# Patient Record
Sex: FEMALE | Race: WHITE | NOT HISPANIC OR LATINO | Employment: FULL TIME | URBAN - METROPOLITAN AREA
[De-identification: names, ages, dates, MRNs, and addresses within clinical notes are randomized per-mention and may not be internally consistent; named-entity substitution may affect disease eponyms.]

---

## 2016-02-22 LAB — PAP-ABSTRACT: NORMAL

## 2017-11-10 ENCOUNTER — TRANSFERRED RECORDS (OUTPATIENT)
Dept: HEALTH INFORMATION MANAGEMENT | Facility: CLINIC | Age: 32
End: 2017-11-10

## 2018-06-13 ENCOUNTER — PRENATAL OFFICE VISIT (OUTPATIENT)
Dept: NURSING | Facility: CLINIC | Age: 33
End: 2018-06-13
Payer: COMMERCIAL

## 2018-06-13 VITALS
WEIGHT: 168.6 LBS | SYSTOLIC BLOOD PRESSURE: 103 MMHG | HEIGHT: 65 IN | DIASTOLIC BLOOD PRESSURE: 58 MMHG | HEART RATE: 84 BPM | BODY MASS INDEX: 28.09 KG/M2 | TEMPERATURE: 99.1 F

## 2018-06-13 DIAGNOSIS — Z34.90 SUPERVISION OF NORMAL PREGNANCY: Primary | ICD-10-CM

## 2018-06-13 PROBLEM — Z23 NEED FOR TDAP VACCINATION: Status: ACTIVE | Noted: 2018-06-13

## 2018-06-13 LAB
ABO + RH BLD: NORMAL
ABO + RH BLD: NORMAL
ALBUMIN UR-MCNC: NEGATIVE MG/DL
APPEARANCE UR: CLEAR
B-HCG SERPL-ACNC: ABNORMAL IU/L (ref 0–5)
BETA HCG QUAL IFA URINE: POSITIVE
BILIRUB UR QL STRIP: NEGATIVE
BLD GP AB SCN SERPL QL: NORMAL
BLOOD BANK CMNT PATIENT-IMP: NORMAL
COLOR UR AUTO: YELLOW
ERYTHROCYTE [DISTWIDTH] IN BLOOD BY AUTOMATED COUNT: 13.4 % (ref 10–15)
GLUCOSE UR STRIP-MCNC: NEGATIVE MG/DL
HBV SURFACE AG SERPL QL IA: NONREACTIVE
HCT VFR BLD AUTO: 38.5 % (ref 35–47)
HGB BLD-MCNC: 13.2 G/DL (ref 11.7–15.7)
HGB UR QL STRIP: NEGATIVE
HIV 1+2 AB+HIV1 P24 AG SERPL QL IA: NONREACTIVE
KETONES UR STRIP-MCNC: NEGATIVE MG/DL
LEUKOCYTE ESTERASE UR QL STRIP: NEGATIVE
MCH RBC QN AUTO: 31 PG (ref 26.5–33)
MCHC RBC AUTO-ENTMCNC: 34.3 G/DL (ref 31.5–36.5)
MCV RBC AUTO: 90 FL (ref 78–100)
NITRATE UR QL: NEGATIVE
PH UR STRIP: 5 PH (ref 5–7)
PLATELET # BLD AUTO: 246 10E9/L (ref 150–450)
RBC # BLD AUTO: 4.26 10E12/L (ref 3.8–5.2)
SOURCE: NORMAL
SP GR UR STRIP: >1.03 (ref 1–1.03)
SPECIMEN EXP DATE BLD: NORMAL
TSH SERPL DL<=0.005 MIU/L-ACNC: 1.88 MU/L (ref 0.4–4)
UROBILINOGEN UR STRIP-ACNC: 0.2 EU/DL (ref 0.2–1)
WBC # BLD AUTO: 7.1 10E9/L (ref 4–11)

## 2018-06-13 PROCEDURE — 86850 RBC ANTIBODY SCREEN: CPT | Performed by: OBSTETRICS & GYNECOLOGY

## 2018-06-13 PROCEDURE — 84443 ASSAY THYROID STIM HORMONE: CPT | Performed by: OBSTETRICS & GYNECOLOGY

## 2018-06-13 PROCEDURE — 81003 URINALYSIS AUTO W/O SCOPE: CPT | Performed by: OBSTETRICS & GYNECOLOGY

## 2018-06-13 PROCEDURE — 87340 HEPATITIS B SURFACE AG IA: CPT | Performed by: OBSTETRICS & GYNECOLOGY

## 2018-06-13 PROCEDURE — 86762 RUBELLA ANTIBODY: CPT | Performed by: OBSTETRICS & GYNECOLOGY

## 2018-06-13 PROCEDURE — 86900 BLOOD TYPING SEROLOGIC ABO: CPT | Performed by: OBSTETRICS & GYNECOLOGY

## 2018-06-13 PROCEDURE — 86780 TREPONEMA PALLIDUM: CPT | Performed by: OBSTETRICS & GYNECOLOGY

## 2018-06-13 PROCEDURE — 85027 COMPLETE CBC AUTOMATED: CPT | Performed by: OBSTETRICS & GYNECOLOGY

## 2018-06-13 PROCEDURE — 84703 CHORIONIC GONADOTROPIN ASSAY: CPT | Performed by: OBSTETRICS & GYNECOLOGY

## 2018-06-13 PROCEDURE — 86901 BLOOD TYPING SEROLOGIC RH(D): CPT | Performed by: OBSTETRICS & GYNECOLOGY

## 2018-06-13 PROCEDURE — 99207 ZZC NO CHARGE NURSE ONLY: CPT

## 2018-06-13 PROCEDURE — 87086 URINE CULTURE/COLONY COUNT: CPT | Performed by: OBSTETRICS & GYNECOLOGY

## 2018-06-13 PROCEDURE — 36415 COLL VENOUS BLD VENIPUNCTURE: CPT | Performed by: OBSTETRICS & GYNECOLOGY

## 2018-06-13 PROCEDURE — 84702 CHORIONIC GONADOTROPIN TEST: CPT | Performed by: OBSTETRICS & GYNECOLOGY

## 2018-06-13 PROCEDURE — 87389 HIV-1 AG W/HIV-1&-2 AB AG IA: CPT | Performed by: OBSTETRICS & GYNECOLOGY

## 2018-06-13 NOTE — MR AVS SNAPSHOT
After Visit Summary   6/13/2018    Awilda Campos    MRN: [id number]           Patient Information     Date Of Birth          1985        Visit Information        Provider Department      6/13/2018 10:00 AM RD OB NURSE EDUCATION [facility]        Today's Diagnoses     Supervision of normal pregnancy    -  1       Follow-ups after your visit        Your next 10 appointments already scheduled     Jun 22, 2018 10:20 AM CDT   US OB < 14 WEEKS SINGLE with RDUS1   [facility] ([facility])    [address] [place] [id number] [phone]           Please bring a list of your medicines (including vitamins, minerals and over-the-counter drugs). Also, tell your doctor about any allergies you may have. Wear comfortable clothes and leave your valuables at home.  If you re less than 20 weeks drink four 8-ounce glasses of fluid an hour before your exam. If you need to empty your bladder before your exam, try to release only a little urine. Then, drink another glass of fluid.  You may have up to two family members in the exam room. If you bring a small child, an adult must be there to care for him or her.  Please call the Imaging Department at your exam site with any questions.            Jun 22, 2018 11:00 AM CDT   SHORT with SHILO Parmar CNM   [facility] ([facility])    [address] [place] [id number] [phone]            Jul 06, 2018  4:00 PM CDT   New Prenatal with Kim Giraldo MD   [facility] ([facility])    [address] [place] [id number] [phone]              Future tests that were ordered for you today     Open Future Orders        Priority Expected Expires Ordered    US OB < 14 Weeks Single Routine  6/13/2019 6/13/2018            Who to contact     If you have questions or need follow  "up information about today's clinic visit or your schedule please contact [facility] directly at [phone].  Normal or non-critical lab and imaging results will be communicated to you by MyChart, letter or phone within 4 business days after the clinic has received the results. If you do not hear from us within 7 days, please contact the clinic through [employer]ree[employer] or phone. If you have a critical or abnormal lab result, we will notify you by phone as soon as possible.  Submit refill requests through [employer] or call your pharmacy and they will forward the refill request to us. Please allow 3 business days for your refill to be completed.          Additional Information About Your Visit        Babytreehart Information     [employer] lets you send messages to your doctor, view your test results, renew your prescriptions, schedule appointments and more. To sign up, go to [email or web address] . Click on \"Log in\" on the left side of the screen, which will take you to the Welcome page. Then click on \"Sign up Now\" on the right side of the page.     You will be asked to enter the access code listed below, as well as some personal information. Please follow the directions to create your username and password.     Your access code is: [email or web address]  Expires: 2018  9:28 AM     Your access code will  in 90 days. If you need help or a new code, please call your [place] clinic or [phone].        Care EveryWhere ID     This is your Care EveryWhere ID. This could be used by other organizations to access your [facility] medical records  [id number]        Your Vitals Were     Pulse Temperature Height Last Period BMI (Body Mass Index)       84 99.1  F (37.3  C) 5' 5\" (1.651 m) 04/10/2018 28.06 kg/m2        Blood Pressure from Last 3 Encounters:   18 103/58    Weight from Last 3 Encounters:   18 168 lb 9.6 oz (76.5 kg)              We Performed the Following     ABO/RH Type and Screen     Beta HCG " Qual, Urine - [facility] and [facility] ([id number])     CBC with Platelets     HCG Quantitative Pregnancy, Blood ([id number])     Hepatitis B surface antigen     HIV Antigen Antibody Combo     Rubella Antibody IgG Quantitative     Treponema Abs w Reflex to RPR and Titer     TSH with free T4 reflex     UA without Microscopic     Urine Culture Aerobic Bacterial        Primary Care Provider    None Specified       No primary provider on file.        Equal Access to Services     [facility]: Hadii aad ku hadasho Soomaali, waaxda luqadaha, qaybta kaalmada adejameyada, nathaniel cleary hayjulieth adejame rodriguezmannybhavik martinez . So [facility] [phone].    ATENCIÓN: Si habla español, tiene a dobson disposición servicios gratuitos de asistencia lingüística. Llame al [phone].    We comply with applicable federal civil rights laws and [place] laws. We do not discriminate on the basis of race, color, national origin, age, disability, sex, sexual orientation, or gender identity.            Thank you!     Thank you for choosing [facility]  for your care. Our goal is always to provide you with excellent care. Hearing back from our patients is one way we can continue to improve our services. Please take a few minutes to complete the written survey that you may receive in the mail after your visit with us. Thank you!             Your Updated Medication List - Protect others around you: Learn how to safely use, store and throw away your medicines at www.disposemymeds.org.          This list is accurate as of 6/13/18 10:39 AM.  Always use your most recent med list.                   Brand Name Dispense Instructions for use Diagnosis    * LEVOTHYROXINE SODIUM PO      Take 88 mcg by mouth        * LEVOTHYROXINE SODIUM PO      Take 5 mcg by mouth        PRENATAL + DHA PO           * Notice:  This list has 2 medication(s) that are the same as other medications prescribed for you. Read the directions carefully, and ask your doctor or other care  provider to review them with you.

## 2018-06-13 NOTE — PROGRESS NOTES
Patient presents for new ob teaching and labs, third pregnancy. Recent SAB 4/10/18 ( 5 weeks), negative pregnancy test 3 weeks after SAB. Patient did not have a period in 5/2018, positive pregnancy test. 5/30/18. Patient recently moved here from [place] with her  and toddler. Ultrasound scheduled for 6/22/18 with CNM appointment after to review results. Declined first trimester screening. Handouts reviewed and given. Recommended B6 and Unisom for nausea. Has NOB appointment with Dr Giraldo 7/06/18. Patient was diagnosis with Hashimoto's disease during postpartum. TSH drawn today with NOB labs, plus quant.     Caffeine intake/servings daily - ; 2 tea  Calcium intake/servings daily - 3  Exercise 7 times weekly - describe ; walks, elliptical, active with job(Horticulturist), precautions given   Sunscreen used - Yes  Seatbelts used - Yes  Guns stored in the home - No  Self Breast Exam - Yes  Pap test up to date -  Yes  Eye exam up to date -  Yes  Dental exam up to date -  Yes  DEXA scan up to date -  No  Flex Sig/Colonoscopy up to date -  No  Mammography up to date -  No  Immunizations reviewed and up to date - Yes  Abuse: Current or Past (Physical, Sexual or Emotional) - No  Do you feel safe in your environment - Yes  Do you cope well with stress - Yes  Do you suffer from insomnia - No    Prenatal OB Questionnaire  Past Medical History  Diabetes   No  Hypertension   No  Heart Disease, mitral valve prolapse, or rheumatic fever?   No  An autoimmune disorder such as Lupus or Rheumatoid Arthritis?   No  Kidney Disease or Urinary Tract Infection?   No  Epilepsy, seizures or spells?   No  Migraine headaches?   No  A stroke or loss of function or sensation?   No  Any other neurological problems?   No  Have you ever been treated for depression?  No  Are you having problems with crying spells or loss of self-esteem?   No  Have you ever required psychiatric care?   No  Have you ever hepatitis, liver disease or jaundice?    No  Have you ever been treated for blood clots in your veins, deep venous thrombosis, inflammation in the veins, thrombosis, phlebitis, pulmonary embolism or varicosities?   No  Have you had excessive bleeding after surgery or dental work?   No  Do you bleed more than other women after a cut or scratch?   No  Do you have a history of anemia?   No  Have you ever been treated for thyroid problems or taken thyroid medication?  Takes Levothyroxine   Do you have any other endocrine problems?  No  Have you ever been in a major accident or suffered serious trauma?   No  Within the last year, has anyone hit slapped, kicked or otherwise hurt you?  No  In the last year, has anyone forced you to have sex when you didn't want to?  No  Have you ever had a blood transfusion?   No  Would you refuse a blood transfusion if a doctor judged it to be medically necessary?   No  If you answered yes, would you rather die than have a blood transfusion?   No  If you answered yes, is this for Druze reasons?   No  Does anyone in your home smoke?   No  Do you use tobacco products?  No  Do you drink beer, wine, hard liquor?  No  Do you use any of the following: marijuana, speed, cocaine, heroine, hallucinogens, or other drugs?  No  Is your blood type Rh negative?   No  Have you ever had abnormal antibodies in your blood?   No  Have you ever had asthma?   Yes  Have you ever had tuberculosis?   No  Do you have any allergies to drugs or over-the-counter medications?   No    Allergies as of 6/13/2018:    Allergies as of 06/13/2018     (No Known Allergies)       Do you have any breast problems?   No  Have you ever ?   No  Have you had any gynecological surgical procedures such as cervical conization, a LEEP procedure, laser treatment, cryosurgery of the cervix, or a dilation and curettage, etc?  No  Have you had any other surgical procedures?  No  Have you been hospitalized for a nonsurgical reason excluding normal delivery?   No  Have  you ever had any anesthetic complications?   No  Have you ever had an abnormal pap smear?   No  Do you have a history of abnormalities of the uterus?   No  Did it take you more than one year to become pregnant?   No  Have you ever been evaluated or treated for infertility?   No  Is there a history of medical problems in your family, which you feel might adversely affect your health or pregnancy?   No  Do you have any other problems we have not asked you about which you feel may be important to this pregnancy?  No    Symptoms since Last Menstrual Period  Do you have any of the following:    *abdominal pain  No  *blood in stool or urine  No  *chest pain  No  *shortness of breath  No  *coughing or vomiting up blood No  *heart racing or skipping beats  No  *nausea and vomiting  Yes  *pain with urination  No  *vaginal discharge or bleeding  No  Current medications are:  Current Outpatient Prescriptions   Medication Sig Dispense Refill     LEVOTHYROXINE SODIUM PO Take 88 mcg by mouth       LEVOTHYROXINE SODIUM PO Take 5 mcg by mouth       Prenatal-FeFum-FA-DHA w/o A (PRENATAL + DHA PO)          Genetic Screening  At the time of birth, will you be 35 years old or older?  No  Has the patient, baby s father, or anyone in either family had:  Thalassemia (Italian, Greek, Mediterranean, or  background only) and an MCV result less than 80?  No  Neural tube defect such as meningomyelocele, spina bifida or anencephaly?  No  Congenital heart defect?  No  Down s syndrome?  No  Marck-Sach s disease (Yarsanism, Cajun, Luxembourgish-Sherrill)?  No  Sickle cell disease or trait (Grisel)?  No  Hemophilia or other inherited problems of blood coagulation? No  Muscular dystrophy?  No  Cystic Fibrosis?  No  Hal s chorea?  No  Mental retardation/autism? No   If yes, was the person tested for fragile X?  No  Any other inherited genetic or chromosomal disorder?  No  Maternal metabolic disorder (e.g. insulin-dependent diabetes, PKU)? No  A child  with birth defects not listed above?  No  Recurrent pregnancy loss or a stillbirth?  No  Has the patient had any medications/street drugs/alcohol since her last menstrual period? No  Does the patient or baby s father have any other genetic risks?  No  Infection History  Do you object to being tested for Hepatitis B? No  Do you object to being tested for HIV? No  Do you feel that you are at high risk for coming in contact with the AIDS virus?  No  Have you ever been treated for tuberculosis?  No  Have you ever received the BCG vaccine for tuberculosis?  No  Have you ever had a positive skin test for tuberculosis? No  Do you live with someone who has tuberculosis?  No  Have you ever been exposed to tuberculosis?  No  Do you have genital herpes?  No  Does your partner have genital herpes?  No  Have you had a rash or viral illness since your last period?  No  Have you ever had Gonorrhea, Chlamydia, Syphilis, venereal warts, trichomoniasis, pelvic inflammatory disease or any other sexually transmitted disease?  No  Do you know if you are a genital group B streptococcus carrier? No  You have not had chicken pox/varicella  Yes  Have you been vaccinated against chicken pox?  No  Have you had any other infectious disease? No        Early ultrasound screening tool:    Does patient have irregular periods?  No  Did patient use hormonal birth control in the three months prior to positive urine pregnancy test? No  Is the patient breastfeeding?  No  Is the patient 10 weeks or greater at time of education visit?  No

## 2018-06-14 LAB
BACTERIA SPEC CULT: NORMAL
RUBV IGG SERPL IA-ACNC: 17 IU/ML
SPECIMEN SOURCE: NORMAL
T PALLIDUM AB SER QL: NONREACTIVE

## 2018-06-22 ENCOUNTER — OFFICE VISIT (OUTPATIENT)
Dept: MIDWIFE SERVICES | Facility: CLINIC | Age: 33
End: 2018-06-22
Payer: COMMERCIAL

## 2018-06-22 ENCOUNTER — RADIANT APPOINTMENT (OUTPATIENT)
Dept: ULTRASOUND IMAGING | Facility: CLINIC | Age: 33
End: 2018-06-22
Attending: OBSTETRICS & GYNECOLOGY
Payer: COMMERCIAL

## 2018-06-22 VITALS
BODY MASS INDEX: 27.96 KG/M2 | OXYGEN SATURATION: 96 % | SYSTOLIC BLOOD PRESSURE: 115 MMHG | DIASTOLIC BLOOD PRESSURE: 70 MMHG | HEART RATE: 91 BPM | WEIGHT: 168 LBS

## 2018-06-22 DIAGNOSIS — Z34.90 SUPERVISION OF NORMAL PREGNANCY: ICD-10-CM

## 2018-06-22 DIAGNOSIS — Z34.81 ENCOUNTER FOR SUPERVISION OF OTHER NORMAL PREGNANCY IN FIRST TRIMESTER: Primary | ICD-10-CM

## 2018-06-22 PROBLEM — Z34.80 ENCOUNTER FOR SUPERVISION OF OTHER NORMAL PREGNANCY, UNSPECIFIED TRIMESTER: Status: ACTIVE | Noted: 2018-06-22

## 2018-06-22 PROCEDURE — 99202 OFFICE O/P NEW SF 15 MIN: CPT | Performed by: ADVANCED PRACTICE MIDWIFE

## 2018-06-22 PROCEDURE — 76815 OB US LIMITED FETUS(S): CPT | Performed by: OBSTETRICS & GYNECOLOGY

## 2018-06-22 NOTE — MR AVS SNAPSHOT
"              After Visit Summary   6/22/2018    Awilda Campos    MRN: [id number]           Patient Information     Date Of Birth          1985        Visit Information        Provider Department      6/22/2018 11:00 AM Shana Noble APRN CNM [facility]        Today's Diagnoses     Encounter for supervision of other normal pregnancy in first trimester    -  1       Follow-ups after your visit        Your next 10 appointments already scheduled     Jul 06, 2018  4:00 PM CDT   New Prenatal with Kim Giraldo MD   [facility] ([facility]    [address] [place] [id number] [phone]              Who to contact     If you have questions or need follow up information about today's clinic visit or your schedule please contact [facility] directly at [phone].  Normal or non-critical lab and imaging results will be communicated to you by MyChart, letter or phone within 4 business days after the clinic has received the results. If you do not hear from us within 7 days, please contact the clinic through MyChart or phone. If you have a critical or abnormal lab result, we will notify you by phone as soon as possible.  Submit refill requests through [employer] or call your pharmacy and they will forward the refill request to us. Please allow 3 business days for your refill to be completed.          Additional Information About Your Visit        MyChart Information     [employer] lets you send messages to your doctor, view your test results, renew your prescriptions, schedule appointments and more. To sign up, go to [email or web address] . Click on \"Log in\" on the left side of the screen, which will take you to the Welcome page. Then click on \"Sign up Now\" on the right side of the page.     You will be asked to enter the access code listed below, as well as some personal information. Please follow the directions to " create your username and password.     Your access code is: [email or web address]  Expires: 2018  9:28 AM     Your access code will  in 90 days. If you need help or a new code, please call your [place] clinic or [phone].        Care EveryWhere ID     This is your Care EveryWhere ID. This could be used by other organizations to access your [place] medical records  [id number]        Your Vitals Were     Pulse Last Period Pulse Oximetry BMI (Body Mass Index)          91 04/10/2018 96% 27.96 kg/m2         Blood Pressure from Last 3 Encounters:   18 115/70   18 103/58    Weight from Last 3 Encounters:   18 168 lb (76.2 kg)   18 168 lb 9.6 oz (76.5 kg)              Today, you had the following     No orders found for display       Primary Care Provider Fax #    Physician No Ref-Primary [phone]       No address on file        Equal Access to Services     [facility] : Hadii aad ku hadasho Soomaali, waaxda luqadaha, qaybta kaalmada adeegyada, waxay owenin hayjulieth martinez . So [facility] [phone].    ATENCIÓN: Si habla español, tiene a dobson disposición servicios gratuitos de asistencia lingüística. Llame al [phone].    We comply with applicable federal civil rights laws and [place] laws. We do not discriminate on the basis of race, color, national origin, age, disability, sex, sexual orientation, or gender identity.            Thank you!     Thank you for choosing [facility]  for your care. Our goal is always to provide you with excellent care. Hearing back from our patients is one way we can continue to improve our services. Please take a few minutes to complete the written survey that you may receive in the mail after your visit with us. Thank you!             Your Updated Medication List - Protect others around you: Learn how to safely use, store and throw away your medicines at www.disposemymeds.org.          This list is accurate as of 18  1:23  PM.  Always use your most recent med list.                   Brand Name Dispense Instructions for use Diagnosis    * LEVOTHYROXINE SODIUM PO      Take 88 mcg by mouth        * LEVOTHYROXINE SODIUM PO      Take 5 mcg by mouth        PRENATAL + DHA PO           * Notice:  This list has 2 medication(s) that are the same as other medications prescribed for you. Read the directions carefully, and ask your doctor or other care provider to review them with you.

## 2018-06-22 NOTE — PROGRESS NOTES
Patient is in clinic today for confirmation US. She did not have a period after her miscarriage so there is no LMP. Us today shows single IUP at 10w3d with LEONARDO 1/12/19. Patient has new OB appointment with Dr. Giraldo on 7/6/18. She reports feeling nauseous, but is not vomiting. She does not tolerate B6 tablets, recommendations given for Unisom.  Zhane Zamora  Student Nurse Midwife

## 2018-06-22 NOTE — PROGRESS NOTES
Awilda Campos presents to the clinic for an early ultrasound to date her pregnancy r/t conceived shortly after SAB without period in between. She is feeling well. Has nausea and fatigue but it is slowly improving. Denies vomiting. Happy with good ultrasound results today. Has first OB appointment scheduled in early July with Dr Giraldo.     O: /70  Pulse 91  Wt 168 lb (76.2 kg)  LMP 04/10/2018  SpO2 96%  BMI 27.96 kg/m2  Mentation: normal and bright.    No exam today    A: Graham IUP at 10wks 6d with correlated EDC of 1/12/18    Plan: plan to proceed with new OB appointment with Dr Giraldo. Call clinic with any concerns.    Shana Noble CNM

## 2018-07-06 ENCOUNTER — PRENATAL OFFICE VISIT (OUTPATIENT)
Dept: OBGYN | Facility: CLINIC | Age: 33
End: 2018-07-06
Payer: COMMERCIAL

## 2018-07-06 VITALS
TEMPERATURE: 98.8 F | HEIGHT: 65 IN | HEART RATE: 83 BPM | BODY MASS INDEX: 28.32 KG/M2 | OXYGEN SATURATION: 99 % | WEIGHT: 170 LBS | DIASTOLIC BLOOD PRESSURE: 69 MMHG | SYSTOLIC BLOOD PRESSURE: 108 MMHG

## 2018-07-06 DIAGNOSIS — Z34.80 ENCOUNTER FOR SUPERVISION OF OTHER NORMAL PREGNANCY, UNSPECIFIED TRIMESTER: Primary | ICD-10-CM

## 2018-07-06 DIAGNOSIS — J45.20 MILD INTERMITTENT ASTHMA WITHOUT COMPLICATION: ICD-10-CM

## 2018-07-06 PROCEDURE — 99207 ZZC FIRST OB VISIT: CPT | Performed by: OBSTETRICS & GYNECOLOGY

## 2018-07-06 RX ORDER — ALBUTEROL SULFATE 90 UG/1
2 AEROSOL, METERED RESPIRATORY (INHALATION) EVERY 6 HOURS PRN
Qty: 1 INHALER | Refills: 1 | Status: SHIPPED | OUTPATIENT
Start: 2018-07-06 | End: 2019-11-12

## 2018-07-06 NOTE — PROGRESS NOTES
CC: New Ob visit  HPI: Awilda Campos is a 33 year old  here for new Ob visit.  Patient's last menstrual period was 04/10/2018..  She is 12w6d by known LMP and c/w 10wk us, giving her an EDC of 19.  The pregnancy was planned and she and her  are feeling excited.    This far the pregnancy has been uneventful other than mild nausea.  Her previous pregnancy was uncomplicated, she had a VE of 9lb baby.  Some persistent pain from the laceration, 2nd degree from her description.    Please abstract the following data from this visit with this patient into the appropriate field in Epic:    Pap smear done on this date: 16 (approximately), by this group: [facility] [place], results were nil.       Past Medical History:   Diagnosis Date     Hashimoto's disease        Past Surgical History:   Procedure Laterality Date     NO HISTORY OF SURGERY       Obstetric History       T1      L1     SAB1   TAB0   Ectopic0   Multiple0   Live Births1       # Outcome Date GA Lbr Ren/2nd Weight Sex Delivery Anes PTL Lv   3 Current            2 SAB 04/10/18 5w0d          1 Term 12/06/15 41w0d  9 lb (4.082 kg) M  EPI N CHEPE              Current Outpatient Prescriptions:      albuterol (PROAIR HFA/PROVENTIL HFA/VENTOLIN HFA) 108 (90 Base) MCG/ACT Inhaler, Inhale 2 puffs into the lungs every 6 hours as needed for shortness of breath / dyspnea or wheezing, Disp: 1 Inhaler, Rfl: 1     ALBUTEROL IN, , Disp: , Rfl:      Cholecalciferol (VITAMIN D-3 PO), , Disp: , Rfl:      LEVOTHYROXINE SODIUM PO, Take 88 mcg by mouth, Disp: , Rfl:      LIOTHYRONINE SODIUM PO, Take 5 mcg by mouth, Disp: , Rfl:      Prenatal-FeFum-FA-DHA w/o A (PRENATAL + DHA PO), , Disp: , Rfl:      [DISCONTINUED] LEVOTHYROXINE SODIUM PO, Take 5 mcg by mouth, Disp: , Rfl:     No Known Allergies    Family History   Problem Relation Age of Onset     Breast Cancer Mother      Hyperlipidemia Mother      Asthma Brother      Arthritis Paternal  "Grandmother        Past medical, social, surgical and family history were reviewed and updated in EPIC.    ROS: No TIA's or unusual headaches, no dysphagia.  No prolonged cough. No dyspnea or chest pain on exertion.  No abdominal pain, change in bowel habits, black or bloody stools.  No urinary tract symptoms.  No new or unusual musculoskeletal symptoms.  Normal menses, no abnormal vaginal bleeding, discharge or unexpected pelvic pain. No new breast lumps, breast pain or nipple discharge.    PE: /69 (BP Location: Left arm, Patient Position: Sitting, Cuff Size: Adult Regular)  Pulse 83  Temp 98.8  F (37.1  C) (Oral)  Ht 5' 5\" (1.651 m)  Wt 170 lb (77.1 kg)  LMP 04/10/2018  SpO2 99%  BMI 28.29 kg/m2    Gen: Healthy appearing female in no acute distress  Heart: RRR  Lungs: CTAB  Abd: +BS, SNT  Ex: No C/C/E, no suspicious rashes or lesions    Pelvic: Normal vulva and vagina with scant white d/c.  Cervix without lesions, no CMT.  Uterus approximately 12 week size, mobile, NT.  Adnexa NT, no masses.    A/P:  1) IUP at 12w6d         PNL wnl, pap UTD        Reviewed anticipated course of prenatal care        Reviewed recommendations for weight gain, activity and diet        We discussed options for genetic screening and diagnosis including CVS/amnio, first trimester screen and quad screen.  We discussed a fetal survey will be performed around 18-20 weeks. They are considering the quad screen next visit. Survey ordered        Discussed MD call schedule as well as role of residents and med students both in clinic and hospital.  She is ok with resident care              RTC 4 weeks    Kim Giraldo MD                 "

## 2018-07-06 NOTE — Clinical Note
Mammogram done on this date: 11/10/2017 (approximately), by this group: [facility], results were normal.  Pap smear done on this date: 2/22/2016 (approximately), by this group: [facility], results were NIL.

## 2018-07-06 NOTE — Clinical Note
Please abstract the following data from this visit with this patient into the appropriate field in Epic:  Pap smear done on this date: 2/22/16 (approximately), by this group: [facility] in [place], results were nil.

## 2018-07-06 NOTE — PROGRESS NOTES
"Chief Complaint   Patient presents with     Prenatal Care     12+6.       Initial /69 (BP Location: Left arm, Patient Position: Sitting, Cuff Size: Adult Regular)  Pulse 83  Temp 98.8  F (37.1  C) (Oral)  Ht 5' 5\" (1.651 m)  Wt 170 lb (77.1 kg)  LMP 04/10/2018  SpO2 99%  BMI 28.29 kg/m2 Estimated body mass index is 28.29 kg/(m^2) as calculated from the following:    Height as of this encounter: 5' 5\" (1.651 m).    Weight as of this encounter: 170 lb (77.1 kg).  BP completed using cuff size: regular        The following HM Due: NONE      The following patient reported/Care Every where data was sent to:  P ABSTRACT QUALITY INITIATIVES [16286]  Mammogram done on this date: 11/10/2017 (approximately), by this group: [facility], results were normal.   Pap smear done on this date: 2016 (approximately), by this group: [facility], results were NIL.       n/a and patient has appointment for today                         "

## 2018-07-06 NOTE — MR AVS SNAPSHOT
After Visit Summary   7/6/2018    Awilda Campos    MRN: [id number]           Patient Information     Date Of Birth          1985        Visit Information        Provider Department      7/6/2018 4:00 PM Kim Giraldo MD [facility]        Today's Diagnoses     Encounter for supervision of other normal pregnancy, unspecified trimester    -  1    Mild intermittent asthma without complication           Follow-ups after your visit        Your next 10 appointments already scheduled     Jul 30, 2018  4:30 PM CDT   ESTABLISHED PRENATAL with Casandra Sanchez MD   [facility] ([facility])    [address] [place] [id number] [phone]            Aug 22, 2018  4:00 PM CDT   US OB > 14 WEEKS COMPLETE SINGLE with RDUS1   [facility] ([facility])    [address] [place] [id number] [phone]           Please bring a list of your medicines (including vitamins, minerals and over-the-counter drugs). Also, tell your doctor about any allergies you may have. Wear comfortable clothes and leave your valuables at home.  If you re less than 20 weeks drink four 8-ounce glasses of fluid an hour before your exam. If you need to empty your bladder before your exam, try to release only a little urine. Then, drink another glass of fluid.  You may have up to two family members in the exam room. If you bring a small child, an adult must be there to care for him or her.  Please call the Imaging Department at your exam site with any questions.            Aug 22, 2018  4:45 PM CDT   ESTABLISHED PRENATAL with Kim Giraldo MD   [facility] ([facility])    [address] [place] [id number] [phone]              Future tests that were ordered for you today     Open Future Orders        Priority Expected Expires Ordered    US OB > 14  "Weeks Complete Single Routine  2019            Who to contact     If you have questions or need follow up information about today's clinic visit or your schedule please contact [facility] directly at [phone].  Normal or non-critical lab and imaging results will be communicated to you by Sqordhart, letter or phone within 4 business days after the clinic has received the results. If you do not hear from us within 7 days, please contact the clinic through Sqordhart or phone. If you have a critical or abnormal lab result, we will notify you by phone as soon as possible.  Submit refill requests through [employer] or call your pharmacy and they will forward the refill request to us. Please allow 3 business days for your refill to be completed.          Additional Information About Your Visit        [employer] Information     [employer] lets you send messages to your doctor, view your test results, renew your prescriptions, schedule appointments and more. To sign up, go to [email or web address] . Click on \"Log in\" on the left side of the screen, which will take you to the Welcome page. Then click on \"Sign up Now\" on the right side of the page.     You will be asked to enter the access code listed below, as well as some personal information. Please follow the directions to create your username and password.     Your access code is: [email or web address]  Expires: 2018  9:28 AM     Your access code will  in 90 days. If you need help or a new code, please call your [facility] or [phone].        Care EveryWhere ID     This is your Care EveryWhere ID. This could be used by other organizations to access your [place] medical records  [id number]        Your Vitals Were     Pulse Temperature Height Last Period Pulse Oximetry BMI (Body Mass Index)    83 98.8  F (37.1  C) (Oral) 5' 5\" (1.651 m) 04/10/2018 99% 28.29 kg/m2       Blood Pressure from Last 3 Encounters:   18 108/69   18 " 115/70   06/13/18 103/58    Weight from Last 3 Encounters:   07/06/18 170 lb (77.1 kg)   06/22/18 168 lb (76.2 kg)   06/13/18 168 lb 9.6 oz (76.5 kg)                 Today's Medication Changes          These changes are accurate as of 7/6/18  4:56 PM.  If you have any questions, ask your nurse or doctor.               Start taking these medicines.        Dose/Directions    albuterol 108 (90 Base) MCG/ACT Inhaler   Commonly known as:  PROAIR HFA/PROVENTIL HFA/VENTOLIN HFA   Used for:  Mild intermittent asthma without complication   Started by:  Kim Giraldo MD        Dose:  2 puff   Inhale 2 puffs into the lungs every 6 hours as needed for shortness of breath / dyspnea or wheezing   Quantity:  1 Inhaler   Refills:  1         These medicines have changed or have updated prescriptions.        Dose/Directions    LEVOTHYROXINE SODIUM PO   Indication:  Underactive Thyroid   This may have changed:  Another medication with the same name was removed. Continue taking this medication, and follow the directions you see here.   Changed by:  Kim Giraldo MD        Dose:  88 mcg   Take 88 mcg by mouth   Refills:  0            Where to get your medicines      These medications were sent to [facility] [address] [place] [address] AT [address] [address]    Hours:  24-hours Phone:  [phone]     albuterol 108 (90 Base) MCG/ACT Inhaler                Primary Care Provider Fax #    Physician No Ref-Primary [phone]       No address on file        Equal Access to Services     MIRTHA JACKSON AH: Hadii aad ku hadasho Sojefersonali, waaxda luqadaha, qaybta kaalmada adeegyada, nathaniel guardado. So [facility] [phone].    ATENCIÓN: Si habla español, tiene a dobson disposición servicios gratuitos de asistencia lingüística. Llame al [phone].    We comply with applicable federal civil rights laws and [place] laws. We do not discriminate on the basis of  race, color, national origin, age, disability, sex, sexual orientation, or gender identity.            Thank you!     Thank you for choosing [facility]  for your care. Our goal is always to provide you with excellent care. Hearing back from our patients is one way we can continue to improve our services. Please take a few minutes to complete the written survey that you may receive in the mail after your visit with us. Thank you!             Your Updated Medication List - Protect others around you: Learn how to safely use, store and throw away your medicines at www.disposemymeds.org.          This list is accurate as of 7/6/18  4:56 PM.  Always use your most recent med list.                   Brand Name Dispense Instructions for use Diagnosis    albuterol 108 (90 Base) MCG/ACT Inhaler    PROAIR HFA/PROVENTIL HFA/VENTOLIN HFA    1 Inhaler    Inhale 2 puffs into the lungs every 6 hours as needed for shortness of breath / dyspnea or wheezing    Mild intermittent asthma without complication       ALBUTEROL IN           LEVOTHYROXINE SODIUM PO      Take 88 mcg by mouth        LIOTHYRONINE SODIUM PO      Take 5 mcg by mouth        PRENATAL + DHA PO           VITAMIN D-3 PO

## 2018-07-30 ENCOUNTER — PRENATAL OFFICE VISIT (OUTPATIENT)
Dept: OBGYN | Facility: CLINIC | Age: 33
End: 2018-07-30
Payer: COMMERCIAL

## 2018-07-30 VITALS
DIASTOLIC BLOOD PRESSURE: 62 MMHG | BODY MASS INDEX: 28.96 KG/M2 | OXYGEN SATURATION: 97 % | HEART RATE: 84 BPM | SYSTOLIC BLOOD PRESSURE: 107 MMHG | WEIGHT: 174 LBS

## 2018-07-30 DIAGNOSIS — Z34.80 ENCOUNTER FOR SUPERVISION OF OTHER NORMAL PREGNANCY, UNSPECIFIED TRIMESTER: ICD-10-CM

## 2018-07-30 PROCEDURE — 99207 ZZC PRENATAL VISIT: CPT | Performed by: OBSTETRICS & GYNECOLOGY

## 2018-07-30 PROCEDURE — 36415 COLL VENOUS BLD VENIPUNCTURE: CPT | Performed by: OBSTETRICS & GYNECOLOGY

## 2018-07-30 PROCEDURE — 99000 SPECIMEN HANDLING OFFICE-LAB: CPT | Performed by: OBSTETRICS & GYNECOLOGY

## 2018-07-30 PROCEDURE — 81511 FTL CGEN ABNOR FOUR ANAL: CPT | Mod: 90 | Performed by: OBSTETRICS & GYNECOLOGY

## 2018-07-30 NOTE — PROGRESS NOTES
Has survey ultrasound scheduled.  Wants quad screen today.  Had normal TSH at first OB visit, (known Hashimoto, on medication)--will see FP to assess next month when she returns for OB visit.  RTC 4 weeks.  AM

## 2018-07-30 NOTE — LETTER
[address] [address]  [phone]      August 3, 2018      Awilda Campos  [address] [facility] [address]              Dear Awilda,    The results of your recent Quad Screen test was normal.  If you have any questions or concerns, please call the clinic at [phone].        Sincerely,    Casandra Sanchez MD

## 2018-07-30 NOTE — MR AVS SNAPSHOT
After Visit Summary   7/30/2018    Awilda Campos    MRN: [id number]           Patient Information     Date Of Birth          1985        Visit Information        Provider Department      7/30/2018 4:30 PM Casandra Sanchez MD [facility]        Today's Diagnoses     Encounter for supervision of other normal pregnancy, unspecified trimester           Follow-ups after your visit        Your next 10 appointments already scheduled     Aug 22, 2018  4:00 PM CDT   US OB > 14 WEEKS COMPLETE SINGLE with RDUS1   [facility]    [address] [place] [address]  [address] [place] [id number] [phone]           Please bring a list of your medicines (including vitamins, minerals and over-the-counter drugs). Also, tell your doctor about any allergies you may have. Wear comfortable clothes and leave your valuables at home.  Drink four 8-ounce glasses of fluid an hour before your exam. If you need to empty your bladder before your exam, try to release only a little urine. Then, drink another glass of fluid.  You may have up to two family members in the exam room. If you bring a small child, an adult must be there to care for him or her. No video or camera photography during the procedure.  Please call the Imaging Department at your exam site with any questions.            Aug 22, 2018  4:45 PM CDT   ESTABLISHED PRENATAL with Kim Giraldo MD   [facility]    [address] [place] [address]  [address] [place] [id number] [phone]              Who to contact     If you have questions or need follow up information about today's clinic visit or your schedule please contact [facility] directly at [phone].  Normal or non-critical lab and imaging results will be communicated to you by MyChart, letter or phone within 4 business days after the clinic has received the results. If you do not hear from  "us within 7 days, please contact the clinic through [employer] or phone. If you have a critical or abnormal lab result, we will notify you by phone as soon as possible.  Submit refill requests through [employer] or call your pharmacy and they will forward the refill request to us. Please allow 3 business days for your refill to be completed.          Additional Information About Your Visit        [employer] Information     [employer] lets you send messages to your doctor, view your test results, renew your prescriptions, schedule appointments and more. To sign up, go to [email or web address] . Click on \"Log in\" on the left side of the screen, which will take you to the Welcome page. Then click on \"Sign up Now\" on the right side of the page.     You will be asked to enter the access code listed below, as well as some personal information. Please follow the directions to create your username and password.     Your access code is: [email or web address]  Expires: 10/28/2018  4:30 PM     Your access code will  in 90 days. If you need help or a new code, please call your [place] clinic or [phone].        Care EveryWhere ID     This is your Care EveryWhere ID. This could be used by other organizations to access your [place] medical records  [id number]        Your Vitals Were     Pulse Last Period Pulse Oximetry Breastfeeding? BMI (Body Mass Index)       84 04/10/2018 97% No 28.96 kg/m2        Blood Pressure from Last 3 Encounters:   18 107/62   18 108/69   18 115/70    Weight from Last 3 Encounters:   18 174 lb (78.9 kg)   18 170 lb (77.1 kg)   18 168 lb (76.2 kg)              We Performed the Following     Maternal Quad Marker 2nd Trimester        Primary Care Provider Fax #    Physician No Ref-Primary [phone]       No address on file        Equal Access to Services     MIRTHA JACKSON AH: Ana Luisa Sampson, camila brooks, denise hickman, nathaniel vang " isaiah martinez ah. So [facility] [phone].    ATENCIÓN: Si jaquelinela reyna, tiene a dobson disposición servicios gratuitos de asistencia lingüística. Amy al [phone].    We comply with applicable federal civil rights laws and [place] laws. We do not discriminate on the basis of race, color, national origin, age, disability, sex, sexual orientation, or gender identity.            Thank you!     Thank you for choosing [facility]  for your care. Our goal is always to provide you with excellent care. Hearing back from our patients is one way we can continue to improve our services. Please take a few minutes to complete the written survey that you may receive in the mail after your visit with us. Thank you!             Your Updated Medication List - Protect others around you: Learn how to safely use, store and throw away your medicines at www.disposemymeds.org.          This list is accurate as of 7/30/18  5:16 PM.  Always use your most recent med list.                   Brand Name Dispense Instructions for use Diagnosis    albuterol 108 (90 Base) MCG/ACT Inhaler    PROAIR HFA/PROVENTIL HFA/VENTOLIN HFA    1 Inhaler    Inhale 2 puffs into the lungs every 6 hours as needed for shortness of breath / dyspnea or wheezing    Mild intermittent asthma without complication       ALBUTEROL IN           LEVOTHYROXINE SODIUM PO      Take 88 mcg by mouth        LIOTHYRONINE SODIUM PO      Take 5 mcg by mouth        PRENATAL + DHA PO           VITAMIN D-3 PO

## 2018-08-03 LAB
# FETUSES US: NORMAL
# FETUSES: NORMAL
AFP ADJ MOM AMN: 0.85
AFP SERPL-MCNC: 26 NG/ML
AGE - REPORTED: 32.9 YR
CURRENT SMOKER: NO
CURRENT SMOKER: NO
DIABETES STATUS PATIENT: NO
FAMILY MEMBER DISEASES HX: NO
FAMILY MEMBER DISEASES HX: NO
GA METHOD: NORMAL
GA METHOD: NORMAL
GA: NORMAL WK
HCG MOM SERPL: 2.01
HCG SERPL-ACNC: NORMAL IU/L
HX OF HEREDITARY DISORDERS: NO
IDDM PATIENT QL: NO
INHIBIN A MOM SERPL: 1.22
INHIBIN A SERPL-MCNC: 191 PG/ML
INTEGRATED SCN PATIENT-IMP: NORMAL
IVF PREGNANCY: NO
LMP START DATE: NORMAL
MONOCHORIONIC TWINS: NO
PATHOLOGY STUDY: NORMAL
PREV FETUS DEFECT: NO
SERVICE CMNT-IMP: NO
SPECIMEN DRAWN SERPL: NORMAL
U ESTRIOL MOM SERPL: 1.03
U ESTRIOL SERPL-MCNC: 0.94 NG/ML
VALPROIC/CARBAMAZEPINE STATUS: NO
WEIGHT UNITS: NORMAL

## 2018-08-22 ENCOUNTER — PRENATAL OFFICE VISIT (OUTPATIENT)
Dept: OBGYN | Facility: CLINIC | Age: 33
End: 2018-08-22
Attending: OBSTETRICS & GYNECOLOGY
Payer: COMMERCIAL

## 2018-08-22 ENCOUNTER — RADIANT APPOINTMENT (OUTPATIENT)
Dept: ULTRASOUND IMAGING | Facility: CLINIC | Age: 33
End: 2018-08-22
Attending: OBSTETRICS & GYNECOLOGY
Payer: COMMERCIAL

## 2018-08-22 VITALS
WEIGHT: 173 LBS | HEART RATE: 84 BPM | DIASTOLIC BLOOD PRESSURE: 74 MMHG | BODY MASS INDEX: 28.79 KG/M2 | SYSTOLIC BLOOD PRESSURE: 117 MMHG

## 2018-08-22 DIAGNOSIS — Z34.80 ENCOUNTER FOR SUPERVISION OF OTHER NORMAL PREGNANCY, UNSPECIFIED TRIMESTER: Primary | ICD-10-CM

## 2018-08-22 DIAGNOSIS — Z34.80 ENCOUNTER FOR SUPERVISION OF OTHER NORMAL PREGNANCY, UNSPECIFIED TRIMESTER: ICD-10-CM

## 2018-08-22 DIAGNOSIS — O44.02 PLACENTA PREVIA IN SECOND TRIMESTER: ICD-10-CM

## 2018-08-22 PROCEDURE — 99207 ZZC PRENATAL VISIT: CPT | Performed by: OBSTETRICS & GYNECOLOGY

## 2018-08-22 PROCEDURE — 76805 OB US >/= 14 WKS SNGL FETUS: CPT | Performed by: OBSTETRICS & GYNECOLOGY

## 2018-08-22 NOTE — PROGRESS NOTES
19w4d feeling ok, some low back/tailbone pain.  Discussed.  Has seen the chiropractor, will call if needs PT.  Feeling mvmt.  Has survey, having a boy.  Unable to see kidneys well, normal YUNIOR, will repeat 1-2wks.  Complete previa.  We discussed what it is and risk for bleeding.  Discussed need for pelvic rest and avoiding strenuous activity until resolved.  I explained that complete previas often do not resolve and if that is the case, will need a c/s at 36-37wks.  Instructed her to call with any bleeding or spotting.  Questions answered.  jordy next visit.  RTC 4-5wks  lawson

## 2018-08-22 NOTE — MR AVS SNAPSHOT
After Visit Summary   8/22/2018    Awilda Campos    MRN: [id number]           Patient Information     Date Of Birth          1985        Visit Information        Provider Department      8/22/2018 4:45 PM Kim Giraldo MD [facility]        Today's Diagnoses     Encounter for supervision of other normal pregnancy, unspecified trimester    -  1    Placenta previa in second trimester           Follow-ups after your visit        Your next 10 appointments already scheduled     Aug 22, 2018  4:45 PM CDT   ESTABLISHED PRENATAL with Kim Giraldo MD   [facility]    [address] [place] [address]   [phone]            Aug 31, 2018  4:00 PM CDT   US OB SINGLE FOLLOW UP REPEAT with RDUS1   [facility]    [address] [place] [id number] [phone]           Please bring a list of your medicines (including vitamins, minerals and over-the-counter drugs). Also, tell your doctor about any allergies you may have. Wear comfortable clothes and leave your valuables at home.  Drink four 8-ounce glasses of fluid an hour before your exam. If you need to empty your bladder before your exam, try to release only a little urine. Then, drink another glass of fluid.  You may have up to two family members in the exam room. If you bring a small child, an adult must be there to care for him or her. No video or camera photography during the procedure.  Please call the Imaging Department at your exam site with any questions.            Sep 25, 2018  3:30 PM CDT   ESTABLISHED PRENATAL with Kim Giraldo MD   [facility]    [address] [place] [id number] [phone]              Future tests that were ordered for you today     Open Future Orders        Priority Expected Expires  Ordered    US OB Ltd One Or More Fetus FU/Repeat Routine  8/22/2019 8/22/2018            Who to contact     If you have questions or need follow up information about today's clinic visit or your schedule please contact [facility] directly at [phone].  Normal or non-critical lab and imaging results will be communicated to you by MyChart, letter or phone within 4 business days after the clinic has received the results. If you do not hear from us within 7 days, please contact the clinic through MyChart or phone. If you have a critical or abnormal lab result, we will notify you by phone as soon as possible.  Submit refill requests through [employer] or call your pharmacy and they will forward the refill request to us. Please allow 3 business days for your refill to be completed.          Additional Information About Your Visit        MyChart Information     [employer] gives you secure access to your electronic health record. If you see a primary care provider, you can also send messages to your care team and make appointments. If you have questions, please call your primary care clinic.  If you do not have a primary care provider, please call [phone] and they will assist you.        Care EveryWhere ID     This is your Care EveryWhere ID. This could be used by other organizations to access your [place] medical records  [id number]        Your Vitals Were     Pulse Last Period BMI (Body Mass Index)             84 04/10/2018 28.79 kg/m2          Blood Pressure from Last 3 Encounters:   08/22/18 117/74   07/30/18 107/62   07/06/18 108/69    Weight from Last 3 Encounters:   08/22/18 173 lb (78.5 kg)   07/30/18 174 lb (78.9 kg)   07/06/18 170 lb (77.1 kg)               Primary Care Provider Fax #    Physician No Ref-Primary [phone]       No address on file        Equal Access to Services     MIRTHA JACKSON : camila Nolasco, nathaniel taylor  taj rodriguezmannybhavik perez'aawindy ah. So [facility] [phone].    ATENCIÓN: Si habla reyna, tiene a dobson disposición servicios gratuitos de asistencia lingüística. Amy al [phone].    We comply with applicable federal civil rights laws and [place] laws. We do not discriminate on the basis of race, color, national origin, age, disability, sex, sexual orientation, or gender identity.            Thank you!     Thank you for choosing [facility]  for your care. Our goal is always to provide you with excellent care. Hearing back from our patients is one way we can continue to improve our services. Please take a few minutes to complete the written survey that you may receive in the mail after your visit with us. Thank you!             Your Updated Medication List - Protect others around you: Learn how to safely use, store and throw away your medicines at www.disposemymeds.org.          This list is accurate as of 8/22/18  4:42 PM.  Always use your most recent med list.                   Brand Name Dispense Instructions for use Diagnosis    albuterol 108 (90 Base) MCG/ACT inhaler    PROAIR HFA/PROVENTIL HFA/VENTOLIN HFA    1 Inhaler    Inhale 2 puffs into the lungs every 6 hours as needed for shortness of breath / dyspnea or wheezing    Mild intermittent asthma without complication       ALBUTEROL IN           LEVOTHYROXINE SODIUM PO      Take 88 mcg by mouth        LIOTHYRONINE SODIUM PO      Take 5 mcg by mouth        PRENATAL + DHA PO           VITAMIN D-3 PO

## 2018-08-31 ENCOUNTER — RADIANT APPOINTMENT (OUTPATIENT)
Dept: ULTRASOUND IMAGING | Facility: CLINIC | Age: 33
End: 2018-08-31
Attending: OBSTETRICS & GYNECOLOGY
Payer: COMMERCIAL

## 2018-08-31 DIAGNOSIS — Z34.80 ENCOUNTER FOR SUPERVISION OF OTHER NORMAL PREGNANCY, UNSPECIFIED TRIMESTER: ICD-10-CM

## 2018-08-31 PROCEDURE — 76816 OB US FOLLOW-UP PER FETUS: CPT | Performed by: OBSTETRICS & GYNECOLOGY

## 2018-09-25 ENCOUNTER — PRENATAL OFFICE VISIT (OUTPATIENT)
Dept: OBGYN | Facility: CLINIC | Age: 33
End: 2018-09-25
Payer: COMMERCIAL

## 2018-09-25 VITALS
SYSTOLIC BLOOD PRESSURE: 117 MMHG | BODY MASS INDEX: 30.97 KG/M2 | WEIGHT: 186.1 LBS | DIASTOLIC BLOOD PRESSURE: 73 MMHG | HEART RATE: 85 BPM

## 2018-09-25 DIAGNOSIS — Z34.80 ENCOUNTER FOR SUPERVISION OF OTHER NORMAL PREGNANCY, UNSPECIFIED TRIMESTER: ICD-10-CM

## 2018-09-25 DIAGNOSIS — M54.50 BILATERAL LOW BACK PAIN WITHOUT SCIATICA, UNSPECIFIED CHRONICITY: Primary | ICD-10-CM

## 2018-09-25 DIAGNOSIS — E06.3 HASHIMOTO'S THYROIDITIS: ICD-10-CM

## 2018-09-25 DIAGNOSIS — O44.02 PLACENTA PREVIA IN SECOND TRIMESTER: ICD-10-CM

## 2018-09-25 LAB
GLUCOSE 1H P 50 G GLC PO SERPL-MCNC: 115 MG/DL (ref 60–129)
HGB BLD-MCNC: 10.6 G/DL (ref 11.7–15.7)

## 2018-09-25 PROCEDURE — 99207 ZZC PRENATAL VISIT: CPT | Performed by: OBSTETRICS & GYNECOLOGY

## 2018-09-25 PROCEDURE — 00000218 ZZHCL STATISTIC OBHBG - HEMOGLOBIN: Performed by: OBSTETRICS & GYNECOLOGY

## 2018-09-25 PROCEDURE — 82950 GLUCOSE TEST: CPT | Performed by: OBSTETRICS & GYNECOLOGY

## 2018-09-25 PROCEDURE — 36415 COLL VENOUS BLD VENIPUNCTURE: CPT | Performed by: OBSTETRICS & GYNECOLOGY

## 2018-09-25 PROCEDURE — 84443 ASSAY THYROID STIM HORMONE: CPT | Performed by: OBSTETRICS & GYNECOLOGY

## 2018-09-25 RX ORDER — LIOTHYRONINE SODIUM 5 UG/1
5 TABLET ORAL DAILY
Qty: 90 TABLET | Refills: 1 | Status: SHIPPED | OUTPATIENT
Start: 2018-09-25 | End: 2019-04-25

## 2018-09-25 RX ORDER — LEVOTHYROXINE SODIUM 88 UG/1
88 CAPSULE ORAL DAILY
Qty: 90 CAPSULE | Refills: 1 | Status: SHIPPED | OUTPATIENT
Start: 2018-09-25 | End: 2019-04-23

## 2018-09-25 ASSESSMENT — ANXIETY QUESTIONNAIRES
5. BEING SO RESTLESS THAT IT IS HARD TO SIT STILL: NOT AT ALL
2. NOT BEING ABLE TO STOP OR CONTROL WORRYING: NOT AT ALL
1. FEELING NERVOUS, ANXIOUS, OR ON EDGE: NOT AT ALL
6. BECOMING EASILY ANNOYED OR IRRITABLE: NOT AT ALL
GAD7 TOTAL SCORE: 0
7. FEELING AFRAID AS IF SOMETHING AWFUL MIGHT HAPPEN: NOT AT ALL
3. WORRYING TOO MUCH ABOUT DIFFERENT THINGS: NOT AT ALL

## 2018-09-25 ASSESSMENT — PATIENT HEALTH QUESTIONNAIRE - PHQ9: 5. POOR APPETITE OR OVEREATING: NOT AT ALL

## 2018-09-25 NOTE — MR AVS SNAPSHOT
After Visit Summary   9/25/2018    Awilda Campos    MRN: [id number]           Patient Information     Date Of Birth          1985        Visit Information        Provider Department      9/25/2018 3:30 PM Kim Giraldo MD [facility]        Today's Diagnoses     Bilateral low back pain without sciatica, unspecified chronicity    -  1    Encounter for supervision of other normal pregnancy, unspecified trimester        Hashimoto's thyroiditis        Placenta previa in second trimester           Follow-ups after your visit        Additional Services     [facility] PT, HAND, AND CHIROPRACTIC REFERRAL       Physical Therapy, Hand Therapy and Chiropractic Care are available through:  *[place] [facility]  *Hand Therapy (Occupational Therapy or Physical Therapy)  *[facility]    Call one number to schedule at any of the above locations: [phone].    Physical therapy, Hand therapy and/or Chiropractic care has been recommended by your physician as an excellent treatment option to reduce pain and help people return to normal activities, including sports.  Therapy and/or chiropractic care services are a great complement or alternative to expensive and invasive surgery, injections, or long-term use of prescription medications. The primary goal is to identify the underlying problem and provide you the tools to manage your condition on your own.     Please be aware that coverage of these services is subject to the terms and limitations of your health insurance plan.  Call member services at your health plan with any benefit or coverage questions.      Please bring the following to your appointment:  *Your personal calendar for scheduling future appointments  *Comfortable clothing                  Your next 10 appointments already scheduled     Oct 25, 2018  4:00 PM CDT   US OB SINGLE FOLLOW UP REPEAT with RDUS1   [facility] ([facility]  [place] [address] [place] [address]  [facility] [address]   [phone]           How do I prepare for my exam? (Food and drink instructions) Drink four 8-ounce glasses of fluid an hour before your exam. If you need to empty your bladder before your exam, try to release only a little urine. Then, drink another glass of fluid.  How do I prepare for my exam? (Other instructions) You may have up to two family members in the exam room. If you bring a small child, an adult must be there to care for him or her. No video or camera photography during the procedure.  What should I wear: Wear comfortable clothes.  How long does the exam take: Most ultrasounds take 30 to 60 minutes.  What should I bring: Bring a list of your medicines, including vitamins, minerals and over-the-counter drugs. It is safest to leave personal items at home.  Do I need a :  No  is needed.  What do I need to tell my doctor: Tell your doctor about any allergies you may have.  What should I do after the exam: No restrictions, You may resume normal activities.  What is this test: An ultrasound uses sound waves to make pictures of the body. Sound waves do not cause pain. The only discomfort may be the pressure of the wand against your skin or full bladder.  Who should I call with questions: If you have any questions, please call the Imaging Department where you will have your exam. Directions, parking instructions, and other information is available on our website, [email or web address].            Oct 25, 2018  4:45 PM CDT   ESTABLISHED PRENATAL with Kim Giraldo MD   [facility] ([facility])    [address] [place] [address]   [phone]              Future tests that were ordered for you today     Open Future Orders        Priority Expected Expires Ordered    US OB Ltd One Or More Fetus FU/Repeat Routine  9/25/2019 9/25/2018    [facility] PT, HAND, AND CHIROPRACTIC  REFERRAL Routine  9/25/2019 9/25/2018            Who to contact     If you have questions or need follow up information about today's clinic visit or your schedule please contact [facility] directly at [phone].  Normal or non-critical lab and imaging results will be communicated to you by Quiphart, letter or phone within 4 business days after the clinic has received the results. If you do not hear from us within 7 days, please contact the clinic through Quiphart or phone. If you have a critical or abnormal lab result, we will notify you by phone as soon as possible.  Submit refill requests through SpiderOak or call your pharmacy and they will forward the refill request to us. Please allow 3 business days for your refill to be completed.          Additional Information About Your Visit        QuipharThe New Motion Information     SpiderOak gives you secure access to your electronic health record. If you see a primary care provider, you can also send messages to your care team and make appointments. If you have questions, please call your primary care clinic.  If you do not have a primary care provider, please call [phone] and they will assist you.        Care EveryWhere ID     This is your Care EveryWhere ID. This could be used by other organizations to access your [place] medical records  [id number]        Your Vitals Were     Pulse Last Period BMI (Body Mass Index)             85 04/10/2018 30.97 kg/m2          Blood Pressure from Last 3 Encounters:   09/25/18 117/73   08/22/18 117/74   07/30/18 107/62    Weight from Last 3 Encounters:   09/25/18 186 lb 1.6 oz (84.4 kg)   08/22/18 173 lb (78.5 kg)   07/30/18 174 lb (78.9 kg)              We Performed the Following     Glucose tolerance gest screen 1 hour     OB hemoglobin     TSH with free T4 reflex          Today's Medication Changes          These changes are accurate as of 9/25/18  5:25 PM.  If you have any questions, ask your nurse or doctor.                These medicines have changed or have updated prescriptions.        Dose/Directions    Levothyroxine Sodium 88 MCG Caps   Indication:  Underactive Thyroid   This may have changed:    - medication strength  - when to take this   Used for:  Hashimoto's thyroiditis   Changed by:  Kim Giraldo MD        Dose:  88 mcg   Take 88 mcg by mouth daily   Quantity:  90 capsule   Refills:  1       liothyronine 5 MCG tablet   Commonly known as:  CYTOMEL   This may have changed:  when to take this   Used for:  Hashimoto's thyroiditis   Changed by:  Kim Giraldo MD        Dose:  5 mcg   Take 1 tablet (5 mcg) by mouth daily   Quantity:  90 tablet   Refills:  1            Where to get your medicines      These medications were sent to [facility] [address] [place] [id number] [facility] [address] [facility] [address]     Phone:  [phone]     Levothyroxine Sodium 88 MCG Caps    liothyronine 5 MCG tablet                Primary Care Provider Fax #    Physician No Ref-Primary [phone]       No address on file        Equal Access to Services     [facility]: Hadii jose antonio luoo Sosanjeev, waaxda luqadaha, qaybta kaalmamarium hickman, nathaniel martinez . So [facility] [phone].    ATENCIÓN: Si habla español, tiene a dobson disposición servicios gratuitos de asistencia lingüística. Ll[facility] [phone].    We comply with applicable federal civil rights laws and [place] laws. We do not discriminate on the basis of race, color, national origin, age, disability, sex, sexual orientation, or gender identity.            Thank you!     Thank you for choosing [facility]  for your care. Our goal is always to provide you with excellent care. Hearing back from our patients is one way we can continue to improve our services. Please take a few minutes to complete the written survey that you may receive in the mail after your visit with us.  Thank you!             Your Updated Medication List - Protect others around you: Learn how to safely use, store and throw away your medicines at www.disposemymeds.org.          This list is accurate as of 9/25/18  5:25 PM.  Always use your most recent med list.                   Brand Name Dispense Instructions for use Diagnosis    albuterol 108 (90 Base) MCG/ACT inhaler    PROAIR HFA/PROVENTIL HFA/VENTOLIN HFA    1 Inhaler    Inhale 2 puffs into the lungs every 6 hours as needed for shortness of breath / dyspnea or wheezing    Mild intermittent asthma without complication       ALBUTEROL IN           Levothyroxine Sodium 88 MCG Caps     90 capsule    Take 88 mcg by mouth daily    Hashimoto's thyroiditis       liothyronine 5 MCG tablet    CYTOMEL    90 tablet    Take 1 tablet (5 mcg) by mouth daily    Hashimoto's thyroiditis       PRENATAL + DHA PO           VITAMIN D-3 PO

## 2018-09-25 NOTE — PROGRESS NOTES
24w3d Feeling ok, but noticing significant back/hip and calf pain at the end of the day.  Leg cramps at night, discussed.  We discussed heat and support belt.  Referral for PT.  Good fm.  No bleeding. Will plan for us with next visit for growth and to check placenta. Several questions about c/s, LOS and if dad can stay in hospital with her after c/s.  Breastfeeding questions as well, discussed or lactation staff at the hospital as well as classes we are now offering here.  glucola today.  Would like to wait until next visit for flu shot.  RTC 4 weeks  jlp

## 2018-09-26 LAB — TSH SERPL DL<=0.005 MIU/L-ACNC: 2.75 MU/L (ref 0.4–4)

## 2018-09-26 ASSESSMENT — PATIENT HEALTH QUESTIONNAIRE - PHQ9: SUM OF ALL RESPONSES TO PHQ QUESTIONS 1-9: 2

## 2018-09-26 ASSESSMENT — ANXIETY QUESTIONNAIRES: GAD7 TOTAL SCORE: 0

## 2018-10-16 ENCOUNTER — THERAPY VISIT (OUTPATIENT)
Dept: PHYSICAL THERAPY | Facility: CLINIC | Age: 33
End: 2018-10-16
Payer: COMMERCIAL

## 2018-10-16 DIAGNOSIS — M54.50 LOWER BACK PAIN: Primary | ICD-10-CM

## 2018-10-16 DIAGNOSIS — M54.50 BILATERAL LOW BACK PAIN WITHOUT SCIATICA, UNSPECIFIED CHRONICITY: ICD-10-CM

## 2018-10-16 PROCEDURE — 97110 THERAPEUTIC EXERCISES: CPT | Mod: GP | Performed by: PHYSICAL THERAPIST

## 2018-10-16 PROCEDURE — 97161 PT EVAL LOW COMPLEX 20 MIN: CPT | Mod: GP | Performed by: PHYSICAL THERAPIST

## 2018-10-16 NOTE — MR AVS SNAPSHOT
After Visit Summary   10/16/2018    Awilda Campos    MRN: [id number]           Patient Information     Date Of Birth          1985        Visit Information        Provider Department      10/16/2018 4:20 PM Philip Pt, GABRIELA Todd [place] [facility]        Today's Diagnoses     Lower back pain    -  1    Bilateral low back pain without sciatica, unspecified chronicity           Follow-ups after your visit        Your next 10 appointments already scheduled     Oct 24, 2018  3:20 PM CDT   [facility] Spine with Lyubov Hammer PT   [place] [facility] ([facility]  )    [address]  [facility] [address]   [phone]            Oct 25, 2018  4:00 PM CDT   US OB >14 WEEKS FOLLOW UP with RDUS1   [facility] ([facility])    [address] [place] [address] [address]  [address]  [facility] [address] [phone]           How do I prepare for my exam? (Food and drink instructions) Drink four 8-ounce glasses of fluid an hour before your exam. If you need to empty your bladder before your exam, try to release only a little urine. Then, drink another glass of fluid.  How do I prepare for my exam? (Other instructions) You may have up to two family members in the exam room. If you bring a small child, an adult must be there to care for him or her. No video or camera photography during the procedure.  What should I wear: Wear comfortable clothes.  How long does the exam take: Most ultrasounds take 30 to 60 minutes.  What should I bring: Bring a list of your medicines, including vitamins, minerals and over-the-counter drugs. It is safest to leave personal items at home.  Do I need a :  No  is needed.  What do I need to tell my doctor: Tell your doctor about any allergies you may have.  What should I do after the exam: No restrictions, You may resume normal activities.  What is this test: An ultrasound  uses sound waves to make pictures of the body. Sound waves do not cause pain. The only discomfort may be the pressure of the wand against your skin or full bladder.  Who should I call with questions: If you have any questions, please call the Imaging Department where you will have your exam. Directions, parking instructions, and other information is available on our website, [email or web address].            Oct 25, 2018  4:45 PM CDT   ESTABLISHED PRENATAL with Kim Giraldo MD   [facility] ([facility])    [address] [place] [address]  [facility] [id number] [phone]            Nov 05, 2018  3:20 PM CST   [facility] Spine with Lyubov Hammer PT   [place] [facility] ([facility]  )    [address]  [facility] [address] [phone]              Who to contact     If you have questions or need follow up information about today's clinic visit or your schedule please contact [place] [facility]ERAP[facility] directly at [phone].  Normal or non-critical lab and imaging results will be communicated to you by Tymphanyhart, letter or phone within 4 business days after the clinic has received the results. If you do not hear from us within 7 days, please contact the clinic through Tymphanyhart or phone. If you have a critical or abnormal lab result, we will notify you by phone as soon as possible.  Submit refill requests through Wikisway or call your pharmacy and they will forward the refill request to us. Please allow 3 business days for your refill to be completed.          Additional Information About Your Visit        Tymphanyhart Information     Wikisway gives you secure access to your electronic health record. If you see a primary care provider, you can also send messages to your care team and make appointments. If you have questions, please call your primary care clinic.  If you do not have a primary care  provider, please call [phone] and they will assist you.        Care EveryWhere ID     This is your Care EveryWhere ID. This could be used by other organizations to access your [place] medical records  [id number]        Your Vitals Were     Last Period                   04/10/2018            Blood Pressure from Last 3 Encounters:   09/25/18 117/73   08/22/18 117/74   07/30/18 107/62    Weight from Last 3 Encounters:   09/25/18 84.4 kg (186 lb 1.6 oz)   08/22/18 78.5 kg (173 lb)   07/30/18 78.9 kg (174 lb)              We Performed the Following     HC PT EVAL, LOW COMPLEXITY     [facility] INITIAL EVAL REPORT     [facility] PT, HAND, AND CHIROPRACTIC REFERRAL     THERAPEUTIC EXERCISES        Primary Care Provider Fax #    Physician No Ref-Primary [phone]       No address on file        Equal Access to Services     MIRTHA JACKSON : Hadsandra Sampson, wageneva brooks, denise hickman, nathaniel martinez . So [facility] [phone].    ATENCIÓN: Si habla español, tiene a dobson disposición servicios gratuitos de asistencia lingüística. Llame al [phone].    We comply with applicable federal civil rights laws and [place] laws. We do not discriminate on the basis of race, color, national origin, age, disability, sex, sexual orientation, or gender identity.            Thank you!     Thank you for choosing [facility]  for your care. Our goal is always to provide you with excellent care. Hearing back from our patients is one way we can continue to improve our services. Please take a few minutes to complete the written survey that you may receive in the mail after your visit with us. Thank you!             Your Updated Medication List - Protect others around you: Learn how to safely use, store and throw away your medicines at www.disposemymeds.org.          This list is accurate as of 10/16/18  6:44 PM.  Always use your most recent med list.                    Brand Name Dispense Instructions for use Diagnosis    albuterol 108 (90 Base) MCG/ACT inhaler    PROAIR HFA/PROVENTIL HFA/VENTOLIN HFA    1 Inhaler    Inhale 2 puffs into the lungs every 6 hours as needed for shortness of breath / dyspnea or wheezing    Mild intermittent asthma without complication       ALBUTEROL IN           Levothyroxine Sodium 88 MCG Caps     90 capsule    Take 88 mcg by mouth daily    Hashimoto's thyroiditis       liothyronine 5 MCG tablet    CYTOMEL    90 tablet    Take 1 tablet (5 mcg) by mouth daily    Hashimoto's thyroiditis       PRENATAL + DHA PO           VITAMIN D-3 PO

## 2018-10-16 NOTE — PROGRESS NOTES
[place] [facility] Initial Evaluation  Subjective:  Patient is a 33 year old female presenting with rehab back hpi. The history is provided by the patient. No  was used.   Awilda Campos is a 33 year old female with a lumbar and sacroiliac condition.  Condition occurred with:  Insidious onset.  Condition occurred: other.  This is a new condition  Patient is 27 weeks pregnant with second child. She reports she did have minor LBP with 1st pregnancy. This pregnancy has been much worse. LBP started early and has gotten worse. Pain worse as day goes on and by early evening she has trouble standing any longer. She says her legs feel like they will give out. She is going to bed earlier to relieve the pain. She has adjustable bed allowing elevated head and legs she uses with heat pack and this reduces pain. MD 9/25/18.    Patient reports pain:  SI joint left and SI joint right.    Pain is described as aching and sharp and is intermittent and reported as 7/10.  Associated symptoms:  Pregnancy, fatigue and loss of strength. Pain is worse during the night and worse in the P.M..  Symptoms are exacerbated by lifting, certain positions, walking and standing and relieved by rest and heat.  Since onset symptoms are gradually worsening.      There was moderate improvement following previous treatment.    Pertinent medical history includes:  Currently pregnant, thyroid problems and asthma.  Medical allergies: no.  Other surgeries include:  No.  Current medications:  None as reported by the patient.  Current occupation is Horticultuist.  Patient is working in normal job without restrictions.  Primary job tasks include:  Lifting, prolonged sitting, repetitive tasks and prolonged standing.    Barriers include:  None as reported by the patient.    Red flags:  None as reported by the patient.                        Objective:  Standing Alignment:        Lumbar:  Sway back                           Lumbar/SI  "Evaluation  ROM:    AROM Lumbar:   Flexion:        80% \"feels better\"  Ext:                    80% \" feels worse\"   Side Bend:        Left:     Right:   Rotation:           Left:     Right:   Side Glide:        Left:     Right:         Strength: ab set relieves pain  Lumbar Myotomes:  normal            Lumbar DTR's:  normal      Cord Signs:  normal    Lumbar Dermtomes:  normal                Neural Tension/Mobility:  Lumbar:  Normal        Lumbar Palpation:    Tenderness present at Left:    PSIS  Tenderness present at Right: PSIS                                                     General     ROS    Assessment/Plan:    Patient is a 33 year old female with lumbar and sacral complaints.    Patient has the following significant findings with corresponding treatment plan.                Diagnosis 1:  B LBP  Pain -  self management, education, directional preference exercise and home program  Decreased strength - therapeutic exercise and therapeutic activities  Impaired muscle performance - neuro re-education  Decreased function - therapeutic activities  Impaired posture - neuro re-education    Therapy Evaluation Codes:   1) History comprised of:   Personal factors that impact the plan of care:      None.    Comorbidity factors that impact the plan of care are:      Asthma and Current pregnancy.     Medications impacting care: None.  2) Examination of Body Systems comprised of:   Body structures and functions that impact the plan of care:      Sacral illiac joint.   Activity limitations that impact the plan of care are:      Cooking, Dressing, Lifting and Standing.  3) Clinical presentation characteristics are:   Stable/Uncomplicated.  4) Decision-Making    Low complexity using standardized patient assessment instrument and/or measureable assessment of functional outcome.  Cumulative Therapy Evaluation is: Low complexity.    Previous and current functional limitations:  (See Goal Flow Sheet for this information)    Short " term and Long term goals: (See Goal Flow Sheet for this information)     Communication ability:  Patient appears to be able to clearly communicate and understand verbal and written communication and follow directions correctly.  Treatment Explanation - The following has been discussed with the patient:   RX ordered/plan of care  Anticipated outcomes  Possible risks and side effects  This patient would benefit from PT intervention to resume normal activities.   Rehab potential is excellent.    Frequency:  1 X week, once daily  Duration:  for 8 weeks  Discharge Plan:  Achieve all LTG.  Independent in home treatment program.  Reach maximal therapeutic benefit.    Please refer to the daily flowsheet for treatment today, total treatment time and time spent performing 1:1 timed codes.

## 2018-10-24 ENCOUNTER — THERAPY VISIT (OUTPATIENT)
Dept: PHYSICAL THERAPY | Facility: CLINIC | Age: 33
End: 2018-10-24
Payer: COMMERCIAL

## 2018-10-24 DIAGNOSIS — M54.50 LOWER BACK PAIN: ICD-10-CM

## 2018-10-24 PROCEDURE — 97110 THERAPEUTIC EXERCISES: CPT | Mod: GP | Performed by: PHYSICAL THERAPIST

## 2018-10-24 PROCEDURE — 97112 NEUROMUSCULAR REEDUCATION: CPT | Mod: GP | Performed by: PHYSICAL THERAPIST

## 2018-10-24 PROCEDURE — 97140 MANUAL THERAPY 1/> REGIONS: CPT | Mod: GP | Performed by: PHYSICAL THERAPIST

## 2018-10-24 NOTE — MR AVS SNAPSHOT
After Visit Summary   10/24/2018    Awilda Campos    MRN: [id number]           Patient Information     Date Of Birth          1985        Visit Information        Provider Department      10/24/2018 3:20 PM Lyubov Hammer, PT [place] [facility]        Today's Diagnoses     Lower back pain           Follow-ups after your visit        Your next 10 appointments already scheduled     Oct 25, 2018  4:00 PM CDT   US OB >14 WEEKS FOLLOW UP with RDUS1   [facility] ([facility])    [address] [place] [address]  [address] [place] [id number] [phone]           How do I prepare for my exam? (Food and drink instructions) Drink four 8-ounce glasses of fluid an hour before your exam. If you need to empty your bladder before your exam, try to release only a little urine. Then, drink another glass of fluid.  How do I prepare for my exam? (Other instructions) You may have up to two family members in the exam room. If you bring a small child, an adult must be there to care for him or her. No video or camera photography during the procedure.  What should I wear: Wear comfortable clothes.  How long does the exam take: Most ultrasounds take 30 to 60 minutes.  What should I bring: Bring a list of your medicines, including vitamins, minerals and over-the-counter drugs. It is safest to leave personal items at home.  Do I need a :  No  is needed.  What do I need to tell my doctor: Tell your doctor about any allergies you may have.  What should I do after the exam: No restrictions, You may resume normal activities.  What is this test: An ultrasound uses sound waves to make pictures of the body. Sound waves do not cause pain. The only discomfort may be the pressure of the wand against your skin or full bladder.  Who should I call with questions: If you have any questions, please call the Imaging Department where you will have your exam.  Directions, parking instructions, and other information is available on our website, [email or web address].            Oct 25, 2018  4:45 PM CDT   ESTABLISHED PRENATAL with Kim Giraldo MD   [facility] ([facility])    [address] [place] [address]  [address]  [facility] [id number] [phone]            Nov 05, 2018  3:20 PM CST   ARMOND Spine with Lyubov Hammer PT   [place] [facility] ([facility]  )    [address]  [facility] [address] [phone]              Who to contact     If you have questions or need follow up information about today's clinic visit or your schedule please contact [facility] directly at [phone].  Normal or non-critical lab and imaging results will be communicated to you by MyChart, letter or phone within 4 business days after the clinic has received the results. If you do not hear from us within 7 days, please contact the clinic through [employer] or phone. If you have a critical or abnormal lab result, we will notify you by phone as soon as possible.  Submit refill requests through [employer] or call your pharmacy and they will forward the refill request to us. Please allow 3 business days for your refill to be completed.          Additional Information About Your Visit        MyChart Information     [employer] gives you secure access to your electronic health record. If you see a primary care provider, you can also send messages to your care team and make appointments. If you have questions, please call your primary care clinic.  If you do not have a primary care provider, please call [phone] and they will assist you.        Care EveryWhere ID     This is your Care EveryWhere ID. This could be used by other organizations to access your [facility] medical records  [id number]        Your Vitals Were     Last Period                   04/10/2018             Blood Pressure from Last 3 Encounters:   09/25/18 117/73   08/22/18 117/74   07/30/18 107/62    Weight from Last 3 Encounters:   09/25/18 84.4 kg (186 lb 1.6 oz)   08/22/18 78.5 kg (173 lb)   07/30/18 78.9 kg (174 lb)              We Performed the Following     MANUAL THER TECH,1+REGIONS,EA 15 MIN     NEUROMUSCULAR RE-EDUCATION     THERAPEUTIC EXERCISES        Primary Care Provider Fax #    Physician No Ref-Primary [phone]       No address on file        Equal Access to Services     [facility]: Hadii aad manasa hadasho Sosanjeev, waaxda luqadaha, qaybta kaalmada vick, nathaniel martinez . So [facility] [phone].    ATENCIÓN: Si habla español, tiene a dobson disposición servicios gratuitos de asistencia lingüística. Llame al [phone].    We comply with applicable federal civil rights laws and [place] laws. We do not discriminate on the basis of race, color, national origin, age, disability, sex, sexual orientation, or gender identity.            Thank you!     Thank you for choosing [facility]  for your care. Our goal is always to provide you with excellent care. Hearing back from our patients is one way we can continue to improve our services. Please take a few minutes to complete the written survey that you may receive in the mail after your visit with us. Thank you!             Your Updated Medication List - Protect others around you: Learn how to safely use, store and throw away your medicines at www.disposemymeds.org.          This list is accurate as of 10/24/18  3:53 PM.  Always use your most recent med list.                   Brand Name Dispense Instructions for use Diagnosis    albuterol 108 (90 Base) MCG/ACT inhaler    PROAIR HFA/PROVENTIL HFA/VENTOLIN HFA    1 Inhaler    Inhale 2 puffs into the lungs every 6 hours as needed for shortness of breath / dyspnea or wheezing    Mild intermittent asthma without complication        ALBUTEROL IN           Levothyroxine Sodium 88 MCG Caps     90 capsule    Take 88 mcg by mouth daily    Hashimoto's thyroiditis       liothyronine 5 MCG tablet    CYTOMEL    90 tablet    Take 1 tablet (5 mcg) by mouth daily    Hashimoto's thyroiditis       PRENATAL + DHA PO           VITAMIN D-3 PO

## 2018-10-25 ENCOUNTER — RADIANT APPOINTMENT (OUTPATIENT)
Dept: ULTRASOUND IMAGING | Facility: CLINIC | Age: 33
End: 2018-10-25
Attending: OBSTETRICS & GYNECOLOGY
Payer: COMMERCIAL

## 2018-10-25 ENCOUNTER — PRENATAL OFFICE VISIT (OUTPATIENT)
Dept: OBGYN | Facility: CLINIC | Age: 33
End: 2018-10-25
Attending: OBSTETRICS & GYNECOLOGY
Payer: COMMERCIAL

## 2018-10-25 VITALS
WEIGHT: 190.4 LBS | SYSTOLIC BLOOD PRESSURE: 113 MMHG | BODY MASS INDEX: 31.68 KG/M2 | TEMPERATURE: 98.3 F | DIASTOLIC BLOOD PRESSURE: 65 MMHG | HEART RATE: 92 BPM

## 2018-10-25 DIAGNOSIS — O44.02 PLACENTA PREVIA IN SECOND TRIMESTER: ICD-10-CM

## 2018-10-25 DIAGNOSIS — Z34.80 ENCOUNTER FOR SUPERVISION OF OTHER NORMAL PREGNANCY, UNSPECIFIED TRIMESTER: Primary | ICD-10-CM

## 2018-10-25 DIAGNOSIS — Z23 NEED FOR PROPHYLACTIC VACCINATION AND INOCULATION AGAINST INFLUENZA: ICD-10-CM

## 2018-10-25 DIAGNOSIS — Z23 NEED FOR TDAP VACCINATION: ICD-10-CM

## 2018-10-25 PROCEDURE — 99207 ZZC PRENATAL VISIT: CPT | Performed by: OBSTETRICS & GYNECOLOGY

## 2018-10-25 PROCEDURE — 76816 OB US FOLLOW-UP PER FETUS: CPT | Performed by: OBSTETRICS & GYNECOLOGY

## 2018-10-25 PROCEDURE — 90471 IMMUNIZATION ADMIN: CPT | Performed by: OBSTETRICS & GYNECOLOGY

## 2018-10-25 PROCEDURE — 90472 IMMUNIZATION ADMIN EACH ADD: CPT | Performed by: OBSTETRICS & GYNECOLOGY

## 2018-10-25 PROCEDURE — 90686 IIV4 VACC NO PRSV 0.5 ML IM: CPT | Performed by: OBSTETRICS & GYNECOLOGY

## 2018-10-25 PROCEDURE — 90715 TDAP VACCINE 7 YRS/> IM: CPT | Performed by: OBSTETRICS & GYNECOLOGY

## 2018-10-25 NOTE — PROGRESS NOTES

## 2018-10-25 NOTE — PROGRESS NOTES
28w5d feeling good, no c/o.  Good fm.  Had growth and placental position check today.  Growth at 52%tile, placenta no longer a previa.  Discussed.  Tdap and flu shot today.  RTC 2 weeks  jlp

## 2018-10-25 NOTE — MR AVS SNAPSHOT
After Visit Summary   10/25/2018    Awilda Campos    MRN: [id number]           Patient Information     Date Of Birth          1985        Visit Information        Provider Department      10/25/2018 4:45 PM Kim Giraldo MD [facility]        Today's Diagnoses     Encounter for supervision of other normal pregnancy, unspecified trimester    -  1    Need for prophylactic vaccination and inoculation against influenza        Need for Tdap vaccination           Follow-ups after your visit        Your next 10 appointments already scheduled     Nov 05, 2018  3:20 PM CST   ARMOND Spine with Lyubov Hammer PT   [place] [facility] ([facility]  )    [address] [facility]  [facility] [address] [phone]            Nov 09, 2018  4:15 PM CST   ESTABLISHED PRENATAL with Kim Giraldo MD   [facility] ([facility])    [address] [place] [address]  [facility] [id number] [phone]            Nov 20, 2018  4:15 PM CST   ESTABLISHED PRENATAL with Kim Giraldo MD   [facility] ([facility])    [address] [place] [address]  [facility] [id number] [phone]            Dec 04, 2018  4:15 PM CST   ESTABLISHED PRENATAL with Kim Giraldo MD   [facility] ([facility]    [address] [place] [address]  [facility] [id number] [phone]            Dec 20, 2018  4:15 PM CST   ESTABLISHED PRENATAL with Kim Giraldo MD   [facility] ([facility]    [address] [place] [address]  [facility] [address] [phone]              Who to contact     If you have questions or need follow up information about today's clinic visit or your schedule please contact [facility] directly at [phone].  Normal or non-critical lab and imaging results will be communicated to you  by [employer], letter or phone within 4 business days after the clinic has received the results. If you do not hear from us within 7 days, please contact the clinic through [employer] or phone. If you have a critical or abnormal lab result, we will notify you by phone as soon as possible.  Submit refill requests through [employer] or call your pharmacy and they will forward the refill request to us. Please allow 3 business days for your refill to be completed.          Additional Information About Your Visit        ZayaharPeek[employer] Information     [employer] gives you secure access to your electronic health record. If you see a primary care provider, you can also send messages to your care team and make appointments. If you have questions, please call your primary care clinic.  If you do not have a primary care provider, please call [phone] and they will assist you.        Care EveryWhere ID     This is your Care EveryWhere ID. This could be used by other organizations to access your [facility] medical records  [id number]        Your Vitals Were     Pulse Temperature Last Period BMI (Body Mass Index)          92 98.3  F (36.8  C) (Oral) 04/10/2018 31.68 kg/m2         Blood Pressure from Last 3 Encounters:   10/25/18 113/65   09/25/18 117/73   08/22/18 117/74    Weight from Last 3 Encounters:   10/25/18 190 lb 6.4 oz (86.4 kg)   09/25/18 186 lb 1.6 oz (84.4 kg)   08/22/18 173 lb (78.5 kg)              We Performed the Following     FLU VACCINE, SPLIT VIRUS, IM (QUADRIVALENT) [96984]- >3 YRS     TDAP VACCINE (BOOSTRIX)     Vaccine Administration, Initial [00383]     VACCINE ADMINISTRATION, INITIAL        Primary Care Provider Fax #    Physician No Ref-Primary [phone]       No address on file        Equal Access to Services     KARIN JACKSON : Ana Luisa Sampson, camila brooks, nathaniel taylor. So [facility] [phone].    ATENCIÓN: deng Jacobsen  disposición servicios gratuitos de asistencia lingüística. Amy oreilly [phone].    We comply with applicable federal civil rights laws and [place] laws. We do not discriminate on the basis of race, color, national origin, age, disability, sex, sexual orientation, or gender identity.            Thank you!     Thank you for choosing [facility]  for your care. Our goal is always to provide you with excellent care. Hearing back from our patients is one way we can continue to improve our services. Please take a few minutes to complete the written survey that you may receive in the mail after your visit with us. Thank you!             Your Updated Medication List - Protect others around you: Learn how to safely use, store and throw away your medicines at www.disposemymeds.org.          This list is accurate as of 10/25/18  5:15 PM.  Always use your most recent med list.                   Brand Name Dispense Instructions for use Diagnosis    albuterol 108 (90 Base) MCG/ACT inhaler    PROAIR HFA/PROVENTIL HFA/VENTOLIN HFA    1 Inhaler    Inhale 2 puffs into the lungs every 6 hours as needed for shortness of breath / dyspnea or wheezing    Mild intermittent asthma without complication       ALBUTEROL IN           Levothyroxine Sodium 88 MCG Caps     90 capsule    Take 88 mcg by mouth daily    Hashimoto's thyroiditis       liothyronine 5 MCG tablet    CYTOMEL    90 tablet    Take 1 tablet (5 mcg) by mouth daily    Hashimoto's thyroiditis       PRENATAL + DHA PO           VITAMIN D-3 PO

## 2018-10-30 ENCOUNTER — HOSPITAL ENCOUNTER (OUTPATIENT)
Facility: CLINIC | Age: 33
Discharge: HOME | End: 2018-10-30
Attending: OBSTETRICS & GYNECOLOGY | Admitting: OBSTETRICS & GYNECOLOGY
Payer: OTHER MISCELLANEOUS

## 2018-10-30 ENCOUNTER — TELEPHONE (OUTPATIENT)
Dept: OBGYN | Facility: CLINIC | Age: 33
End: 2018-10-30

## 2018-10-30 VITALS
BODY MASS INDEX: 29.99 KG/M2 | DIASTOLIC BLOOD PRESSURE: 59 MMHG | HEIGHT: 65 IN | SYSTOLIC BLOOD PRESSURE: 110 MMHG | TEMPERATURE: 98.2 F | RESPIRATION RATE: 18 BRPM | WEIGHT: 180 LBS | HEART RATE: 82 BPM

## 2018-10-30 DIAGNOSIS — Z87.828 HISTORY OF MOTOR VEHICLE ACCIDENT: Primary | ICD-10-CM

## 2018-10-30 PROBLEM — Z36.89 ENCOUNTER FOR TRIAGE IN PREGNANT PATIENT: Status: ACTIVE | Noted: 2018-10-30

## 2018-10-30 LAB
APTT PPP: 25 SEC (ref 22–37)
ERYTHROCYTE [DISTWIDTH] IN BLOOD BY AUTOMATED COUNT: 14.6 % (ref 10–15)
FIBRINOGEN PPP-MCNC: 315 MG/DL (ref 200–420)
HCT VFR BLD AUTO: 35.9 % (ref 35–47)
HGB BLD-MCNC: 11.8 G/DL (ref 11.7–15.7)
HGB F BLD QL KLEIH BETKE: NORMAL
INR PPP: 1.12 (ref 0.86–1.14)
MCH RBC QN AUTO: 30.2 PG (ref 26.5–33)
MCHC RBC AUTO-ENTMCNC: 32.9 G/DL (ref 31.5–36.5)
MCV RBC AUTO: 92 FL (ref 78–100)
PLATELET # BLD AUTO: 182 10E9/L (ref 150–450)
RBC # BLD AUTO: 3.91 10E12/L (ref 3.8–5.2)
WBC # BLD AUTO: 8.5 10E9/L (ref 4–11)

## 2018-10-30 PROCEDURE — 85730 THROMBOPLASTIN TIME PARTIAL: CPT | Performed by: STUDENT IN AN ORGANIZED HEALTH CARE EDUCATION/TRAINING PROGRAM

## 2018-10-30 PROCEDURE — G0463 HOSPITAL OUTPT CLINIC VISIT: HCPCS | Mod: 25

## 2018-10-30 PROCEDURE — 59025 FETAL NON-STRESS TEST: CPT

## 2018-10-30 PROCEDURE — 99213 OFFICE O/P EST LOW 20 MIN: CPT | Mod: 25 | Performed by: OBSTETRICS & GYNECOLOGY

## 2018-10-30 PROCEDURE — 36415 COLL VENOUS BLD VENIPUNCTURE: CPT | Performed by: STUDENT IN AN ORGANIZED HEALTH CARE EDUCATION/TRAINING PROGRAM

## 2018-10-30 PROCEDURE — 59025 FETAL NON-STRESS TEST: CPT | Mod: 26 | Performed by: OBSTETRICS & GYNECOLOGY

## 2018-10-30 PROCEDURE — 85384 FIBRINOGEN ACTIVITY: CPT | Performed by: STUDENT IN AN ORGANIZED HEALTH CARE EDUCATION/TRAINING PROGRAM

## 2018-10-30 PROCEDURE — 85460 HEMOGLOBIN FETAL: CPT | Performed by: OBSTETRICS & GYNECOLOGY

## 2018-10-30 PROCEDURE — 85610 PROTHROMBIN TIME: CPT | Performed by: STUDENT IN AN ORGANIZED HEALTH CARE EDUCATION/TRAINING PROGRAM

## 2018-10-30 PROCEDURE — 85027 COMPLETE CBC AUTOMATED: CPT | Performed by: STUDENT IN AN ORGANIZED HEALTH CARE EDUCATION/TRAINING PROGRAM

## 2018-10-30 NOTE — LETTER
[facility]  [address]  [facility] [address]  Phone: [phone]    October 30, 2018        Awilda Campos  [address] [facility] [address]          To whom it may concern:    RE: Awilda Campos was seen today for medical care. She is safe to return to work on 10/31/18.    Please contact me for questions or concerns.      Sincerely,        Amy Schumer, MD
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
## 2018-10-30 NOTE — IP AVS SNAPSHOT
[facility]    [address]    [place] [address]    Phone:  [phone]                                       After Visit Summary   10/30/2018    Awilda Campos    MRN: [id number]           After Visit Summary Signature Page     I have received my discharge instructions, and my questions have been answered. I have discussed any challenges I see with this plan with the nurse or doctor.    ..........................................................................................................................................  Patient/Patient Representative Signature      ..........................................................................................................................................  Patient Representative Print Name and Relationship to Patient    ..................................................               ................................................  Date                                   Time    ..........................................................................................................................................  Reviewed by Signature/Title    ...................................................              ..............................................  Date                                               Time          22EPIC Rev 08/18
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
## 2018-10-31 ENCOUNTER — PRENATAL OFFICE VISIT (OUTPATIENT)
Dept: OBGYN | Facility: CLINIC | Age: 33
End: 2018-10-31
Payer: COMMERCIAL

## 2018-10-31 VITALS
WEIGHT: 189.3 LBS | HEART RATE: 94 BPM | BODY MASS INDEX: 31.5 KG/M2 | DIASTOLIC BLOOD PRESSURE: 66 MMHG | TEMPERATURE: 99.3 F | SYSTOLIC BLOOD PRESSURE: 115 MMHG

## 2018-10-31 DIAGNOSIS — Z87.828 HISTORY OF MOTOR VEHICLE ACCIDENT: ICD-10-CM

## 2018-10-31 DIAGNOSIS — Z34.80 ENCOUNTER FOR SUPERVISION OF OTHER NORMAL PREGNANCY, UNSPECIFIED TRIMESTER: Primary | ICD-10-CM

## 2018-10-31 LAB
APTT PPP: 27 SEC (ref 22–37)
ERYTHROCYTE [DISTWIDTH] IN BLOOD BY AUTOMATED COUNT: 15.1 % (ref 10–15)
FIBRINOGEN PPP-MCNC: 291 MG/DL (ref 200–420)
HCT VFR BLD AUTO: 32.8 % (ref 35–47)
HGB BLD-MCNC: 10.9 G/DL (ref 11.7–15.7)
INR PPP: 1 (ref 0.86–1.14)
MCH RBC QN AUTO: 30.4 PG (ref 26.5–33)
MCHC RBC AUTO-ENTMCNC: 33.2 G/DL (ref 31.5–36.5)
MCV RBC AUTO: 92 FL (ref 78–100)
PLATELET # BLD AUTO: 213 10E9/L (ref 150–450)
RBC # BLD AUTO: 3.58 10E12/L (ref 3.8–5.2)
WBC # BLD AUTO: 7.1 10E9/L (ref 4–11)

## 2018-10-31 PROCEDURE — 85384 FIBRINOGEN ACTIVITY: CPT | Performed by: OBSTETRICS & GYNECOLOGY

## 2018-10-31 PROCEDURE — 85730 THROMBOPLASTIN TIME PARTIAL: CPT | Performed by: OBSTETRICS & GYNECOLOGY

## 2018-10-31 PROCEDURE — 99207 ZZC PRENATAL VISIT: CPT | Performed by: OBSTETRICS & GYNECOLOGY

## 2018-10-31 PROCEDURE — 85027 COMPLETE CBC AUTOMATED: CPT | Performed by: OBSTETRICS & GYNECOLOGY

## 2018-10-31 PROCEDURE — 85610 PROTHROMBIN TIME: CPT | Performed by: OBSTETRICS & GYNECOLOGY

## 2018-10-31 PROCEDURE — 36415 COLL VENOUS BLD VENIPUNCTURE: CPT | Performed by: OBSTETRICS & GYNECOLOGY
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
## 2018-11-20 ENCOUNTER — PRENATAL OFFICE VISIT (OUTPATIENT)
Dept: OBGYN | Facility: CLINIC | Age: 33
End: 2018-11-20
Payer: COMMERCIAL

## 2018-11-20 VITALS
DIASTOLIC BLOOD PRESSURE: 68 MMHG | WEIGHT: 191.4 LBS | SYSTOLIC BLOOD PRESSURE: 115 MMHG | BODY MASS INDEX: 31.85 KG/M2 | HEART RATE: 100 BPM

## 2018-11-20 DIAGNOSIS — Z34.80 ENCOUNTER FOR SUPERVISION OF OTHER NORMAL PREGNANCY, UNSPECIFIED TRIMESTER: ICD-10-CM

## 2018-11-20 PROCEDURE — 99207 ZZC PRENATAL VISIT: CPT | Performed by: OBSTETRICS & GYNECOLOGY

## 2018-11-20 NOTE — MR AVS SNAPSHOT
After Visit Summary   11/20/2018    Awilda Campos    MRN: [id number]           Patient Information     Date Of Birth          1985        Visit Information        Provider Department      11/20/2018 4:15 PM Kim Giraldo MD [facility]        Today's Diagnoses     Encounter for supervision of other normal pregnancy, unspecified trimester           Follow-ups after your visit        Your next 10 appointments already scheduled     Dec 04, 2018  4:15 PM CST   ESTABLISHED PRENATAL with Kim Giraldo MD   [facility] ([facility])    [address] [place] [address] [phone]            Dec 20, 2018  4:15 PM CST   ESTABLISHED PRENATAL with Kim Giraldo MD   [facility] ([facility])    [address] [place] [address] [phone]              Who to contact     If you have questions or need follow up information about today's clinic visit or your schedule please contact [facility] directly at [phone].  Normal or non-critical lab and imaging results will be communicated to you by Biomemehart, letter or phone within 4 business days after the clinic has received the results. If you do not hear from us within 7 days, please contact the clinic through PassportParkingt or phone. If you have a critical or abnormal lab result, we will notify you by phone as soon as possible.  Submit refill requests through Desktime or call your pharmacy and they will forward the refill request to us. Please allow 3 business days for your refill to be completed.          Additional Information About Your Visit        MyChart Information     Desktime gives you secure access to your electronic health record. If you see a primary care provider, you can also send messages to your care team and make appointments. If you have questions, please call your primary care clinic.  If you  do not have a primary care provider, please call [phone] and they will assist you.        Care EveryWhere ID     This is your Care EveryWhere ID. This could be used by other organizations to access your [place] medical records  [id number]        Your Vitals Were     Pulse Last Period BMI (Body Mass Index)             100 04/10/2018 31.85 kg/m2          Blood Pressure from Last 3 Encounters:   11/20/18 115/68   10/31/18 115/66   10/30/18 110/59    Weight from Last 3 Encounters:   11/20/18 191 lb 6.4 oz (86.8 kg)   10/31/18 189 lb 4.8 oz (85.9 kg)   10/30/18 180 lb (81.6 kg)              Today, you had the following     No orders found for display       Primary Care Provider Fax #    Physician No Ref-Primary [phone]       No address on file        Equal Access to Services     [facility] : Hadii jose antonio strickland Sosanjeev, waaxda luqadaha, qaybta kaalmada adetieshada, nathaniel martinez . So [facility] [phone].    ATENCIÓN: Si habla español, tiene a dobson disposición servicios gratuitos de asistencia lingüística. Llame al [phone].    We comply with applicable federal civil rights laws and [place] laws. We do not discriminate on the basis of race, color, national origin, age, disability, sex, sexual orientation, or gender identity.            Thank you!     Thank you for choosing [facility]  for your care. Our goal is always to provide you with excellent care. Hearing back from our patients is one way we can continue to improve our services. Please take a few minutes to complete the written survey that you may receive in the mail after your visit with us. Thank you!             Your Updated Medication List - Protect others around you: Learn how to safely use, store and throw away your medicines at www.disposemymeds.org.          This list is accurate as of 11/20/18  5:12 PM.  Always use your most recent med list.                   Brand Name Dispense Instructions for  use Diagnosis    albuterol 108 (90 Base) MCG/ACT inhaler    PROAIR HFA/PROVENTIL HFA/VENTOLIN HFA    1 Inhaler    Inhale 2 puffs into the lungs every 6 hours as needed for shortness of breath / dyspnea or wheezing    Mild intermittent asthma without complication       levothyroxine sodium 88 MCG capsule    TIROSINT    90 capsule    Take 88 mcg by mouth daily    Hashimoto's thyroiditis       liothyronine 5 MCG tablet    CYTOMEL    90 tablet    Take 1 tablet (5 mcg) by mouth daily    Hashimoto's thyroiditis       PRENATAL + DHA PO           VITAMIN D-3 PO

## 2018-11-20 NOTE — PROGRESS NOTES
32w3d feeling ok, more LBP and pelvic discomfort.  PT exercises that she was given still help, but not as much as previously.  I encouraged her to retry the support belt since it has been a while.  Good fm.  RTC 2 weeks  lawson

## 2018-12-07 ENCOUNTER — PRENATAL OFFICE VISIT (OUTPATIENT)
Dept: OBGYN | Facility: CLINIC | Age: 33
End: 2018-12-07
Payer: COMMERCIAL

## 2018-12-07 VITALS
DIASTOLIC BLOOD PRESSURE: 69 MMHG | HEART RATE: 86 BPM | BODY MASS INDEX: 32.62 KG/M2 | SYSTOLIC BLOOD PRESSURE: 132 MMHG | WEIGHT: 196 LBS

## 2018-12-07 DIAGNOSIS — Z34.80 ENCOUNTER FOR SUPERVISION OF OTHER NORMAL PREGNANCY, UNSPECIFIED TRIMESTER: ICD-10-CM

## 2018-12-07 PROCEDURE — 99207 ZZC PRENATAL VISIT: CPT | Performed by: OBSTETRICS & GYNECOLOGY

## 2018-12-07 NOTE — PROGRESS NOTES
34w6d  Baby active; other than pelvic (round ligament) discomfort with movement and GERD (heartburn) no issues.  Discussed support garment (recommend) and liquid antiacid, eat more earlier in day, and if desired, prilosec OTC.  Probably will ask for epidural.  RTC weekly.  LT

## 2018-12-07 NOTE — MR AVS SNAPSHOT
After Visit Summary   12/7/2018    Awilda Campos    MRN: [id number]           Patient Information     Date Of Birth          1985        Visit Information        Provider Department      12/7/2018 11:45 AM Bryanna Gore MD [facility]        Today's Diagnoses     Encounter for supervision of other normal pregnancy, third trimester           Follow-ups after your visit        Follow-up notes from your care team     Return in about 1 week (around 12/14/2018).      Your next 10 appointments already scheduled     Dec 14, 2018  2:15 PM CST   ESTABLISHED PRENATAL with Kimberly Harrell MD   [facility] ([facility])    [address] [place] [address] [address]  [facility] [address]   [phone]            Dec 20, 2018  4:15 PM CST   ESTABLISHED PRENATAL with Kim Giraldo MD   [facility] ([facility])    [address] [place] [address] [address]  [facility] [address]   [phone]            Dec 27, 2018 11:45 AM CST   ESTABLISHED PRENATAL with Bryanna Gore MD   [facility] ([facility])    [address] [place] [address] [address]  [facility] [address]   [phone]            Jan 04, 2019  4:00 PM CST   ESTABLISHED PRENATAL with Casandra Sanchez MD   [facility] ([facility])    [address] [place] [address] [address]  [facility] [address]   [phone]            Damon 10, 2019  4:15 PM CST   ESTABLISHED PRENATAL with Bryanna Gore MD   [facility] ([facility])    [address] [place] [address]   [phone]            Jan 16, 2019 10:00 AM CST   ESTABLISHED PRENATAL with Kim Giraldo MD   [facility] ([facility])    [address] [place] [address] [address]  [facility] [address]   [phone]              Who to contact     If you have questions or need follow up information  about today's clinic visit or your schedule please contact [facility] [place] [place] directly at [phone].  Normal or non-critical lab and imaging results will be communicated to you by MyChart, letter or phone within 4 business days after the clinic has received the results. If you do not hear from us within 7 days, please contact the clinic through CodaMationhart or phone. If you have a critical or abnormal lab result, we will notify you by phone as soon as possible.  Submit refill requests through [employer] or call your pharmacy and they will forward the refill request to us. Please allow 3 business days for your refill to be completed.          Additional Information About Your Visit        CodaMationhart Information     [employer] gives you secure access to your electronic health record. If you see a primary care provider, you can also send messages to your care team and make appointments. If you have questions, please call your primary care clinic.  If you do not have a primary care provider, please call [phone] and they will assist you.        Care EveryWhere ID     This is your Care EveryWhere ID. This could be used by other organizations to access your [place] medical records  [id number]        Your Vitals Were     Pulse Last Period BMI (Body Mass Index)             86 04/10/2018 32.62 kg/m2          Blood Pressure from Last 3 Encounters:   12/07/18 132/69   11/20/18 115/68   10/31/18 115/66    Weight from Last 3 Encounters:   12/07/18 196 lb (88.9 kg)   11/20/18 191 lb 6.4 oz (86.8 kg)   10/31/18 189 lb 4.8 oz (85.9 kg)              Today, you had the following     No orders found for display       Primary Care Provider Fax #    Physician No Ref-Primary [phone]       No address on file        Equal Access to Services     MIRTHA JACKSON : Ana Luisa Sampson, camila brooks, qaybta luis hickman, nathaniel guardado. So [facility] [phone].    ATENCIÓN: Olivia orellana,  tiene a dobson disposición servicios gratuitos de asistencia lingüística. Amy oreilly [phone].    We comply with applicable federal civil rights laws and [place] laws. We do not discriminate on the basis of race, color, national origin, age, disability, sex, sexual orientation, or gender identity.            Thank you!     Thank you for choosing [facility]  for your care. Our goal is always to provide you with excellent care. Hearing back from our patients is one way we can continue to improve our services. Please take a few minutes to complete the written survey that you may receive in the mail after your visit with us. Thank you!             Your Updated Medication List - Protect others around you: Learn how to safely use, store and throw away your medicines at www.disposemymeds.org.          This list is accurate as of 12/7/18 12:07 PM.  Always use your most recent med list.                   Brand Name Dispense Instructions for use Diagnosis    albuterol 108 (90 Base) MCG/ACT inhaler    PROAIR HFA/PROVENTIL HFA/VENTOLIN HFA    1 Inhaler    Inhale 2 puffs into the lungs every 6 hours as needed for shortness of breath / dyspnea or wheezing    Mild intermittent asthma without complication       levothyroxine 88 MCG capsule    TIROSINT    90 capsule    Take 88 mcg by mouth daily    Hashimoto's thyroiditis       liothyronine 5 MCG tablet    CYTOMEL    90 tablet    Take 1 tablet (5 mcg) by mouth daily    Hashimoto's thyroiditis       PRENATAL + DHA PO           VITAMIN D-3 PO

## 2018-12-14 ENCOUNTER — PRENATAL OFFICE VISIT (OUTPATIENT)
Dept: OBGYN | Facility: CLINIC | Age: 33
End: 2018-12-14
Payer: COMMERCIAL

## 2018-12-14 VITALS
HEIGHT: 65 IN | SYSTOLIC BLOOD PRESSURE: 113 MMHG | OXYGEN SATURATION: 98 % | BODY MASS INDEX: 32.37 KG/M2 | DIASTOLIC BLOOD PRESSURE: 70 MMHG | WEIGHT: 194.3 LBS | HEART RATE: 95 BPM

## 2018-12-14 DIAGNOSIS — Z34.80 ENCOUNTER FOR SUPERVISION OF OTHER NORMAL PREGNANCY, UNSPECIFIED TRIMESTER: ICD-10-CM

## 2018-12-14 LAB — HGB BLD-MCNC: 12.3 G/DL (ref 11.7–15.7)

## 2018-12-14 PROCEDURE — 99207 ZZC PRENATAL VISIT: CPT | Performed by: OBSTETRICS & GYNECOLOGY

## 2018-12-14 PROCEDURE — 87653 STREP B DNA AMP PROBE: CPT | Performed by: OBSTETRICS & GYNECOLOGY

## 2018-12-14 PROCEDURE — 36416 COLLJ CAPILLARY BLOOD SPEC: CPT | Performed by: OBSTETRICS & GYNECOLOGY

## 2018-12-14 PROCEDURE — 00000218 ZZHCL STATISTIC OBHBG - HEMOGLOBIN: Performed by: OBSTETRICS & GYNECOLOGY

## 2018-12-14 ASSESSMENT — ANXIETY QUESTIONNAIRES
7. FEELING AFRAID AS IF SOMETHING AWFUL MIGHT HAPPEN: NOT AT ALL
5. BEING SO RESTLESS THAT IT IS HARD TO SIT STILL: NOT AT ALL
3. WORRYING TOO MUCH ABOUT DIFFERENT THINGS: NOT AT ALL
1. FEELING NERVOUS, ANXIOUS, OR ON EDGE: NOT AT ALL
2. NOT BEING ABLE TO STOP OR CONTROL WORRYING: NOT AT ALL
6. BECOMING EASILY ANNOYED OR IRRITABLE: SEVERAL DAYS
GAD7 TOTAL SCORE: 2

## 2018-12-14 ASSESSMENT — PATIENT HEALTH QUESTIONNAIRE - PHQ9
5. POOR APPETITE OR OVEREATING: SEVERAL DAYS
SUM OF ALL RESPONSES TO PHQ QUESTIONS 1-9: 7

## 2018-12-14 ASSESSMENT — MIFFLIN-ST. JEOR: SCORE: 1592.22

## 2018-12-14 NOTE — PROGRESS NOTES
GBS today.  Having another boy. cx deferred. Has a rash at her bra line and will try OTC hydrocortisone cream. Also with some anal itching but nothing seen on exam. Discussed can try tucks pads and reassured no hemorrhoids were seen. RTC weekly until delivery.  BE

## 2018-12-15 LAB
GP B STREP DNA SPEC QL NAA+PROBE: NEGATIVE
SPECIMEN SOURCE: NORMAL

## 2018-12-15 ASSESSMENT — ANXIETY QUESTIONNAIRES: GAD7 TOTAL SCORE: 2

## 2018-12-20 ENCOUNTER — PRENATAL OFFICE VISIT (OUTPATIENT)
Dept: OBGYN | Facility: CLINIC | Age: 33
End: 2018-12-20
Payer: COMMERCIAL

## 2018-12-20 VITALS
DIASTOLIC BLOOD PRESSURE: 74 MMHG | HEART RATE: 99 BPM | WEIGHT: 199.1 LBS | SYSTOLIC BLOOD PRESSURE: 114 MMHG | TEMPERATURE: 99 F | BODY MASS INDEX: 33.13 KG/M2

## 2018-12-20 DIAGNOSIS — Z34.80 ENCOUNTER FOR SUPERVISION OF OTHER NORMAL PREGNANCY, UNSPECIFIED TRIMESTER: Primary | ICD-10-CM

## 2018-12-20 PROCEDURE — 99207 ZZC PRENATAL VISIT: CPT | Performed by: OBSTETRICS & GYNECOLOGY

## 2018-12-20 RX ORDER — AMOXICILLIN 250 MG
1 CAPSULE ORAL DAILY
Qty: 100 TABLET | Refills: 0 | Status: SHIPPED | OUTPATIENT
Start: 2018-12-20 | End: 2019-03-07

## 2018-12-20 NOTE — PROGRESS NOTES
36w5d feeling good, no c/o.  Good fm.  No ctx that she can tell.  GBS neg.  Has ibuprofen and tylenol at home, will send pp rx for senna.   Labor instructions and kick counts reviewed. RTC weekly  lawson

## 2018-12-27 ENCOUNTER — PRENATAL OFFICE VISIT (OUTPATIENT)
Dept: OBGYN | Facility: CLINIC | Age: 33
End: 2018-12-27
Payer: COMMERCIAL

## 2018-12-27 VITALS
DIASTOLIC BLOOD PRESSURE: 70 MMHG | WEIGHT: 199 LBS | SYSTOLIC BLOOD PRESSURE: 126 MMHG | HEART RATE: 94 BPM | BODY MASS INDEX: 33.12 KG/M2 | TEMPERATURE: 99.4 F

## 2018-12-27 DIAGNOSIS — Z34.80 ENCOUNTER FOR SUPERVISION OF OTHER NORMAL PREGNANCY, UNSPECIFIED TRIMESTER: ICD-10-CM

## 2018-12-27 PROCEDURE — 99207 ZZC PRENATAL VISIT: CPT | Performed by: OBSTETRICS & GYNECOLOGY

## 2018-12-27 RX ORDER — ONDANSETRON 2 MG/ML
4 INJECTION INTRAMUSCULAR; INTRAVENOUS EVERY 6 HOURS PRN
Status: CANCELLED | OUTPATIENT
Start: 2018-12-27

## 2018-12-27 RX ORDER — IBUPROFEN 200 MG
800 TABLET ORAL
Status: CANCELLED | OUTPATIENT
Start: 2018-12-27

## 2018-12-27 RX ORDER — OXYCODONE AND ACETAMINOPHEN 5; 325 MG/1; MG/1
1 TABLET ORAL
Status: CANCELLED | OUTPATIENT
Start: 2018-12-27

## 2018-12-27 RX ORDER — CARBOPROST TROMETHAMINE 250 UG/ML
250 INJECTION, SOLUTION INTRAMUSCULAR
Status: CANCELLED | OUTPATIENT
Start: 2018-12-27

## 2018-12-27 RX ORDER — NALOXONE HYDROCHLORIDE 0.4 MG/ML
.1-.4 INJECTION, SOLUTION INTRAMUSCULAR; INTRAVENOUS; SUBCUTANEOUS
Status: CANCELLED | OUTPATIENT
Start: 2018-12-27

## 2018-12-27 RX ORDER — LIDOCAINE 40 MG/G
CREAM TOPICAL
Status: CANCELLED | OUTPATIENT
Start: 2018-12-27

## 2018-12-27 RX ORDER — ACETAMINOPHEN 325 MG/1
650 TABLET ORAL EVERY 4 HOURS PRN
Status: CANCELLED | OUTPATIENT
Start: 2018-12-27

## 2018-12-27 RX ORDER — FENTANYL CITRATE 50 UG/ML
50-100 INJECTION, SOLUTION INTRAMUSCULAR; INTRAVENOUS
Status: CANCELLED | OUTPATIENT
Start: 2018-12-27

## 2018-12-27 RX ORDER — OXYTOCIN/0.9 % SODIUM CHLORIDE 30/500 ML
100-340 PLASTIC BAG, INJECTION (ML) INTRAVENOUS CONTINUOUS PRN
Status: CANCELLED | OUTPATIENT
Start: 2018-12-27

## 2018-12-27 RX ORDER — OXYTOCIN 10 [USP'U]/ML
10 INJECTION, SOLUTION INTRAMUSCULAR; INTRAVENOUS
Status: CANCELLED | OUTPATIENT
Start: 2018-12-27

## 2018-12-27 RX ORDER — SODIUM CHLORIDE, SODIUM LACTATE, POTASSIUM CHLORIDE, CALCIUM CHLORIDE 600; 310; 30; 20 MG/100ML; MG/100ML; MG/100ML; MG/100ML
INJECTION, SOLUTION INTRAVENOUS CONTINUOUS
Status: CANCELLED | OUTPATIENT
Start: 2018-12-27

## 2018-12-27 RX ORDER — BREAST PUMP
1 EACH MISCELLANEOUS ONCE
Qty: 1 EACH | Refills: 0 | Status: SHIPPED | OUTPATIENT
Start: 2018-12-27 | End: 2019-03-07

## 2018-12-27 RX ORDER — METHYLERGONOVINE MALEATE 0.2 MG/ML
200 INJECTION INTRAVENOUS
Status: CANCELLED | OUTPATIENT
Start: 2018-12-27

## 2018-12-27 ASSESSMENT — PATIENT HEALTH QUESTIONNAIRE - PHQ9: SUM OF ALL RESPONSES TO PHQ QUESTIONS 1-9: 4

## 2018-12-27 NOTE — PROGRESS NOTES
37w5d  Active fetal movement. Irregular contractions. No leaking, bleeding or abnormal discharge.     GBS: Negative  Hemoglobin   Date Value Ref Range Status   12/14/2018 12.3 11.7 - 15.7 g/dL Final     Breast pump rx: Done  Labor orders: Done  Birth plan: epidural  Length of stay: discussed   Disability paperwork: completed  Resident involvement: discussed and agrees.    Reviewed labor instructions, kick counts, s/sx pre-eclampsia.  RTC weekly.  Zhane Coyne MD

## 2019-01-04 ENCOUNTER — PRENATAL OFFICE VISIT (OUTPATIENT)
Dept: OBGYN | Facility: CLINIC | Age: 34
End: 2019-01-04
Payer: COMMERCIAL

## 2019-01-04 VITALS
DIASTOLIC BLOOD PRESSURE: 81 MMHG | SYSTOLIC BLOOD PRESSURE: 127 MMHG | HEART RATE: 97 BPM | BODY MASS INDEX: 33.61 KG/M2 | WEIGHT: 202 LBS | OXYGEN SATURATION: 96 %

## 2019-01-04 DIAGNOSIS — Z34.80 ENCOUNTER FOR SUPERVISION OF OTHER NORMAL PREGNANCY, UNSPECIFIED TRIMESTER: ICD-10-CM

## 2019-01-04 PROCEDURE — 99207 ZZC PRENATAL VISIT: CPT | Performed by: OBSTETRICS & GYNECOLOGY

## 2019-01-10 ENCOUNTER — PRENATAL OFFICE VISIT (OUTPATIENT)
Dept: OBGYN | Facility: CLINIC | Age: 34
End: 2019-01-10
Payer: COMMERCIAL

## 2019-01-10 VITALS
DIASTOLIC BLOOD PRESSURE: 80 MMHG | SYSTOLIC BLOOD PRESSURE: 133 MMHG | BODY MASS INDEX: 33.45 KG/M2 | HEART RATE: 104 BPM | WEIGHT: 201 LBS | TEMPERATURE: 98.7 F

## 2019-01-10 DIAGNOSIS — Z34.80 ENCOUNTER FOR SUPERVISION OF OTHER NORMAL PREGNANCY, UNSPECIFIED TRIMESTER: Primary | ICD-10-CM

## 2019-01-10 PROCEDURE — 99207 ZZC PRENATAL VISIT: CPT | Performed by: OBSTETRICS & GYNECOLOGY

## 2019-01-10 NOTE — PROGRESS NOTES
Has appt next week just in case. Feeling about same as last baby to mom--9 lbs. cx today 1/20/-2 and posterior. Painful to check so will not check if pregnant next week. Looking at prior baby records, had been closed at 40+3 wk visit.  Discussed IOL vs NST/YUNIOR at 41 wks and they can decide if undelivered next week. [place] two skipped beats and listened to doptones for full 1-2 min. Discussed PAC and not worried. Kick counts discussed. BE

## 2019-01-16 ENCOUNTER — PRENATAL OFFICE VISIT (OUTPATIENT)
Dept: OBGYN | Facility: CLINIC | Age: 34
End: 2019-01-16
Payer: COMMERCIAL

## 2019-01-16 ENCOUNTER — ANESTHESIA (OUTPATIENT)
Dept: OBGYN | Facility: CLINIC | Age: 34
End: 2019-01-16
Payer: COMMERCIAL

## 2019-01-16 ENCOUNTER — HOSPITAL ENCOUNTER (INPATIENT)
Facility: CLINIC | Age: 34
LOS: 3 days | Discharge: HOME | End: 2019-01-19
Attending: OBSTETRICS & GYNECOLOGY | Admitting: HOSPITALIST
Payer: COMMERCIAL

## 2019-01-16 ENCOUNTER — ANESTHESIA EVENT (OUTPATIENT)
Dept: OBGYN | Facility: CLINIC | Age: 34
End: 2019-01-16
Payer: COMMERCIAL

## 2019-01-16 VITALS
BODY MASS INDEX: 33.23 KG/M2 | HEART RATE: 105 BPM | WEIGHT: 199.7 LBS | DIASTOLIC BLOOD PRESSURE: 77 MMHG | SYSTOLIC BLOOD PRESSURE: 141 MMHG | TEMPERATURE: 98.5 F

## 2019-01-16 DIAGNOSIS — Z34.80 ENCOUNTER FOR SUPERVISION OF OTHER NORMAL PREGNANCY, UNSPECIFIED TRIMESTER: ICD-10-CM

## 2019-01-16 PROBLEM — Z34.90 TERM PREGNANCY: Status: ACTIVE | Noted: 2019-01-16

## 2019-01-16 LAB
ABO + RH BLD: NORMAL
ABO + RH BLD: NORMAL
ALT SERPL W P-5'-P-CCNC: 13 U/L (ref 0–50)
AST SERPL W P-5'-P-CCNC: 18 U/L (ref 0–45)
BASOPHILS # BLD AUTO: 0 10E9/L (ref 0–0.2)
BASOPHILS NFR BLD AUTO: 0.1 %
BLD GP AB SCN SERPL QL: NORMAL
BLOOD BANK CMNT PATIENT-IMP: NORMAL
CREAT SERPL-MCNC: 0.53 MG/DL (ref 0.52–1.04)
CREAT UR-MCNC: 127 MG/DL
DIFFERENTIAL METHOD BLD: ABNORMAL
EOSINOPHIL # BLD AUTO: 0.1 10E9/L (ref 0–0.7)
EOSINOPHIL NFR BLD AUTO: 1.4 %
ERYTHROCYTE [DISTWIDTH] IN BLOOD BY AUTOMATED COUNT: 15.5 % (ref 10–15)
ERYTHROCYTE [DISTWIDTH] IN BLOOD BY AUTOMATED COUNT: 15.5 % (ref 10–15)
GFR SERPL CREATININE-BSD FRML MDRD: >90 ML/MIN/{1.73_M2}
HCT VFR BLD AUTO: 35.8 % (ref 35–47)
HCT VFR BLD AUTO: 36.1 % (ref 35–47)
HGB BLD-MCNC: 11.8 G/DL (ref 11.7–15.7)
HGB BLD-MCNC: 11.8 G/DL (ref 11.7–15.7)
IMM GRANULOCYTES # BLD: 0.1 10E9/L (ref 0–0.4)
IMM GRANULOCYTES NFR BLD: 0.6 %
LYMPHOCYTES # BLD AUTO: 1.2 10E9/L (ref 0.8–5.3)
LYMPHOCYTES NFR BLD AUTO: 14.5 %
MCH RBC QN AUTO: 30.6 PG (ref 26.5–33)
MCH RBC QN AUTO: 30.6 PG (ref 26.5–33)
MCHC RBC AUTO-ENTMCNC: 32.7 G/DL (ref 31.5–36.5)
MCHC RBC AUTO-ENTMCNC: 33 G/DL (ref 31.5–36.5)
MCV RBC AUTO: 93 FL (ref 78–100)
MCV RBC AUTO: 94 FL (ref 78–100)
MONOCYTES # BLD AUTO: 0.5 10E9/L (ref 0–1.3)
MONOCYTES NFR BLD AUTO: 6 %
NEUTROPHILS # BLD AUTO: 6.5 10E9/L (ref 1.6–8.3)
NEUTROPHILS NFR BLD AUTO: 77.4 %
NRBC # BLD AUTO: 0 10*3/UL
NRBC BLD AUTO-RTO: 0 /100
PLATELET # BLD AUTO: 219 10E9/L (ref 150–450)
PLATELET # BLD AUTO: 222 10E9/L (ref 150–450)
PROT UR-MCNC: 0.31 G/L
PROT/CREAT 24H UR: 0.25 G/G CR (ref 0–0.2)
RBC # BLD AUTO: 3.85 10E12/L (ref 3.8–5.2)
RBC # BLD AUTO: 3.86 10E12/L (ref 3.8–5.2)
SPECIMEN EXP DATE BLD: NORMAL
WBC # BLD AUTO: 8 10E9/L (ref 4–11)
WBC # BLD AUTO: 8.5 10E9/L (ref 4–11)

## 2019-01-16 PROCEDURE — 25000132 ZZH RX MED GY IP 250 OP 250 PS 637: Performed by: STUDENT IN AN ORGANIZED HEALTH CARE EDUCATION/TRAINING PROGRAM

## 2019-01-16 PROCEDURE — 99221 1ST HOSP IP/OBS SF/LOW 40: CPT | Mod: 25 | Performed by: OBSTETRICS & GYNECOLOGY

## 2019-01-16 PROCEDURE — 59025 FETAL NON-STRESS TEST: CPT | Mod: 26 | Performed by: OBSTETRICS & GYNECOLOGY

## 2019-01-16 PROCEDURE — 85025 COMPLETE CBC W/AUTO DIFF WBC: CPT | Performed by: STUDENT IN AN ORGANIZED HEALTH CARE EDUCATION/TRAINING PROGRAM

## 2019-01-16 PROCEDURE — 59200 INSERT CERVICAL DILATOR: CPT | Mod: GC | Performed by: OBSTETRICS & GYNECOLOGY

## 2019-01-16 PROCEDURE — 36415 COLL VENOUS BLD VENIPUNCTURE: CPT | Performed by: STUDENT IN AN ORGANIZED HEALTH CARE EDUCATION/TRAINING PROGRAM

## 2019-01-16 PROCEDURE — 85027 COMPLETE CBC AUTOMATED: CPT | Performed by: STUDENT IN AN ORGANIZED HEALTH CARE EDUCATION/TRAINING PROGRAM

## 2019-01-16 PROCEDURE — 3E0P7VZ INTRODUCTION OF HORMONE INTO FEMALE REPRODUCTIVE, VIA NATURAL OR ARTIFICIAL OPENING: ICD-10-PCS | Performed by: OBSTETRICS & GYNECOLOGY

## 2019-01-16 PROCEDURE — 86850 RBC ANTIBODY SCREEN: CPT | Performed by: STUDENT IN AN ORGANIZED HEALTH CARE EDUCATION/TRAINING PROGRAM

## 2019-01-16 PROCEDURE — 86780 TREPONEMA PALLIDUM: CPT | Performed by: STUDENT IN AN ORGANIZED HEALTH CARE EDUCATION/TRAINING PROGRAM

## 2019-01-16 PROCEDURE — 86901 BLOOD TYPING SEROLOGIC RH(D): CPT | Performed by: STUDENT IN AN ORGANIZED HEALTH CARE EDUCATION/TRAINING PROGRAM

## 2019-01-16 PROCEDURE — 99207 ZZC PRENATAL VISIT: CPT | Performed by: OBSTETRICS & GYNECOLOGY

## 2019-01-16 PROCEDURE — 82565 ASSAY OF CREATININE: CPT | Performed by: STUDENT IN AN ORGANIZED HEALTH CARE EDUCATION/TRAINING PROGRAM

## 2019-01-16 PROCEDURE — 84460 ALANINE AMINO (ALT) (SGPT): CPT | Performed by: STUDENT IN AN ORGANIZED HEALTH CARE EDUCATION/TRAINING PROGRAM

## 2019-01-16 PROCEDURE — 84156 ASSAY OF PROTEIN URINE: CPT | Performed by: STUDENT IN AN ORGANIZED HEALTH CARE EDUCATION/TRAINING PROGRAM

## 2019-01-16 PROCEDURE — 86900 BLOOD TYPING SEROLOGIC ABO: CPT | Performed by: STUDENT IN AN ORGANIZED HEALTH CARE EDUCATION/TRAINING PROGRAM

## 2019-01-16 PROCEDURE — 3E033VJ INTRODUCTION OF OTHER HORMONE INTO PERIPHERAL VEIN, PERCUTANEOUS APPROACH: ICD-10-PCS | Performed by: OBSTETRICS & GYNECOLOGY

## 2019-01-16 PROCEDURE — 84450 TRANSFERASE (AST) (SGOT): CPT | Performed by: STUDENT IN AN ORGANIZED HEALTH CARE EDUCATION/TRAINING PROGRAM

## 2019-01-16 PROCEDURE — 12000001 ZZH R&B MED SURG/OB UMMC

## 2019-01-16 PROCEDURE — 10907ZC DRAINAGE OF AMNIOTIC FLUID, THERAPEUTIC FROM PRODUCTS OF CONCEPTION, VIA NATURAL OR ARTIFICIAL OPENING: ICD-10-PCS | Performed by: OBSTETRICS & GYNECOLOGY

## 2019-01-16 RX ORDER — LIOTHYRONINE SODIUM 5 UG/1
5 TABLET ORAL DAILY
Status: DISCONTINUED | OUTPATIENT
Start: 2019-01-16 | End: 2019-01-19 | Stop reason: HOSPADM

## 2019-01-16 RX ORDER — ACETAMINOPHEN 325 MG/1
650 TABLET ORAL EVERY 4 HOURS PRN
Status: DISCONTINUED | OUTPATIENT
Start: 2019-01-16 | End: 2019-01-18

## 2019-01-16 RX ORDER — OXYTOCIN/0.9 % SODIUM CHLORIDE 30/500 ML
100-340 PLASTIC BAG, INJECTION (ML) INTRAVENOUS CONTINUOUS PRN
Status: DISCONTINUED | OUTPATIENT
Start: 2019-01-16 | End: 2019-01-18

## 2019-01-16 RX ORDER — NALOXONE HYDROCHLORIDE 0.4 MG/ML
.1-.4 INJECTION, SOLUTION INTRAMUSCULAR; INTRAVENOUS; SUBCUTANEOUS
Status: DISCONTINUED | OUTPATIENT
Start: 2019-01-16 | End: 2019-01-18

## 2019-01-16 RX ORDER — ONDANSETRON 2 MG/ML
4 INJECTION INTRAMUSCULAR; INTRAVENOUS EVERY 6 HOURS PRN
Status: DISCONTINUED | OUTPATIENT
Start: 2019-01-16 | End: 2019-01-18

## 2019-01-16 RX ORDER — METHYLERGONOVINE MALEATE 0.2 MG/ML
200 INJECTION INTRAVENOUS
Status: DISCONTINUED | OUTPATIENT
Start: 2019-01-16 | End: 2019-01-18

## 2019-01-16 RX ORDER — LEVOTHYROXINE SODIUM 88 UG/1
88 TABLET ORAL DAILY
Status: DISCONTINUED | OUTPATIENT
Start: 2019-01-16 | End: 2019-01-19 | Stop reason: HOSPADM

## 2019-01-16 RX ORDER — ALBUTEROL SULFATE 90 UG/1
2 AEROSOL, METERED RESPIRATORY (INHALATION) EVERY 6 HOURS PRN
Status: DISCONTINUED | OUTPATIENT
Start: 2019-01-16 | End: 2019-01-18

## 2019-01-16 RX ORDER — HYDROXYZINE HYDROCHLORIDE 25 MG/1
100 TABLET, FILM COATED ORAL
Status: DISCONTINUED | OUTPATIENT
Start: 2019-01-16 | End: 2019-01-18

## 2019-01-16 RX ORDER — FENTANYL CITRATE 50 UG/ML
50-100 INJECTION, SOLUTION INTRAMUSCULAR; INTRAVENOUS
Status: DISCONTINUED | OUTPATIENT
Start: 2019-01-16 | End: 2019-01-18

## 2019-01-16 RX ORDER — MISOPROSTOL 100 UG/1
25 TABLET ORAL EVERY 4 HOURS PRN
Status: DISCONTINUED | OUTPATIENT
Start: 2019-01-16 | End: 2019-01-18

## 2019-01-16 RX ORDER — SODIUM CHLORIDE, SODIUM LACTATE, POTASSIUM CHLORIDE, CALCIUM CHLORIDE 600; 310; 30; 20 MG/100ML; MG/100ML; MG/100ML; MG/100ML
INJECTION, SOLUTION INTRAVENOUS CONTINUOUS
Status: DISCONTINUED | OUTPATIENT
Start: 2019-01-16 | End: 2019-01-18

## 2019-01-16 RX ORDER — OXYTOCIN 10 [USP'U]/ML
10 INJECTION, SOLUTION INTRAMUSCULAR; INTRAVENOUS
Status: DISCONTINUED | OUTPATIENT
Start: 2019-01-16 | End: 2019-01-18

## 2019-01-16 RX ORDER — OXYCODONE AND ACETAMINOPHEN 5; 325 MG/1; MG/1
1 TABLET ORAL
Status: DISCONTINUED | OUTPATIENT
Start: 2019-01-16 | End: 2019-01-18

## 2019-01-16 RX ORDER — CITRIC ACID/SODIUM CITRATE 334-500MG
30 SOLUTION, ORAL ORAL ONCE
Status: DISCONTINUED | OUTPATIENT
Start: 2019-01-16 | End: 2019-01-18

## 2019-01-16 RX ORDER — IBUPROFEN 800 MG/1
800 TABLET, FILM COATED ORAL
Status: COMPLETED | OUTPATIENT
Start: 2019-01-16 | End: 2019-01-18

## 2019-01-16 RX ORDER — LIDOCAINE 40 MG/G
CREAM TOPICAL
Status: DISCONTINUED | OUTPATIENT
Start: 2019-01-16 | End: 2019-01-18

## 2019-01-16 RX ORDER — CARBOPROST TROMETHAMINE 250 UG/ML
250 INJECTION, SOLUTION INTRAMUSCULAR
Status: DISCONTINUED | OUTPATIENT
Start: 2019-01-16 | End: 2019-01-18

## 2019-01-16 RX ADMIN — HYDROXYZINE HYDROCHLORIDE 100 MG: 50 TABLET ORAL at 20:37

## 2019-01-16 RX ADMIN — Medication 25 MCG: at 15:56

## 2019-01-16 RX ADMIN — Medication 25 MCG: at 20:12

## 2019-01-16 ASSESSMENT — ACTIVITIES OF DAILY LIVING (ADL)
RETIRED_COMMUNICATION: 0-->UNDERSTANDS/COMMUNICATES WITHOUT DIFFICULTY
COGNITION: 0 - NO COGNITION ISSUES REPORTED
SWALLOWING: 0-->SWALLOWS FOODS/LIQUIDS WITHOUT DIFFICULTY
DRESS: 0-->INDEPENDENT
TRANSFERRING: 0-->INDEPENDENT
TOILETING: 0-->INDEPENDENT
AMBULATION: 0-->INDEPENDENT
BATHING: 0-->INDEPENDENT
RETIRED_EATING: 0-->INDEPENDENT
FALL_HISTORY_WITHIN_LAST_SIX_MONTHS: NO

## 2019-01-16 ASSESSMENT — ANXIETY QUESTIONNAIRES
5. BEING SO RESTLESS THAT IT IS HARD TO SIT STILL: NOT AT ALL
6. BECOMING EASILY ANNOYED OR IRRITABLE: SEVERAL DAYS
1. FEELING NERVOUS, ANXIOUS, OR ON EDGE: SEVERAL DAYS
GAD7 TOTAL SCORE: 3
3. WORRYING TOO MUCH ABOUT DIFFERENT THINGS: NOT AT ALL
2. NOT BEING ABLE TO STOP OR CONTROL WORRYING: NOT AT ALL
7. FEELING AFRAID AS IF SOMETHING AWFUL MIGHT HAPPEN: NOT AT ALL

## 2019-01-16 ASSESSMENT — PATIENT HEALTH QUESTIONNAIRE - PHQ9
SUM OF ALL RESPONSES TO PHQ QUESTIONS 1-9: 4
5. POOR APPETITE OR OVEREATING: SEVERAL DAYS

## 2019-01-16 NOTE — PLAN OF CARE
Data: [place] quiet, palpate mild and not felt by patient. , moderate variability, +accels, no decels. Denies fluid leak or vaginal bleeding. Denies blurred vision, headache, epigastric pain.   Interventions: Continuous uterine/fetal assessment. Vital Signs per order set.   Plan: Anticipate . Provide labor/coping assistance as needed by patient and support person. Observe for and notify care provider of indications of progressing labor, need for pain medications, or signs of fetal/maternal compromise. Patient plans for epidural when in labor.

## 2019-01-16 NOTE — PROGRESS NOTES
40w4d feeling ok, tired and ready to be done.  Some ctx but nothing regular or consistent.  Good fm.  She denies HA, scotomata, RUQ pain.  BP slightly elevated, 141/77, 142/82.  Will send to L&D for preeclampsia eval and possible IOL.  If BPs wnl and able to discharge home, will schedule IOL for sat 1/19.  Dr. Martin (on call) is aware.  lawson

## 2019-01-16 NOTE — H&P
L&D History and Physical   2019  Awilda Campos  [id number]      HPI: Awilda Campos is a 33 year old  at 40w4d by 10 week US who presented for induction of labor due to gestational hypertension.     She states that she is feeling well today. She endorses some lightheadedness upon standing. She denies headache, chest pain, shortness of breath, fever, chills, nausea, vomiting or other systemic complaints. She denies vaginal bleeding or loss of fluid and is feeling normal fetal movement. She has been having mild intermittent contractions for the past week.   Patient was seen in clinic today with elevated BP.  Sent to triage to further eval.      Her pregnancy has been complicated by:  - Placenta previa in 2nd trimester, resolved at 28 wks  - Hashimoto's thyroiditis, last TSH 2.75 (18), PTA levothyroxine 88 mcg and liothyronine 5 mcg  - Mild intermittent asthma, PTA albuterol inhaler  - placenta previa, now resolved    ROS: No headaches, nausea, vomiting, fevers, chills, chest pain, SOB, Denies abdominal pain, constipation, diarrhea, dysuria, no changes in vaginal discharge. Denies any RUQ pain. Mild edema in extremities noted.     OBHX:   Obstetric History       T1      L1     SAB1   TAB0   Ectopic0   Multiple0   Live Births1       # Outcome Date GA Lbr Ren/2nd Weight Sex Delivery Anes PTL Lv   3 Current            2 SAB 04/10/18 5w0d          1 Term 12/06/15 41w0d  4.082 kg (9 lb) M  EPI N CHEPE          Past Medical History:   Diagnosis Date     Asthma      Hashimoto's disease      Uncomplicated asthma     Uses an inhaler as needed       Past Surgical History:   Procedure Laterality Date     NO HISTORY OF SURGERY         Medications:     No current facility-administered medications on file prior to encounter.   Current Outpatient Medications on File Prior to Encounter:  ADVAIR DISKUS 250-50 MCG/DOSE IN MISC INHALE ONE PUFF TWICE DAILY   albuterol (PROAIR HFA/PROVENTIL HFA/VENTOLIN  HFA) 108 (90 Base) MCG/ACT Inhaler Inhale 2 puffs into the lungs every 6 hours as needed for shortness of breath / dyspnea or wheezing   Cholecalciferol (VITAMIN D-3 PO)    DIPROLENE AF 0.05 % EX CREA apply twice a day to rash areas   Levothyroxine Sodium 88 MCG CAPS Take 88 mcg by mouth daily   liothyronine (CYTOMEL) 5 MCG tablet Take 1 tablet (5 mcg) by mouth daily   PREDNISONE 20 MG OR TABS ONE TABLET IN THE MORNING WITH FOOD   Prenatal-FeFum-FA-DHA w/o A (PRENATAL + DHA PO)    PROAIR  (90 BASE) MCG/ACT IN AERS 2 puffs as needed q 4-6 hours prn shortness of breath   senna-docusate (SENOKOT-S/PERICOLACE) 8.6-50 MG tablet Take 1 tablet by mouth daily Start after delivery.       Allergies   Allergen Reactions     No Known Allergies        Family History   Problem Relation Age of Onset     Breast Cancer Mother      Hyperlipidemia Mother      Asthma Brother         sports induced asthma     Arthritis Paternal Grandmother      Diabetes Maternal Grandfather      Arthritis Father      Eye Disorder Maternal Grandmother         cataracts     Gastrointestinal Disease Other         self, hx of stomach issues     Heart Disease Paternal Grandfather         pace maker     Osteoporosis Paternal Grandmother        SocialHX:   Social History     Tobacco Use     Smoking status: Never Smoker     Smokeless tobacco: Never Used   Substance Use Topics     Alcohol use: No     Comment: very infrequently     Drug use: No       ROS: 10-point ROS negative except as indicated in HPI.    Physical Exam:  Vitals:    01/16/19 1405 01/16/19 1420 01/16/19 1435 01/16/19 1700   BP: 133/71 120/69 145/82 112/62   Resp:    18   Temp:    98.4  F (36.9  C)   TempSrc:    Oral     General: alert, oriented female, resting in bed in NAD  CV: regular rate and rhythm  Lungs: clear bilaterally, no crackles or wheezes. No increased work of breathing.   Abdomen: soft, gravid, non-tender, slight discomfort with palpation. EFW 8.5# by leopolds.  Extremities:  bilateral lower extremities non-tender with slight edema, 2+ patellar reflexes, no clonus bilaterally    SVE: closed/long/high  Membranes: Intact    Presentation: ceph by BSUS    FHT: baseline 140, moderate variability, +accelerations, no decelerations, NST is reactive  [place]: no contractions per toco      Prenatal Labs:   Lab Results   Component Value Date    ABO A 06/13/2018    RH Pos 06/13/2018    AS Neg 06/13/2018    HEPBANG Nonreactive 06/13/2018    HGB 12.3 12/14/2018       GBS Status:   Lab Results   Component Value Date    GBS Negative 12/14/2018       Labs:   Results for orders placed or performed during the hospital encounter of 01/16/19 (from the past 24 hour(s))   CBC with platelets   Result Value Ref Range    WBC 8.0 4.0 - 11.0 10e9/L    RBC Count 3.86 3.8 - 5.2 10e12/L    Hemoglobin 11.8 11.7 - 15.7 g/dL    Hematocrit 36.1 35.0 - 47.0 %    MCV 94 78 - 100 fl    MCH 30.6 26.5 - 33.0 pg    MCHC 32.7 31.5 - 36.5 g/dL    RDW 15.5 (H) 10.0 - 15.0 %    Platelet Count 219 150 - 450 10e9/L   AST   Result Value Ref Range    AST 18 0 - 45 U/L   ALT   Result Value Ref Range    ALT 13 0 - 50 U/L   Creatinine   Result Value Ref Range    Creatinine 0.53 0.52 - 1.04 mg/dL    GFR Estimate >90 >60 mL/min/[1.73_m2]    GFR Estimate If Black >90 >60 mL/min/[1.73_m2]   Protein  random urine with Creat Ratio   Result Value Ref Range    Protein Random Urine 0.31 g/L    Protein Total Urine g/gr Creatinine 0.25 (H) 0 - 0.2 g/g Cr   Creatinine urine calculation only   Result Value Ref Range    Creatinine Urine 127 mg/dL   CBC with platelets differential   Result Value Ref Range    WBC 8.5 4.0 - 11.0 10e9/L    RBC Count 3.85 3.8 - 5.2 10e12/L    Hemoglobin 11.8 11.7 - 15.7 g/dL    Hematocrit 35.8 35.0 - 47.0 %    MCV 93 78 - 100 fl    MCH 30.6 26.5 - 33.0 pg    MCHC 33.0 31.5 - 36.5 g/dL    RDW 15.5 (H) 10.0 - 15.0 %    Platelet Count 222 150 - 450 10e9/L    Diff Method Automated Method     % Neutrophils 77.4 %    % Lymphocytes  14.5 %    % Monocytes 6.0 %    % Eosinophils 1.4 %    % Basophils 0.1 %    % Immature Granulocytes 0.6 %    Nucleated RBCs 0 0 /100    Absolute Neutrophil 6.5 1.6 - 8.3 10e9/L    Absolute Lymphocytes 1.2 0.8 - 5.3 10e9/L    Absolute Monocytes 0.5 0.0 - 1.3 10e9/L    Absolute Eosinophils 0.1 0.0 - 0.7 10e9/L    Absolute Basophils 0.0 0.0 - 0.2 10e9/L    Abs Immature Granulocytes 0.1 0 - 0.4 10e9/L    Absolute Nucleated RBC 0.0    ABO/Rh type and screen   Result Value Ref Range    ABO A     RH(D) Pos     Antibody Screen Neg     Test Valid Only At          [facility]    Specimen Expires 2019        Assessment: Awilda Campos is a 33 year old  at 40w4d by 10 week US, here for induction of labor due to gestational hypertension.   She has had 2 BP's > 4 hours apart meeting criteria for gestational hypertension   .    Plan:  Induction of labor:  - Admit to labor and delivery for induction of labor due to gestational hypertension.   - cervical ripening with misoprostol, transcervical catheter when able  - augmentation with AROM and pitocin  - GBS negative; antibiotics not indicated  - Rh pos - rhogam not indicated; Rubella immune.   - Anticipate     Hashimoto thyroiditis  - PTA levothyroxine and liothryonine    Asthma:  - PTA albuterol inhaler ordered  - avoid hemabate for postpartum hemorrhage     Fetal well-being  - Category 1 FHT, reactive    Patient seen and care plan discussed under supervision of Dr. Martin.    Iveth Guevara, MS3    I was present with the medical student who participated in the service and in the documentation of this note.  I have verified the history and personally performed the physical exam and medical decision making, and have verified the content of the note, which accurately reflect my assessment of the patient and the plan of care.    Diamond Beard MD PhD  Ob/Gyn PGY-3  2019 4:42 PM    Physician Attestation   Yessenia KIRKPATRICK  Mario, saw this patient with the resident and agree with the resident/fellow's findings and plan of care as documented in the note.      I personally reviewed vital signs, medications, labs and fetal heart tones and toco.    Key findings: patient here with gestational hypertension at 40+4 wks, possible evolving PreE with slightly elevated Urine pn.  BP in triage today ~ 145/82.  No benefit to patient to remain pregnant at this point. Cervix unfavorable so will begin cervical ripening and monitor for further elevated BP's.      Procedure:   Cervix as noted above   cytotec placed in posterior fornix at 1600 without complication. Placed by resident, under my direct supervision.   Patient tolerated it well.      MD Yessenia Martinez MD  Date of Service (when I saw the patient): 01/16/19

## 2019-01-17 LAB — T PALLIDUM AB SER QL: NONREACTIVE

## 2019-01-17 PROCEDURE — 25000125 ZZHC RX 250: Performed by: STUDENT IN AN ORGANIZED HEALTH CARE EDUCATION/TRAINING PROGRAM

## 2019-01-17 PROCEDURE — 12000001 ZZH R&B MED SURG/OB UMMC

## 2019-01-17 PROCEDURE — 25000132 ZZH RX MED GY IP 250 OP 250 PS 637: Performed by: STUDENT IN AN ORGANIZED HEALTH CARE EDUCATION/TRAINING PROGRAM

## 2019-01-17 PROCEDURE — 25000132 ZZH RX MED GY IP 250 OP 250 PS 637: Performed by: OBSTETRICS & GYNECOLOGY

## 2019-01-17 PROCEDURE — 25000128 H RX IP 250 OP 636: Performed by: STUDENT IN AN ORGANIZED HEALTH CARE EDUCATION/TRAINING PROGRAM

## 2019-01-17 PROCEDURE — 3E0R3BZ INTRODUCTION OF ANESTHETIC AGENT INTO SPINAL CANAL, PERCUTANEOUS APPROACH: ICD-10-PCS | Performed by: ANESTHESIOLOGY

## 2019-01-17 PROCEDURE — 00HU33Z INSERTION OF INFUSION DEVICE INTO SPINAL CANAL, PERCUTANEOUS APPROACH: ICD-10-PCS | Performed by: ANESTHESIOLOGY

## 2019-01-17 RX ORDER — SODIUM CHLORIDE, SODIUM LACTATE, POTASSIUM CHLORIDE, CALCIUM CHLORIDE 600; 310; 30; 20 MG/100ML; MG/100ML; MG/100ML; MG/100ML
INJECTION, SOLUTION INTRAVENOUS CONTINUOUS
Status: DISCONTINUED | OUTPATIENT
Start: 2019-01-17 | End: 2019-01-18

## 2019-01-17 RX ORDER — EPHEDRINE SULFATE 50 MG/ML
5 INJECTION, SOLUTION INTRAMUSCULAR; INTRAVENOUS; SUBCUTANEOUS
Status: DISCONTINUED | OUTPATIENT
Start: 2019-01-17 | End: 2019-01-18

## 2019-01-17 RX ORDER — OXYTOCIN 10 [USP'U]/ML
INJECTION, SOLUTION INTRAMUSCULAR; INTRAVENOUS
Status: DISCONTINUED
Start: 2019-01-17 | End: 2019-01-18 | Stop reason: HOSPADM

## 2019-01-17 RX ORDER — LIDOCAINE 40 MG/G
CREAM TOPICAL
Status: DISCONTINUED | OUTPATIENT
Start: 2019-01-17 | End: 2019-01-18

## 2019-01-17 RX ORDER — OXYTOCIN/0.9 % SODIUM CHLORIDE 30/500 ML
1-24 PLASTIC BAG, INJECTION (ML) INTRAVENOUS CONTINUOUS
Status: DISCONTINUED | OUTPATIENT
Start: 2019-01-17 | End: 2019-01-18

## 2019-01-17 RX ORDER — NALBUPHINE HYDROCHLORIDE 10 MG/ML
2.5-5 INJECTION, SOLUTION INTRAMUSCULAR; INTRAVENOUS; SUBCUTANEOUS EVERY 6 HOURS PRN
Status: DISCONTINUED | OUTPATIENT
Start: 2019-01-17 | End: 2019-01-18

## 2019-01-17 RX ORDER — FENTANYL CITRATE 50 UG/ML
100 INJECTION, SOLUTION INTRAMUSCULAR; INTRAVENOUS ONCE
Status: COMPLETED | OUTPATIENT
Start: 2019-01-17 | End: 2019-01-17

## 2019-01-17 RX ORDER — MISOPROSTOL 200 UG/1
TABLET ORAL
Status: DISCONTINUED
Start: 2019-01-17 | End: 2019-01-18 | Stop reason: HOSPADM

## 2019-01-17 RX ORDER — OXYTOCIN/0.9 % SODIUM CHLORIDE 30/500 ML
PLASTIC BAG, INJECTION (ML) INTRAVENOUS
Status: DISCONTINUED
Start: 2019-01-17 | End: 2019-01-18 | Stop reason: HOSPADM

## 2019-01-17 RX ORDER — MORPHINE SULFATE 10 MG/ML
10 INJECTION, SOLUTION INTRAMUSCULAR; INTRAVENOUS ONCE
Status: COMPLETED | OUTPATIENT
Start: 2019-01-17 | End: 2019-01-17

## 2019-01-17 RX ORDER — NALOXONE HYDROCHLORIDE 0.4 MG/ML
.1-.4 INJECTION, SOLUTION INTRAMUSCULAR; INTRAVENOUS; SUBCUTANEOUS
Status: DISCONTINUED | OUTPATIENT
Start: 2019-01-17 | End: 2019-01-18

## 2019-01-17 RX ORDER — LIDOCAINE HYDROCHLORIDE 10 MG/ML
INJECTION, SOLUTION INFILTRATION; PERINEURAL
Status: DISCONTINUED
Start: 2019-01-17 | End: 2019-01-18 | Stop reason: HOSPADM

## 2019-01-17 RX ADMIN — OXYTOCIN-SODIUM CHLORIDE 0.9% IV SOLN 30 UNIT/500ML 2 MILLI-UNITS/MIN: 30-0.9/5 SOLUTION at 17:25

## 2019-01-17 RX ADMIN — SODIUM CHLORIDE, POTASSIUM CHLORIDE, SODIUM LACTATE AND CALCIUM CHLORIDE 1000 ML: 600; 310; 30; 20 INJECTION, SOLUTION INTRAVENOUS at 16:20

## 2019-01-17 RX ADMIN — Medication 25 MCG: at 08:01

## 2019-01-17 RX ADMIN — SODIUM CHLORIDE, POTASSIUM CHLORIDE, SODIUM LACTATE AND CALCIUM CHLORIDE: 600; 310; 30; 20 INJECTION, SOLUTION INTRAVENOUS at 17:21

## 2019-01-17 RX ADMIN — LIOTHYRONINE SODIUM 5 MCG: 5 TABLET ORAL at 06:45

## 2019-01-17 RX ADMIN — LIDOCAINE: 40 CREAM TOPICAL at 12:55

## 2019-01-17 RX ADMIN — LEVOTHYROXINE SODIUM 88 MCG: 88 TABLET ORAL at 07:20

## 2019-01-17 RX ADMIN — MORPHINE SULFATE 10 MG: 10 INJECTION INTRAVENOUS at 03:17

## 2019-01-17 RX ADMIN — Medication: at 17:30

## 2019-01-17 RX ADMIN — NALBUPHINE HYDROCHLORIDE 2.5 MG: 10 INJECTION, SOLUTION INTRAMUSCULAR; INTRAVENOUS; SUBCUTANEOUS at 20:41

## 2019-01-17 RX ADMIN — FENTANYL CITRATE 100 MCG: 50 INJECTION, SOLUTION INTRAMUSCULAR; INTRAVENOUS at 13:05

## 2019-01-17 RX ADMIN — FENTANYL CITRATE 100 MCG: 50 INJECTION, SOLUTION INTRAMUSCULAR; INTRAVENOUS at 00:45

## 2019-01-17 RX ADMIN — Medication 25 MCG: at 00:55

## 2019-01-17 ASSESSMENT — ANXIETY QUESTIONNAIRES: GAD7 TOTAL SCORE: 3

## 2019-01-17 NOTE — PLAN OF CARE
Patient has been odell mostly every 4-6 minutes today. Some of them feel crampy, but she has also been able to sleep some too. FHR  135-145 with min to mod abhilash and occ accels and occ late decels. Vaginal miso at 0800. Cook catheter placed by Dr Wilson at 1315. 80cc in both vaginal and uterine balloons of cook cath. Patient received 100 mg Fentanyl IV before placing the cathter so she could tolerate it. Patient tolerated it fine. /63 and 110/63 today.Denies headache or other sypmtoms of pre eclampsia. Membranes intact. Having some bloody show with cervical checks. Cervix is 2-3/ 50/-3 at 1315.

## 2019-01-17 NOTE — PROVIDER NOTIFICATION
01/16/19 1926   Provider Notification   Provider Name/Title Dr. Martin   Method of Notification In Department   Request Evaluate - Remote     Reviewed uterine activity with provider - OK for RN to give next dose of vaginal misoprostol at 2000.

## 2019-01-17 NOTE — PROGRESS NOTES
Labor Progress Note  2019, 1:21 PM     Subjective:  Feels occasional mild contractions. Nice with the fentanyl now.     Objective:  Vitals:    19 0100 19 0440 19 0800 19 0950   BP: 124/61 118/61 116/65 115/63   Resp:  16   Temp: 98.2  F (36.8  C) 98.8  F (37.1  C) 98.8  F (37.1  C)    TempSrc: Axillary Oral Oral         Gen: Appears comfortable, sitting up in bed  SVE: External os dilated to 1 cm, difficult to reach internal os/long/high. Attempted Cook catheter placement, however when inflating balloon, balloon came out of cervix after attempt x2.    FHT: 145 BPM, mod abhilash, accel x1 present, one shallow late decel ~1300    A/P:  Awilda Campos is a 33 year old  at 40w5d admitted for IOL for gHTN.     #) Labor:   S/p PV Misoprostol x3 for cervical ripening, now with Cook 80/80 in place. Add Pitocin.     #) FWB:   Generally category I other than single deceleration. Overall reactive. Continue EFM and toco. Cephalic BSUS. EFW 8.5#  GBS neg    #) GHTN:   BP's normotensive currently. HELLP labs wnl on admit. Continue to monitor.     #) PNC:   Rh pos, rubella immune    #) Hashimoto thyroiditis  - PTA levothyroxine and liothryonine     #) Asthma:  - PTA albuterol inhaler ordered  - avoid hemabate for postpartum hemorrhage     Flavia Wilson MD PGY4  OB/Gyn  2019, 1:21 PM

## 2019-01-17 NOTE — PROGRESS NOTES
[facility] PROGRESS NOTE:   2019   9:51 AM         SUBJECTIVE:   Patient eager to get into active labor.  She has no complaints.  Baby's movements are active and there is no loss of fluid.  Scant bloody show is present.            OBJECTIVE:   /65   Temp 98.8  F (37.1  C) (Oral)   Resp 16   LMP 04/10/2018     NST:  Fetal Heart Rate Tracing:   Category 1    Tocometer: Occasional    Abdomen:  Soft, EFW 8-8.5 lb   Cervix:   Dilation: 2   Effacement: 40%   Station:-4   Consistency: soft   Position: Mid           LABS:   No results found for this or any previous visit (from the past 12 hour(s)).           ASSESSMENT:   33 year old  at 40w5d   induction of labor, indication post dates.  With cervical change.  Further cervical ripening is indicated due to fetal station and lack of effacement, another dose of misoprostol will be given.    I reassured patient and her  that progress is being made and I anticipate labor and  today.          PLAN:   Labor induction with cytotec and IV pitocin augmentation as indicated; AROM when feasible.  Anticpate .           Bryanna Gore MD

## 2019-01-17 NOTE — PROGRESS NOTES
Labor Progress Note    Subjective:  Starting to feel more cramping.     Objective:  Vitals:    19 1420 19 1435 19 1700 19   BP: 120/69 145/82 112/62 136/73   Resp:   18 18   Temp:   98.4  F (36.9  C) 98.8  F (37.1  C)   TempSrc:   Oral Oral        Gen: Appears comfortable, sitting up in bed  SVE: External os dilated to 1 cm, difficult to reach internal os/long/high. Attempted Cook catheter placement, however when inflating balloon, balloon came out of cervix after attempt x2.    FHT: Baseline 130, moderate variability, accels present, no decels  [place]: q2-6 min ctx    A/P:  Awilda Campos is a 33 year old  at 40w4d admitted for IOL for gHTN.     Labor:   S/p PV Misoprostol x2 for cervical ripening. 3rd PV Misoprostol placed. Discussed plan to continue PV Miso followed by re-assessment for Cook catheter when appropriate. GBS neg.    FWB:   Category I, reactive. Continue EFM and toco. Cephalic BSUS. EFW 8.5#    GHTN:   BP's overall normotensive with rare mild range. HELLP labs wnl on admit. Continue to monitor.     PNC:   Rh pos, rubella immune    Hashimoto thyroiditis  - PTA levothyroxine and liothryonine     Asthma:  - PTA albuterol inhaler ordered  - avoid hemabate for postpartum hemorrhage     Kayce Torrez MD  Ob/Gyn PGY-2

## 2019-01-17 NOTE — PLAN OF CARE
VSS. Afebrile. Pt denies LOF, vaginal bleeding, headache, vision changes. Pt feeling cramping and contractions overnight. Cook catheter placement attempted after one dose fentanyl, unsuccessfully. Pt received third dose of vaginal miso and morphine (see MAR), pt able to rest. See flowsheets for EFM. Contractions difficult to monitor overnight while patient was laying lateral. Support person at bedside. Asking appropriate questions about induction process. Using aromatherapy and hot packs. Cervix 2 cm and felt more effaced this morning per RN SVE. Pt going to eat breakfast. Plan to have oncoming providers recheck cervix and develop further induction plan.

## 2019-01-17 NOTE — ANESTHESIA PROCEDURE NOTES
Epidural Procedure Note    Staff:     Anesthesiologist:  Dunia Lamar MD  Location: OB   Pre-procedure checklist:   patient identified, IV checked, site marked, risks and benefits discussed, informed consent, monitors and equipment checked, pre-op evaluation, at physician/surgeon's request and post-op pain management      Correct Patient: Yes      Correct Position: Yes      Correct Site: Yes      Correct Procedure: Yes      Correct Laterality:  Yes    Site Marked:  Yes  Procedure:     Procedure:  Epidural catheter    Position:  Sitting    Sterile Prep: chloraprep      Approach:  Midline    Needle gauge (G):  17    Needle Length (in):  3.5    Block Needle Type:  Touhy    Injection Technique:  LORT saline    Attempts:  1    Redirects:  3    Catheter threaded easily: Yes      Paresthesias:  No    Test dose (mL):  3    Test dose time:  17:20

## 2019-01-17 NOTE — PROGRESS NOTES
Labor Progress Note    Subjective:  Patient feeling well. Felt very mild cramping with first dose of Cytotec.     Objective:  Vitals:    19 1219 19 1335 19 1350 19 1700   BP: 112/72 118/72 118/67 112/62   Resp:    18   Temp:    98.4  F (36.9  C)   TempSrc:    Oral        Gen: Appears comfortable, sitting up in bed  SVE: Deferred    FHT: Baseline 130, moderate variability, accels present, no decels  [place]: Rare ctx with baseline irritability    A/P:  Awilda Campos is a 33 year old  at 40w4d admitted for IOL for gHTN.     Labor:   Continue PV Misoprostol for cervical ripening, next dose due at . Plan to recheck cervix when prior to 4th dose. GBS neg.    FWB:   Category I, reactive. Continue EFM and toco. Cephalic BSUS. EFW 8.5#    GHTN:   BP's overall normotensive with rare mild range. HELLP labs wnl on admit. Continue to monitor.     PNC:   Rh pos, rubella immune    Hashimoto thyroiditis  - PTA levothyroxine and liothryonine     Asthma:  - PTA albuterol inhaler ordered  - avoid hemabate for postpartum hemorrhage     Kayce Torrez MD  Ob/Gyn PGY-2  19 7:45 PM

## 2019-01-17 NOTE — ANESTHESIA PREPROCEDURE EVALUATION
"Anesthesia Pre-Procedure Evaluation    Patient: Awilda Campos   MRN:     [id number] Gender:   female   Age:    33 year old :      1985        Preoperative Diagnosis: * No pre-op diagnosis entered *   * No procedures listed *     Past Medical History:   Diagnosis Date     Asthma      Hashimoto's disease      Uncomplicated asthma     Uses an inhaler as needed      Past Surgical History:   Procedure Laterality Date     NO HISTORY OF SURGERY            Anesthesia Evaluation     .      History of anesthetic complications (hx \"high epidural\" in the past)          ROS/MED HX    ENT/Pulmonary: Comment: Mild intermittent, albuterol PRN    (+)asthma , . .    Neurologic:  - neg neurologic ROS     Cardiovascular:  - neg cardiovascular ROS       METS/Exercise Tolerance:     Hematologic:         Musculoskeletal:         GI/Hepatic:  - neg GI/hepatic ROS       Renal/Genitourinary:         Endo:         Psychiatric:         Infectious Disease:         Malignancy:         Other:                     SANDY CASTRO AN PHYSICAL EXAM    Lab Results   Component Value Date    WBC 8.5 2019    HGB 11.8 2019    HCT 35.8 2019     2019    CR 0.53 2019    ALT 13 2019    AST 18 2019    PTT 27 10/31/2018    INR 1.00 10/31/2018    FIBR 291 10/31/2018    TSH 2.75 2018       Preop Vitals  BP Readings from Last 3 Encounters:   19 112/62   19 141/77   01/10/19 133/80    Pulse Readings from Last 3 Encounters:   19 105   01/10/19 104   19 97      Resp Readings from Last 3 Encounters:   19 18   10/30/18 18   09 18    SpO2 Readings from Last 3 Encounters:   19 96%   18 98%   18 97%      Temp Readings from Last 1 Encounters:   19 36.9  C (98.4  F) (Oral)    Ht Readings from Last 1 Encounters:   18 1.651 m (5' 5\")      Wt Readings from Last 1 Encounters:   19 90.6 kg (199 lb 11.2 oz)    Estimated body mass index is 33.23 kg/m  as " "calculated from the following:    Height as of 12/14/18: 1.651 m (5' 5\").    Weight as of an earlier encounter on 1/16/19: 90.6 kg (199 lb 11.2 oz).     LDA:            Assessment:   ASA SCORE: 2    NPO Status: Increased aspiration risk   Documentation: Deferred        Plan:   Anes. Type:  Regional     RA Details:  Labor/OB Procedure; Catheter     RA-Location/Type: Epidural (L)   Pre-Induction: None   Induction:  Not applicable   Airway: Native Airway   Access/Monitoring: PIV   Maintenance: LA-Catheter   Emergence: N/a   Logistics: Remote Procedure; Observation/Admission     Postop Pain/Sedation Strategy:  Advanced Options: LA-Catheter     PONV Management:Adult Risk Factors: Female                   Gestational HTN, hasimoto's thyroiditis             Giovanni Shields MD  "

## 2019-01-18 LAB
ABO + RH BLD: NORMAL
ABO + RH BLD: NORMAL
BLD GP AB SCN SERPL QL: NORMAL
BLOOD BANK CMNT PATIENT-IMP: NORMAL
HGB BLD-MCNC: 10.7 G/DL (ref 11.7–15.7)
PLATELET # BLD AUTO: 182 10E9/L (ref 150–450)
SPECIMEN EXP DATE BLD: NORMAL
T PALLIDUM AB SER QL: NONREACTIVE

## 2019-01-18 PROCEDURE — 25000128 H RX IP 250 OP 636: Performed by: STUDENT IN AN ORGANIZED HEALTH CARE EDUCATION/TRAINING PROGRAM

## 2019-01-18 PROCEDURE — 85018 HEMOGLOBIN: CPT | Performed by: OBSTETRICS & GYNECOLOGY

## 2019-01-18 PROCEDURE — 72200001 ZZH LABOR CARE VAGINAL DELIVERY SINGLE

## 2019-01-18 PROCEDURE — 59400 OBSTETRICAL CARE: CPT | Mod: GC | Performed by: OBSTETRICS & GYNECOLOGY

## 2019-01-18 PROCEDURE — 25000132 ZZH RX MED GY IP 250 OP 250 PS 637: Performed by: STUDENT IN AN ORGANIZED HEALTH CARE EDUCATION/TRAINING PROGRAM

## 2019-01-18 PROCEDURE — 25000125 ZZHC RX 250: Performed by: STUDENT IN AN ORGANIZED HEALTH CARE EDUCATION/TRAINING PROGRAM

## 2019-01-18 PROCEDURE — 85049 AUTOMATED PLATELET COUNT: CPT | Performed by: OBSTETRICS & GYNECOLOGY

## 2019-01-18 PROCEDURE — 25000132 ZZH RX MED GY IP 250 OP 250 PS 637: Performed by: OBSTETRICS & GYNECOLOGY

## 2019-01-18 PROCEDURE — 86901 BLOOD TYPING SEROLOGIC RH(D): CPT | Performed by: OBSTETRICS & GYNECOLOGY

## 2019-01-18 PROCEDURE — 36416 COLLJ CAPILLARY BLOOD SPEC: CPT | Performed by: OBSTETRICS & GYNECOLOGY

## 2019-01-18 PROCEDURE — 0KQM0ZZ REPAIR PERINEUM MUSCLE, OPEN APPROACH: ICD-10-PCS | Performed by: OBSTETRICS & GYNECOLOGY

## 2019-01-18 PROCEDURE — 86900 BLOOD TYPING SEROLOGIC ABO: CPT | Performed by: OBSTETRICS & GYNECOLOGY

## 2019-01-18 PROCEDURE — 12000001 ZZH R&B MED SURG/OB UMMC

## 2019-01-18 PROCEDURE — 86780 TREPONEMA PALLIDUM: CPT | Performed by: OBSTETRICS & GYNECOLOGY

## 2019-01-18 PROCEDURE — 86850 RBC ANTIBODY SCREEN: CPT | Performed by: OBSTETRICS & GYNECOLOGY

## 2019-01-18 PROCEDURE — 25000128 H RX IP 250 OP 636: Performed by: OBSTETRICS & GYNECOLOGY

## 2019-01-18 RX ORDER — CARBOPROST TROMETHAMINE 250 UG/ML
250 INJECTION, SOLUTION INTRAMUSCULAR
Status: DISCONTINUED | OUTPATIENT
Start: 2019-01-18 | End: 2019-01-19 | Stop reason: HOSPADM

## 2019-01-18 RX ORDER — OXYTOCIN/0.9 % SODIUM CHLORIDE 30/500 ML
100 PLASTIC BAG, INJECTION (ML) INTRAVENOUS CONTINUOUS
Status: DISCONTINUED | OUTPATIENT
Start: 2019-01-18 | End: 2019-01-19 | Stop reason: HOSPADM

## 2019-01-18 RX ORDER — LANOLIN 100 %
OINTMENT (GRAM) TOPICAL
Status: DISCONTINUED | OUTPATIENT
Start: 2019-01-18 | End: 2019-01-19 | Stop reason: HOSPADM

## 2019-01-18 RX ORDER — SODIUM CHLORIDE, SODIUM LACTATE, POTASSIUM CHLORIDE, CALCIUM CHLORIDE 600; 310; 30; 20 MG/100ML; MG/100ML; MG/100ML; MG/100ML
INJECTION, SOLUTION INTRAVENOUS CONTINUOUS
Status: DISCONTINUED | OUTPATIENT
Start: 2019-01-18 | End: 2019-01-19 | Stop reason: HOSPADM

## 2019-01-18 RX ORDER — OXYTOCIN 10 [USP'U]/ML
10 INJECTION, SOLUTION INTRAMUSCULAR; INTRAVENOUS
Status: DISCONTINUED | OUTPATIENT
Start: 2019-01-18 | End: 2019-01-19 | Stop reason: HOSPADM

## 2019-01-18 RX ORDER — NALOXONE HYDROCHLORIDE 0.4 MG/ML
.1-.4 INJECTION, SOLUTION INTRAMUSCULAR; INTRAVENOUS; SUBCUTANEOUS
Status: DISCONTINUED | OUTPATIENT
Start: 2019-01-18 | End: 2019-01-18

## 2019-01-18 RX ORDER — IBUPROFEN 800 MG/1
800 TABLET, FILM COATED ORAL
Status: DISCONTINUED | OUTPATIENT
Start: 2019-01-18 | End: 2019-01-18 | Stop reason: ALTCHOICE

## 2019-01-18 RX ORDER — OXYCODONE AND ACETAMINOPHEN 5; 325 MG/1; MG/1
1 TABLET ORAL
Status: DISCONTINUED | OUTPATIENT
Start: 2019-01-18 | End: 2019-01-19 | Stop reason: HOSPADM

## 2019-01-18 RX ORDER — LIDOCAINE 40 MG/G
CREAM TOPICAL
Status: DISCONTINUED | OUTPATIENT
Start: 2019-01-18 | End: 2019-01-19 | Stop reason: HOSPADM

## 2019-01-18 RX ORDER — NALOXONE HYDROCHLORIDE 0.4 MG/ML
.1-.4 INJECTION, SOLUTION INTRAMUSCULAR; INTRAVENOUS; SUBCUTANEOUS
Status: DISCONTINUED | OUTPATIENT
Start: 2019-01-18 | End: 2019-01-19 | Stop reason: HOSPADM

## 2019-01-18 RX ORDER — HYDROCORTISONE 2.5 %
CREAM (GRAM) TOPICAL 3 TIMES DAILY PRN
Status: DISCONTINUED | OUTPATIENT
Start: 2019-01-18 | End: 2019-01-19 | Stop reason: HOSPADM

## 2019-01-18 RX ORDER — CLINDAMYCIN PHOSPHATE 900 MG/50ML
900 INJECTION, SOLUTION INTRAVENOUS EVERY 8 HOURS
Status: COMPLETED | OUTPATIENT
Start: 2019-01-18 | End: 2019-01-18

## 2019-01-18 RX ORDER — AMOXICILLIN 250 MG
1 CAPSULE ORAL 2 TIMES DAILY
Status: DISCONTINUED | OUTPATIENT
Start: 2019-01-18 | End: 2019-01-19 | Stop reason: HOSPADM

## 2019-01-18 RX ORDER — OXYTOCIN/0.9 % SODIUM CHLORIDE 30/500 ML
100-340 PLASTIC BAG, INJECTION (ML) INTRAVENOUS CONTINUOUS PRN
Status: DISCONTINUED | OUTPATIENT
Start: 2019-01-18 | End: 2019-01-19 | Stop reason: HOSPADM

## 2019-01-18 RX ORDER — ACETAMINOPHEN 325 MG/1
650 TABLET ORAL EVERY 4 HOURS PRN
Status: DISCONTINUED | OUTPATIENT
Start: 2019-01-18 | End: 2019-01-18

## 2019-01-18 RX ORDER — AMOXICILLIN 250 MG
2 CAPSULE ORAL 2 TIMES DAILY
Status: DISCONTINUED | OUTPATIENT
Start: 2019-01-18 | End: 2019-01-19 | Stop reason: HOSPADM

## 2019-01-18 RX ORDER — BISACODYL 10 MG
10 SUPPOSITORY, RECTAL RECTAL DAILY PRN
Status: DISCONTINUED | OUTPATIENT
Start: 2019-01-20 | End: 2019-01-19 | Stop reason: HOSPADM

## 2019-01-18 RX ORDER — ACETAMINOPHEN 325 MG/1
650 TABLET ORAL EVERY 4 HOURS PRN
Status: DISCONTINUED | OUTPATIENT
Start: 2019-01-18 | End: 2019-01-19 | Stop reason: HOSPADM

## 2019-01-18 RX ORDER — MISOPROSTOL 200 UG/1
800 TABLET ORAL
Status: DISCONTINUED | OUTPATIENT
Start: 2019-01-18 | End: 2019-01-19 | Stop reason: HOSPADM

## 2019-01-18 RX ORDER — FENTANYL CITRATE 50 UG/ML
50-100 INJECTION, SOLUTION INTRAMUSCULAR; INTRAVENOUS
Status: DISCONTINUED | OUTPATIENT
Start: 2019-01-18 | End: 2019-01-19 | Stop reason: HOSPADM

## 2019-01-18 RX ORDER — METHYLERGONOVINE MALEATE 0.2 MG/ML
200 INJECTION INTRAVENOUS
Status: DISCONTINUED | OUTPATIENT
Start: 2019-01-18 | End: 2019-01-19 | Stop reason: HOSPADM

## 2019-01-18 RX ORDER — OXYTOCIN/0.9 % SODIUM CHLORIDE 30/500 ML
340 PLASTIC BAG, INJECTION (ML) INTRAVENOUS CONTINUOUS PRN
Status: DISCONTINUED | OUTPATIENT
Start: 2019-01-18 | End: 2019-01-19 | Stop reason: HOSPADM

## 2019-01-18 RX ORDER — ONDANSETRON 2 MG/ML
4 INJECTION INTRAMUSCULAR; INTRAVENOUS EVERY 6 HOURS PRN
Status: DISCONTINUED | OUTPATIENT
Start: 2019-01-18 | End: 2019-01-19 | Stop reason: HOSPADM

## 2019-01-18 RX ORDER — IBUPROFEN 600 MG/1
600 TABLET, FILM COATED ORAL EVERY 6 HOURS PRN
Status: DISCONTINUED | OUTPATIENT
Start: 2019-01-18 | End: 2019-01-19 | Stop reason: HOSPADM

## 2019-01-18 RX ORDER — METHYLERGONOVINE MALEATE 0.2 MG/ML
200 INJECTION INTRAVENOUS
Status: DISCONTINUED | OUTPATIENT
Start: 2019-01-18 | End: 2019-01-18

## 2019-01-18 RX ADMIN — CLINDAMYCIN IN 5 PERCENT DEXTROSE 900 MG: 18 INJECTION, SOLUTION INTRAVENOUS at 21:22

## 2019-01-18 RX ADMIN — SODIUM CHLORIDE, POTASSIUM CHLORIDE, SODIUM LACTATE AND CALCIUM CHLORIDE 500 ML: 600; 310; 30; 20 INJECTION, SOLUTION INTRAVENOUS at 00:45

## 2019-01-18 RX ADMIN — LEVOTHYROXINE SODIUM 88 MCG: 88 TABLET ORAL at 09:11

## 2019-01-18 RX ADMIN — ACETAMINOPHEN 650 MG: 325 TABLET, FILM COATED ORAL at 17:34

## 2019-01-18 RX ADMIN — CLINDAMYCIN IN 5 PERCENT DEXTROSE 900 MG: 18 INJECTION, SOLUTION INTRAVENOUS at 13:35

## 2019-01-18 RX ADMIN — IBUPROFEN 800 MG: 800 TABLET ORAL at 03:30

## 2019-01-18 RX ADMIN — CLINDAMYCIN IN 5 PERCENT DEXTROSE 900 MG: 18 INJECTION, SOLUTION INTRAVENOUS at 05:19

## 2019-01-18 RX ADMIN — STANDARDIZED SENNA CONCENTRATE AND DOCUSATE SODIUM 2 TABLET: 8.6; 5 TABLET, FILM COATED ORAL at 09:12

## 2019-01-18 RX ADMIN — IBUPROFEN 600 MG: 600 TABLET ORAL at 21:22

## 2019-01-18 RX ADMIN — SODIUM CHLORIDE, POTASSIUM CHLORIDE, SODIUM LACTATE AND CALCIUM CHLORIDE: 600; 310; 30; 20 INJECTION, SOLUTION INTRAVENOUS at 05:20

## 2019-01-18 RX ADMIN — ACETAMINOPHEN 650 MG: 325 TABLET, FILM COATED ORAL at 21:22

## 2019-01-18 RX ADMIN — LIOTHYRONINE SODIUM 5 MCG: 5 TABLET ORAL at 09:11

## 2019-01-18 RX ADMIN — GENTAMICIN SULFATE 350 MG: 40 INJECTION, SOLUTION INTRAMUSCULAR; INTRAVENOUS at 06:39

## 2019-01-18 RX ADMIN — IBUPROFEN 600 MG: 600 TABLET ORAL at 15:24

## 2019-01-18 RX ADMIN — IBUPROFEN 600 MG: 600 TABLET ORAL at 09:12

## 2019-01-18 RX ADMIN — STANDARDIZED SENNA CONCENTRATE AND DOCUSATE SODIUM 2 TABLET: 8.6; 5 TABLET, FILM COATED ORAL at 21:22

## 2019-01-18 RX ADMIN — SODIUM CHLORIDE, POTASSIUM CHLORIDE, SODIUM LACTATE AND CALCIUM CHLORIDE: 600; 310; 30; 20 INJECTION, SOLUTION INTRAVENOUS at 01:33

## 2019-01-18 NOTE — L&D DELIVERY NOTE
OB Vaginal Delivery Note    Awilda Campos MRN# [id number]   Age: 33 year old YOB: 1985       GA: 40w6d  GP:   Labor Complications: None   EBL: 200  mL  QBL:    Delivery Type: Vaginal, Spontaneous   ROM to Delivery Time: 6h 45m   Weight: 3.544 kg (7 lb 13 oz)    1 Minute 5 Minute 10 Minute   Apgar Totals: 8    9                  Awilda Campos is a 33 year old now  who presented to L&D for IOL for gestational hypertension. She received misoprostol, Cook catheter, pitocin, and amniotomy for induction. She used an epidural for pain management and progressed well to complete. After laboring down for about 40 minutes she started pushing with good maternal effort. She delivered a vigorous male infant in left occiput anterior position. Shoulders delivered easily and a loose nuchal cord was delivered through. Infant placed on maternal abdomen. Delayed cord clamping for one minute. Placenta delivered with gentle traction. Second degree perineal laceration repaired with interrupted 3-0 Vicryl suture. Uterine fundus was massaged and found to be firm and hemostatic. EBL 200ml. Dr. Gore was present for entire delivery and repair.       Delivery Details:  Awilda Campos, a 33 year old  female delivered a viable infant with apgars of 8   and 9  . Patient was fully dilated and pushing after 5  hours 48  minutes in active labor. Delivery was via vaginal, spontaneous  to a sterile field under epidural  anesthesia. Infant delivered in vertex  left  occiput  anterior  position. Anterior and posterior shoulders delivered without difficulty. The cord was clamped, cut twice and 3 vessels  were noted. Cord blood was obtained in routine fashion with the following disposition: lab .      Cord complications: none   Placenta delivered at 2019  1:46 AM . Placental disposition was   . Fundal massage performed and fundus found to be firm.     Episiotomy: none    Perineum, vagina, cervix were inspected,  and the following lacerations were noted:   Perineal lacerations: 2nd                     Any lacerations were repaired in the usual fashion using 3-0 Vicryl.    Excellent hemostasis was noted. Needle count correct. Infant and patient in delivery room in good and stable condition.        Labor Event Times    Labor onset date:  19 Onset time:   6:57 PM   Dilation complete date:  19 Complete time:  12:45 AM   Start pushing date/time:  2019 0130      Labor Length    1st Stage (hrs):  5 (min):  48   2nd Stage (hrs):  0 (min):  57   3rd Stage (hrs):  0 (min):  4      Labor Events     labor?:  No   steroids:  None  Labor Type:  Induction     Antibiotics received during labor?:  No     Rupture identifier:  Rupture 1  Rupture date/time: 19   Rupture type:  Artificial Rupture of Membranes  Fluid color:  Clear  Fluid odor:  Normal     Induction:  Misoprostol, Oxytocin, Mechanical ripening agent  Induction date/time:     Cervical ripening date/time:     Indications for induction:  Hypertension     1:1 continuous labor support provided by?:  none Labor partogram used?:  no      Delivery/Placenta Date and Time    Delivery Date:  19 Delivery Time:   1:42 AM   Placenta Date/Time:  2019  1:46 AM  Oxytocin given at the time of delivery:  after delivery of baby     Vaginal Counts     Initial count performed by 2 team members:   Two Team Members   MAYRA Gore MD       Needles Suture [place] [employer]   Initial counts 2 0 5    Added to count 0 1 0    Final counts 2 1 5    Placed during labor Accounted for at the end of labor   No NA   No NA   No NA    Final count performed by 2 team members:   Two Team Members   MAYRA Gore MD      Final count correct?:  Yes     Apgars    Living status:  Living   1 Minute 5 Minute 10 Minute 15 Minute 20 Minute   Skin color: 1  1       Heart rate: 2  2       Reflex irritability: 2  2       Muscle tone: 2  2       Respiratory  "effort: 1  2       Total: 8  9       Apgars assigned by:  RON BAPTISTE RN     Cord    Vessels:  3 Vessels Complications:  None   Cord Blood Disposition:  Lab Gases Sent?:  No       Resuscitation    Methods:  None   Care at Delivery:   of viable baby boy. Infant vigorous and crying upon delivery. Delayed cord clamp. Cord clamped. Infant placed on mom's chest, dried and covered with warm blankets.          [place] Measurements    Weight:  7 lb 13 oz Length:  1' 7.5\"   Head circumference:  35.6 cm       Skin to Skin and Feeding Plan    Skin to skin initiation date/time: 1841    Skin to skin with:  Mother  Skin to skin end date/time:     Breastfeeding initiated date/time:  2019 0200  How do you plan to feed your baby:  Breastfeeding     Labor Events and Shoulder Dystocia    Fetal Tracing Prior to Delivery:  Category 2  Fetal Tracing Comments:  Intermittent variable decelerations about 1 hour prior to delivery, remained moderate variablility  Shoulder dystocia present?:  Neg     Delivery (Maternal) (Provider to Complete) ([id number])    Episiotomy:  None  Perineal lacerations:  2nd Repaired?:  Yes   Vaginal laceration?:  No    Cervical laceration?:  No    Est. blood loss (mL):  200  Number of repair packets:  1     Blood Loss  Mother: Sadie Camposit #[id number]   Start of Mother's Information    IO Blood Loss  19 1857 - 19 0257    EBL (mL) Hospital Encounter 200 mL    Total QBL Blood Loss (mL) Hospital Encounter 307 mL    Total  507 mL         End of Mother's Information  Mother: Andres Fiona #[id number]         Delivery - Provider to Complete (052147)    Attempted Delivery Types (Choose all that apply):  Spontaneous Vaginal Delivery  Delivery Type (Choose the 1 that will go to the Birth History):  Vaginal, Spontaneous          Placenta    Delayed Cord Clamping:  Done  Date/Time:  2019  1:46 AM  Removal:  Spontaneous     Anesthesia    Method:  Epidural  Cervical dilation at placement:  " 4-7          Presentation and Position    Presentation:  Vertex  Position:  Left Occiput Anterior           Kimberly French MD

## 2019-01-18 NOTE — PROGRESS NOTES
Dr. French and Dr. Munguia to room per patient request for pain/pressure. Dr. French performs SVE 7/80/-1. JENNIFER Munguia discusses pain plan with patient. Evaluates dermatomes, administers bolus, and educates on use of PCA button. Patient reports some relief from bolus.

## 2019-01-18 NOTE — PROVIDER NOTIFICATION
01/18/19 0200   Provider Notification   Provider Name/Title Dr. French   Method of Notification In Department   Request Evaluate - Remote   Notification Reason Maternal Vital Sign Change   Notified of temperature of 100.6 and patient continued reports of chills. No new orders, continue to monitor.

## 2019-01-18 NOTE — PROGRESS NOTES
Vaginal Delivery Note   of viable Male with Manolo Romero, and Sukhwinder  in attendance. Nursery RN Romelia Molina present.  Infant with spontaneous cry, to mother's abdomen, dried and stimulated.  APGAR at 1 minute:  8 and APGAR at 5 minutes:  9.  Placenta delivered with out complication, Pitocin 340 mu/min., 2nd degree laceration with repair, radha cares provided.  Mother and baby in stable condition.

## 2019-01-18 NOTE — PROVIDER NOTIFICATION
01/18/19 0018   Provider Notification   Provider Name/Title Dr. French   Method of Notification Phone   Request Evaluate - Remote   Notification Reason Maternal Vital Sign Change   Called MD to notify of temperature trends, patient reports of chills, and increasing fetal baseline. Will continue to monitor temperature frequently.

## 2019-01-18 NOTE — PROVIDER NOTIFICATION
01/18/19 0345   Provider Notification   Provider Name/Title Dr. French   Method of Notification In Department   Dr. French to place orders regarding elevated temperatures.

## 2019-01-18 NOTE — PROVIDER NOTIFICATION
01/18/19 0045   Vaginal Exam   Method sterile exam per physician   Cervical Dilation (cm) 10   Cervical Effacement 100%   Fetal Station 1   Dr. French and Dr. Gore at bedside for SVE. 10/100/+1. MDs review strip and discuss laboring down with patient, ok to administer fluid bolus at this time.

## 2019-01-18 NOTE — PLAN OF CARE
Patient arrived to [facility] unit via wheelchair at 0410,with belongings, accompanied by spouse/ significant other, with infant in arms. Received report from Zora KWAN RN at 0330 via phone and checked bands. Patient sleeping right away after vitals assessed, oriented  to room but all other orientation needs to be completed still.  Call light given and within arms reach; no concerns present at this time. Continue with plan of care.

## 2019-01-18 NOTE — PLAN OF CARE
Labor Pain Intervention  DATA:  Cook cath out at 1615. Patient reports increased labor pain and wants epidural before pitocin starts.  INTERVENTIONS: Consent was signed early. Bolus started. Questions answered. Epidural administered per  Dr Lamar.    ASSESSMENT:   Tolerated well.  Relief complete.  PLAN:  Continue intrapartum cares and assessments.  Anticipate .

## 2019-01-18 NOTE — PROVIDER NOTIFICATION
01/18/19 0128   Provider Notification   Provider Name/Title Dr. Gore   Method of Notification At Bedside   MD at bedside to evaluate. Practice pushing begins, pushing effectively.

## 2019-01-18 NOTE — PROGRESS NOTES
Comfortable with epidural.  No concerns.    /73   Temp 98.2  F (36.8  C) (Oral)   Resp 20   LMP 04/10/2018   SpO2 97%     EFM shows Category I tracing.  Contractions not picking up well.    Cervix  4 cm (stretches to 5 readily)  -2 station.  Head feels well applied.  + bloody show      Improved cervical dilation after Jo catheter, on pitocin.  Ready for AROM.      AROM attempted, seemed successful digitally but no fluid.  Repeated, still no fluid.    Continue IOL.    Anticipate .    Bryanna Gore MD

## 2019-01-18 NOTE — PROVIDER NOTIFICATION
01/18/19 0300   Provider Notification   Provider Name/Title Dr. French   Method of Notification Electronic Page   Request Evaluate - Remote   Notification Reason Maternal Vital Sign Change   MD notified of continued fever at this time.

## 2019-01-18 NOTE — PROGRESS NOTES
I was called at 1145pm on 19 regarding pt Awilda Campos, a 32yo  who is currently laboring with augmentation d/t gHTN. She had an epidural placed around 1700 that has been well functioning on serial f/u exams since then. She did c/o pruritis that was successfully treated with 2.5mg nalbuphine. However, over the past hour she was c/o worsened abdominal pain/pressure that was lacking in laterality. VSS. She denies any LAST sxs at this time. The OB provider performed a cervical check and is dilated to 7cm.     On exam, the patient is in visible discomfort with her contractions. She is lying supine at 45 degrees. Epidural level was tested with ice in glove. Dermatomal coverage from T11 and down on the right, T9 and down on the left. She has maintained reasonable strength in her distal lower extremities bilaterally.    Given this lateralization of epidural coverage I suggested that the patient try lying on her right side (bump under left hip) if possible. She was administered a clinician bolus from the pump (8mL) and this seemed to improve her discomfort. I was unable to assess the catheter site d/t pt refusal.  This strikes me as a well-functioning epidural and her increase in discomfort may be 2/2 increased contraction strength 2/2 pitocin vs less likely nalbuphine antagonism of fentanyl. Plan to increase basal rate to 12mL/hr.  The patient was advised to alert her nurse if she starts to experience any sxs of LAST.      -----------------------------------  José Miguel Munguia DO, MSc  Anesthesia Resident, PGY3

## 2019-01-18 NOTE — PLAN OF CARE
VSS. Postpartum check WDL. Pain managed with Ibuprofen and hotpacks. Received one dose of Clindamycin. Up ad tu. Tolerating regular diet. Voiding without difficulty. Showered. Breastfeeding independently, but no latch observed. Encouraged pt to call when breastfeeding so latch can be observed and assistance can be provided.  at bedside and supportive. Bonding well with baby. Continue cares.

## 2019-01-18 NOTE — PROGRESS NOTES
Labor Progress Note    Awilda Campos      MRN: [id number]   Age: 33 year old YOB: 1985   Date of Admission: 1/16/2019    Subjective:  Feeling her contractions more though hasn't noticed any fluid leaking since AROM.     Objective:  Patient Vitals for the past 24 hrs:   BP Temp Temp src Pulse Resp SpO2   01/17/19 2130 -- -- -- -- -- 96 %   01/17/19 2100 -- 97.9  F (36.6  C) Oral -- 20 95 %   01/17/19 2050 -- -- -- -- -- 96 %   01/17/19 2030 114/58 -- -- -- 18 95 %   01/17/19 2000 103/60 98.1  F (36.7  C) Oral -- 20 95 %   01/17/19 1930 106/56 -- -- -- -- 96 %   01/17/19 1900 128/73 98.3  F (36.8  C) Oral 79 20 97 %   01/17/19 1830 112/67 -- -- -- -- 95 %   01/17/19 1800 114/68 97.9  F (36.6  C) Oral -- 20 95 %   01/17/19 1754 110/65 -- -- -- -- 96 %   01/17/19 1752 108/68 -- -- -- -- --   01/17/19 1750 115/69 -- -- -- -- 94 %   01/17/19 1748 113/67 -- -- -- -- --   01/17/19 1746 113/67 -- -- -- -- --   01/17/19 1745 121/69 -- -- -- -- 96 %   01/17/19 1742 111/67 -- -- -- -- --   01/17/19 1740 112/68 -- -- -- -- --   01/17/19 1738 112/66 -- -- -- -- --   01/17/19 1736 107/65 -- -- -- -- --   01/17/19 1734 108/64 -- -- -- -- 97 %   01/17/19 1732 107/58 -- -- -- -- --   01/17/19 1730 105/59 -- -- -- -- 97 %   01/17/19 1728 103/61 -- -- -- -- 98 %   01/17/19 1725 106/62 -- -- -- -- 98 %   01/17/19 1724 122/71 -- -- -- -- --   01/17/19 1723 125/72 -- -- -- -- --   01/17/19 1721 123/78 -- -- -- -- --   01/17/19 1535 136/81 98.2  F (36.8  C) Oral -- 20 --   01/17/19 1322 110/63 98.6  F (37  C) Oral -- 16 --   01/17/19 0950 115/63 -- -- -- 16 --   01/17/19 0800 116/65 98.8  F (37.1  C) Oral -- 16 --   01/17/19 0440 118/61 98.8  F (37.1  C) Oral -- 18 --   01/17/19 0100 124/61 98.2  F (36.8  C) Axillary -- 20 --       FHT: , moderate variability, accels, no decels  [place]: 6 contractions/10 minutes    CVX: 5/75/-2 AROM clear, blood-tinged fluid      Assessment/Plan:  Ms. Awilda Campos is a 33 year old   at 40w5d  admitted for IOL for gestational HTN.    #) Labor:   S/p PV Misoprostol x3 for cervical ripening, Cook catheter, AROM. Continue pitocin as per protocol, now at 12ml/hr.  - SVE in 2 hrs or PRN     #) FWB:   Generally category I, reactive. Continue EFM and toco. Cephalic BSUS. EFW 8.5#  GBS neg     #) GHTN:   BP's normotensive currently. HELLP labs wnl on admit. Single mild range BP.  Continue to monitor.      #) PNC:   Rh pos, rubella immune     #) Hashimoto thyroiditis  - PTA levothyroxine and liothryonine     #) Asthma:  - PTA albuterol inhaler ordered  - avoid hemabate for postpartum hemorrhage       Kimberly French MD PGY1  OB/GYN  2019 10:10 PM

## 2019-01-18 NOTE — PLAN OF CARE
Data: Vital signs within normal limits; temp 99F. Postpartum checks within normal limits - see flow record. Patient eating and drinking normally. Patient able to empty bladder after delivery per labor RN. Treating possible endometritis w/ IV clindamycin and gentamycin, started upon transfer to [facility].  Patient breastfeeding, per labor RN baby fed well right before transfer. Patient  with first child.   Action: Patient denied pain. Oriented  to surroundings but was unable to provide education on folder and booklet.   Response: Support persons Pollo present.   Plan: continue plan of care.

## 2019-01-18 NOTE — PROGRESS NOTES
Labor Progress Note    Awilda Campos      MRN: [id number]   Age: 33 year old YOB: 1985   Date of Admission: 1/16/2019    Subjective:  Feeling very uncomfortable with contractions, lots of pelvic pressure    Objective:  Patient Vitals for the past 24 hrs:   BP Temp Temp src Pulse Heart Rate Resp SpO2   01/17/19 2305 -- 99  F (37.2  C) Oral -- -- -- 96 %   01/17/19 2230 -- -- -- -- -- -- 96 %   01/17/19 2203 110/59 98  F (36.7  C) Oral -- 83 20 97 %   01/17/19 2130 -- -- -- -- -- -- 96 %   01/17/19 2100 -- 97.9  F (36.6  C) Oral -- -- 20 95 %   01/17/19 2050 -- -- -- -- -- -- 96 %   01/17/19 2030 114/58 -- -- -- -- 18 95 %   01/17/19 2000 103/60 98.1  F (36.7  C) Oral -- -- 20 95 %   01/17/19 1930 106/56 -- -- -- -- -- 96 %   01/17/19 1900 128/73 98.3  F (36.8  C) Oral 79 -- 20 97 %   01/17/19 1830 112/67 -- -- -- -- -- 95 %   01/17/19 1800 114/68 97.9  F (36.6  C) Oral -- -- 20 95 %   01/17/19 1754 110/65 -- -- -- -- -- 96 %   01/17/19 1752 108/68 -- -- -- -- -- --   01/17/19 1750 115/69 -- -- -- -- -- 94 %   01/17/19 1748 113/67 -- -- -- -- -- --   01/17/19 1746 113/67 -- -- -- -- -- --   01/17/19 1745 121/69 -- -- -- -- -- 96 %   01/17/19 1742 111/67 -- -- -- -- -- --   01/17/19 1740 112/68 -- -- -- -- -- --   01/17/19 1738 112/66 -- -- -- -- -- --   01/17/19 1736 107/65 -- -- -- -- -- --   01/17/19 1734 108/64 -- -- -- -- -- 97 %   01/17/19 1732 107/58 -- -- -- -- -- --   01/17/19 1730 105/59 -- -- -- -- -- 97 %   01/17/19 1728 103/61 -- -- -- -- -- 98 %   01/17/19 1725 106/62 -- -- -- -- -- 98 %   01/17/19 1724 122/71 -- -- -- -- -- --   01/17/19 1723 125/72 -- -- -- -- -- --   01/17/19 1721 123/78 -- -- -- -- -- --   01/17/19 1535 136/81 98.2  F (36.8  C) Oral -- -- 20 --   01/17/19 1322 110/63 98.6  F (37  C) Oral -- -- 16 --   01/17/19 0950 115/63 -- -- -- -- 16 --   01/17/19 0800 116/65 98.8  F (37.1  C) Oral -- -- 16 --   01/17/19 0440 118/61 98.8  F (37.1  C) Oral -- -- 18 --   01/17/19 0100  124/61 98.2  F (36.8  C) Axillary -- -- 20 --       FHT: , moderate variability, accels, no decels  [place]: 4 contractions/10 minutes    CVX: /-1       Assessment/Plan:  Ms. Awilda Campos is a 33 year old  at 40w5d  admitted for IOL for gestational HTN.    #) Labor:   S/p PV Misoprostol x3 for cervical ripening, Cook catheter, AROM. Continue pitocin as per protocol, now at 12ml/hr. Progressing well in active labor.  - SVE PRN     #) FWB:   Generally category I, reactive. Continue EFM and toco. Cephalic BSUS. EFW 8.5#  GBS neg     #) GHTN:   BP's normotensive currently. HELLP labs wnl on admit. Single mild range BP.  Continue to monitor.      #) PNC:   Rh pos, rubella immune     #) Hashimoto thyroiditis  - PTA levothyroxine and liothryonine     #) Asthma:  - PTA albuterol inhaler ordered  - avoid hemabate for postpartum hemorrhage       Kimberly French MD PGY1  OB/GYN  2019 10:10 PM

## 2019-01-19 VITALS
TEMPERATURE: 98.4 F | DIASTOLIC BLOOD PRESSURE: 70 MMHG | RESPIRATION RATE: 16 BRPM | HEART RATE: 80 BPM | OXYGEN SATURATION: 97 % | SYSTOLIC BLOOD PRESSURE: 118 MMHG

## 2019-01-19 LAB — HGB BLD-MCNC: 11.4 G/DL (ref 11.7–15.7)

## 2019-01-19 PROCEDURE — 25000132 ZZH RX MED GY IP 250 OP 250 PS 637: Performed by: OBSTETRICS & GYNECOLOGY

## 2019-01-19 PROCEDURE — 36415 COLL VENOUS BLD VENIPUNCTURE: CPT | Performed by: STUDENT IN AN ORGANIZED HEALTH CARE EDUCATION/TRAINING PROGRAM

## 2019-01-19 PROCEDURE — 85018 HEMOGLOBIN: CPT | Performed by: STUDENT IN AN ORGANIZED HEALTH CARE EDUCATION/TRAINING PROGRAM

## 2019-01-19 PROCEDURE — 25000132 ZZH RX MED GY IP 250 OP 250 PS 637: Performed by: STUDENT IN AN ORGANIZED HEALTH CARE EDUCATION/TRAINING PROGRAM

## 2019-01-19 PROCEDURE — 25000128 H RX IP 250 OP 636: Performed by: STUDENT IN AN ORGANIZED HEALTH CARE EDUCATION/TRAINING PROGRAM

## 2019-01-19 RX ADMIN — GENTAMICIN SULFATE 350 MG: 40 INJECTION, SOLUTION INTRAMUSCULAR; INTRAVENOUS at 06:20

## 2019-01-19 RX ADMIN — STANDARDIZED SENNA CONCENTRATE AND DOCUSATE SODIUM 2 TABLET: 8.6; 5 TABLET, FILM COATED ORAL at 08:53

## 2019-01-19 RX ADMIN — IBUPROFEN 600 MG: 600 TABLET ORAL at 04:27

## 2019-01-19 RX ADMIN — LEVOTHYROXINE SODIUM 88 MCG: 88 TABLET ORAL at 09:49

## 2019-01-19 RX ADMIN — ACETAMINOPHEN 650 MG: 325 TABLET, FILM COATED ORAL at 08:53

## 2019-01-19 RX ADMIN — LIOTHYRONINE SODIUM 5 MCG: 5 TABLET ORAL at 09:48

## 2019-01-19 RX ADMIN — ACETAMINOPHEN 650 MG: 325 TABLET, FILM COATED ORAL at 04:27

## 2019-01-19 NOTE — PROGRESS NOTES
Post Partum Progress Note  PPD#1    Subjective:  She is resting comfortably in bed this morning. She denies any complaints this morning. Has not had any fevers since yesterday morning. Pain is improving and well controlled on current medication regimen. She is tolerating PO intake. Lochia present and less than a period.  She is voiding without difficulty. She has passed flatus and has not had a BM. She is ambulating without dizziness or difficulty.  She denies headache, changes in vision, nausea/vomiting, chest pain, shortness of breath, RUQ pain, or worsening edema.  Plans to breast feed.    Objective:  Vitals:    19 0810 19 1158 19 1611 19 2343   BP: 111/67 117/75 128/74 123/74   BP Location:  Left arm     Pulse:  80 80    Resp: 16 18 16 16   Temp: 97.6  F (36.4  C) 98.2  F (36.8  C) 97.4  F (36.3  C) 98  F (36.7  C)   TempSrc: Axillary Oral Oral Oral   SpO2: 96% 97%         General: NAD, resting comfortably, eating breakfasat  CV: Regular rate, well perfused.   Pulm: Normal respiratory effort.  Abd: Soft, non-tender, non-distended. Fundus is firm and at the umbilicus.    Ext: 1+ lower extremity edema bilaterally. No calf tenderness.    Assessment/Plan:  Awilda Campos is a 33 year old  female who is PPD#1 s/p  after IOL for gHTN.    Endometritis: Tmax/Tlast 100.6 (0200)/100.5 (0300), on PPD#0  - gent and clinda 24 hours afebrile, finishing last dose now    Hypothyroid:   - Continue PTA levothyroxine & liothyronine    Asthma:   - PTA albuterol    - Encourage routine post-operative goals including ambulation and incentive spirometry  - PNC: Rh +. Rubella immune. No intervention indicated.  - Pain: controlled on oral medications  - Heme: Hgb 11.8>>AM Hgb pending.  If <10 will discharge home with iron.  - GI: continue anti-emetics and stool softeners as needed.  - : Voiding spontaneously.  - Infant: Stable at bedside  - Feeding: Plans on breastfeeding.  - BC: Plans on IUD at  8 weeks    Discharge to home on PPD#1-2    # Pain Assessment:  Current Pain Score 1/18/2019   Patient currently in pain? denies   Pain descriptors -   Awilda moreno pain level was assessed and she currently denies pain.      Amarilys Weir MD  Obstetrics and Gyncology, PGY-1  January 19, 2019 , 6:28 AM     I, Casandra Sanchez MD, personally saw and evaluated this patient.  I discussed the patient with the resident and care team, and agree with the assessment and plan of care as documented in the resident's note of 01/19/19.  I personally reviewed vital signs, medications, lab, and exam.     Key Findings:  Meeting goals for discharge.  Abdomen non-tender, fundus firm.      PLAN:  Discharge home.  She will stay in the room after discharge if baby cannot go home.      Casandra Sanchez MD   Date of Service (when I saw the patient) 01/19/19   '

## 2019-01-19 NOTE — PLAN OF CARE
Data: Vital signs within normal limits. Postpartum checks within normal limits - see flow record. Patient eating and drinking normally. Patient able to empty bladder independently and is up ambulating. Patient performing self cares and is able to care for infant.  Action: Patient medicated during the shift for perineum soreness.   Response: Positive attachment behaviors observed with infant.  present and supportive.   Will continue to monitor and provide support.

## 2019-01-19 NOTE — PLAN OF CARE
VSS. Complaining of itchy rash on abdomen. Decline need for applying anti itch medication/lotion on the area. Reports that the rash is improving. Patient taking care of self and infant independently. Breastfeeding independently with minimal assist for deeper latch. Postpartum checks wnl. Pt educated on discharge instructions and given written handout, verbalized understanding of instructions.  Patient discharged to home with infant and spouse.

## 2019-01-19 NOTE — DISCHARGE INSTRUCTIONS
Postpartum Vaginal Delivery Instructions    Activity       Ask family and friends for help when you need it.    Do not place anything in your vagina for 6 weeks.    You are not restricted on other activities, but take it easy for a few weeks to allow your body to recover from delivery.  You are able to do any activities you feel up to that point.    No driving until you have stopped taking your pain medications (usually two weeks after delivery).     Call your health care provider if you have any of these symptoms:       Increased pain, swelling, redness, or fluid around your stiches from an episiotomy or perineal tear.    A fever above 100.4 F (38 C) with or without chills when placing a thermometer under your tongue.    You soak a sanitary pad with blood within 1 hour, or you see blood clots larger than a golf ball.    Bleeding that lasts more than 6 weeks.    Vaginal discharge that smells bad.    Severe pain, cramping or tenderness in your lower belly area.    A need to urinate more frequently (use the toilet more often), more urgently (use the toilet very quickly), or it burns when you urinate.    Nausea and vomiting.    Redness, swelling or pain around a vein in your leg.    Problems breastfeeding or a red or painful area on your breast.    Chest pain and cough or are gasping for air.    Problems coping with sadness, anxiety, or depression.  If you have any concerns about hurting yourself or the baby, call your provider immediately.     You have questions or concerns after you return home.     Keep your hands clean:  Always wash your hands before touching your perineal area and stitches.  This helps reduce your risk of infection.  If your hands aren't dirty, you may use an alcohol hand-rub to clean your hands. Keep your nails clean and short.      Preeclampsia   Call your doctor right away if you have any of the following:  - Edema (swelling) in your face or hands  - Rapid weight gain-about 1 pound or more in a  day  - Headache  - Abdominal pain on your right side  - Vision problems (flashes or spots)  - You have questions or concerns once you return home.

## 2019-01-19 NOTE — PLAN OF CARE
Data: Vital signs within normal limits. Postpartum checks within normal limits - see flow record. Patient eating and drinking normally. Patient able to empty bladder independently and is up ambulating. Patient performing self cares and is able to care for infant. Breastfeeding independently, latched checked.   Action: Pain has been adequately managed with oral pain medications. Patient also using hot packs for abdominal cramping.  Response: Positive attachment behaviors observed with infant. Support person, spouse: Pollo, present.   Plan: Continue with the plan of care.

## 2019-01-20 ENCOUNTER — DOCUMENTATION ONLY (OUTPATIENT)
Dept: OBGYN | Facility: CLINIC | Age: 34
End: 2019-01-20

## 2019-01-20 NOTE — PROGRESS NOTES
[facility] will be sharing updates with you on Maternal Child Health Referral requests for home care services.  This is for care coordination purposes and alert you to referral status.  We received the referral for  Awilda Campos; MRN [id number] and want to update you:    Home visit for postpartum/  assessment and education offered to patient.  Patient declined need for home care nurse visit.  Advised to follow up with Primary Care Providers for mom and baby.      Sincerely [facility]  Jerod Jiang  [phone]

## 2019-02-11 PROBLEM — M54.50 LOWER BACK PAIN: Status: RESOLVED | Noted: 2018-10-16 | Resolved: 2018-10-24

## 2019-02-11 NOTE — PROGRESS NOTES
Awilda Campos was seen 2 times for evaluation and treatment.  Patient did not return for further treatment and current status is unknown.  Due to short treatment duration, no objective or functional changes were made.  Please see goal flow sheet from episode noted date below and initial evaluation for further information.    No linked episodes

## 2019-03-06 PROBLEM — Z34.80 ENCOUNTER FOR SUPERVISION OF OTHER NORMAL PREGNANCY, UNSPECIFIED TRIMESTER: Status: RESOLVED | Noted: 2018-06-22 | Resolved: 2019-03-06

## 2019-03-07 ENCOUNTER — PRENATAL OFFICE VISIT (OUTPATIENT)
Dept: OBGYN | Facility: CLINIC | Age: 34
End: 2019-03-07
Payer: COMMERCIAL

## 2019-03-07 VITALS
DIASTOLIC BLOOD PRESSURE: 78 MMHG | BODY MASS INDEX: 29.07 KG/M2 | HEART RATE: 79 BPM | SYSTOLIC BLOOD PRESSURE: 112 MMHG | WEIGHT: 174.7 LBS

## 2019-03-07 DIAGNOSIS — N89.8 VAGINAL DISCHARGE: Primary | ICD-10-CM

## 2019-03-07 DIAGNOSIS — B37.31 YEAST INFECTION OF THE VAGINA: ICD-10-CM

## 2019-03-07 DIAGNOSIS — B96.89 BV (BACTERIAL VAGINOSIS): ICD-10-CM

## 2019-03-07 DIAGNOSIS — N76.0 BV (BACTERIAL VAGINOSIS): ICD-10-CM

## 2019-03-07 DIAGNOSIS — Z12.4 CERVICAL CANCER SCREENING: ICD-10-CM

## 2019-03-07 PROBLEM — Z23 NEED FOR TDAP VACCINATION: Status: RESOLVED | Noted: 2018-06-13 | Resolved: 2019-03-07

## 2019-03-07 PROBLEM — Z34.90 TERM PREGNANCY: Status: RESOLVED | Noted: 2019-01-16 | Resolved: 2019-03-07

## 2019-03-07 PROBLEM — Z36.89 ENCOUNTER FOR TRIAGE IN PREGNANT PATIENT: Status: RESOLVED | Noted: 2018-10-30 | Resolved: 2019-03-07

## 2019-03-07 PROBLEM — O44.02 PLACENTA PREVIA IN SECOND TRIMESTER: Status: RESOLVED | Noted: 2018-08-22 | Resolved: 2019-03-07

## 2019-03-07 LAB
SPECIMEN SOURCE: ABNORMAL
WET PREP SPEC: ABNORMAL

## 2019-03-07 PROCEDURE — 87210 SMEAR WET MOUNT SALINE/INK: CPT | Performed by: OBSTETRICS & GYNECOLOGY

## 2019-03-07 PROCEDURE — G0145 SCR C/V CYTO,THINLAYER,RESCR: HCPCS | Performed by: OBSTETRICS & GYNECOLOGY

## 2019-03-07 PROCEDURE — 99207 ZZC POST PARTUM EXAM: CPT | Performed by: OBSTETRICS & GYNECOLOGY

## 2019-03-07 RX ORDER — HYDROCORTISONE VALERATE 2 MG/G
OINTMENT TOPICAL 2 TIMES DAILY PRN
Qty: 60 G | Refills: 0 | Status: SHIPPED | OUTPATIENT
Start: 2019-03-07 | End: 2019-11-12

## 2019-03-07 RX ORDER — FLUCONAZOLE 150 MG/1
150 TABLET ORAL DAILY
Qty: 4 TABLET | Refills: 0 | Status: SHIPPED | OUTPATIENT
Start: 2019-03-07 | End: 2019-03-11

## 2019-03-07 RX ORDER — METRONIDAZOLE 7.5 MG/G
1 GEL VAGINAL DAILY
Qty: 35 G | Refills: 0 | Status: SHIPPED | OUTPATIENT
Start: 2019-03-07 | End: 2019-03-14

## 2019-03-07 ASSESSMENT — ANXIETY QUESTIONNAIRES
1. FEELING NERVOUS, ANXIOUS, OR ON EDGE: NOT AT ALL
6. BECOMING EASILY ANNOYED OR IRRITABLE: SEVERAL DAYS
2. NOT BEING ABLE TO STOP OR CONTROL WORRYING: NOT AT ALL
GAD7 TOTAL SCORE: 1
5. BEING SO RESTLESS THAT IT IS HARD TO SIT STILL: NOT AT ALL
3. WORRYING TOO MUCH ABOUT DIFFERENT THINGS: NOT AT ALL
7. FEELING AFRAID AS IF SOMETHING AWFUL MIGHT HAPPEN: NOT AT ALL

## 2019-03-07 ASSESSMENT — PATIENT HEALTH QUESTIONNAIRE - PHQ9
5. POOR APPETITE OR OVEREATING: NOT AT ALL
SUM OF ALL RESPONSES TO PHQ QUESTIONS 1-9: 3

## 2019-03-07 NOTE — PROGRESS NOTES
Awilda is here for a 6-week postpartum checkup.    She had a  of a viable boy, weight 7 pounds 13 oz., with no complications.  Since delivery, she has been breast feeding.  She has no signs of infection, bleeding or other complications.  She is not pregnant.  We discussed contraceptions and she is unsure.  She used a mirena IUD previously and is considering another, but wondering what other options she has.  She denies feeling sad, depressed or too overwhelmed.    She noticed before and then during pregnancy, and it has continued, that she has periodic anal itching.  It comes and goes, she thought it was a hemorrhoid, but nothing there.  It does sometimes go onto the perineum and vulva and well.    EXAM:    HEENT: grossly normal.  NECK: no lymphadenopathy or thyroidomegaly.  LUNGS: CTA X 2, no rales or crackles.  BACK: No spinal or CVA tenderness.  HEART: RRR without murmurs clicks or gallops.  ABDOMEN: soft, non tender, good bowel sounds, without masses rebound, guarding or tenderness.    PELVIC:    External genitalia: normal without lesion, repair well healed. Minimal erythema of vulva and perineum, but erythema and excoriation of anus, appears keritonized.                         Vagina: normal mucosa and rugae, no discharge.  Cervix: multiparous, well healed, without lesion.  Uterus: non pregnant in size, firm , mobile, no lesions.  Adnexa: non tender, without masses    EXTREMITIES: Warm to touch, good pulses, no ankle edema or calf tenderness.  NEUROLOGIC: grossly normal.    Wet prep: clue and yeast    ASSESSMENT:   Normal 6-week postpartum exam after . BV and yeast    PLAN:  Pap smear done and discussed options for contraception including minipill, IUD, nexplanon, depo provera.  I explained she could use regular OCP/nuvaring but try to avoid during breastfeeding so it doesn't affect supply.  She will think about it and RTC for iud or nexplanon, otherwise call for minipill.    Will give metrogel and  diflucan to treat bv and yeast.  Will also do topical steroid to help with immediate itching.    GINA OSULLIVAN MD

## 2019-03-07 NOTE — NURSING NOTE
"Chief Complaint   Patient presents with     Post Partum Exam       Initial /78   Pulse 79   Wt 79.2 kg (174 lb 11.2 oz)   Breastfeeding? Yes   BMI 29.07 kg/m   Estimated body mass index is 29.07 kg/m  as calculated from the following:    Height as of 18: 1.651 m (5' 5\").    Weight as of this encounter: 79.2 kg (174 lb 11.2 oz).  BP completed using cuff size: regular    Questioned patient about current smoking habits.  Pt. has never smoked.          The following HM Due: pap smear      The following patient reported/Care Every where data was sent to:  P ABSTRACT QUALITY INITIATIVES [[id number]]        patient has appointment for today  Tonya Hamilton                "

## 2019-03-07 NOTE — LETTER
March 15, 2019    Awilda Campos  [address] [address]    Dear ,  This letter is regarding your recent Pap smear (cervical cancer screening) and Human Papillomavirus (HPV) test.  We are happy to inform you that your Pap smear result is normal. Cervical cancer is closely linked with certain types of HPV. Your results showed no evidence of high-risk HPV.  Therefore we recommend you return in 5 years for your next pap smear and HPV test.  You will still need to return to the clinic every year for an annual exam and other preventive tests.  If you have additional questions regarding this result, please call our registered nurse, Nona at [phone].  Sincerely,    Kim Giraldo MD/[facility]

## 2019-03-08 PROCEDURE — 87624 HPV HI-RISK TYP POOLED RSLT: CPT | Performed by: OBSTETRICS & GYNECOLOGY

## 2019-03-08 ASSESSMENT — ANXIETY QUESTIONNAIRES: GAD7 TOTAL SCORE: 1

## 2019-03-13 LAB
COPATH REPORT: NORMAL
PAP: NORMAL

## 2019-03-14 LAB
FINAL DIAGNOSIS: NORMAL
HPV HR 12 DNA CVX QL NAA+PROBE: NEGATIVE
HPV16 DNA SPEC QL NAA+PROBE: NEGATIVE
HPV18 DNA SPEC QL NAA+PROBE: NEGATIVE
SPECIMEN DESCRIPTION: NORMAL
SPECIMEN SOURCE CVX/VAG CYTO: NORMAL

## 2019-04-23 DIAGNOSIS — E06.3 HASHIMOTO'S THYROIDITIS: ICD-10-CM

## 2019-04-23 RX ORDER — LEVOTHYROXINE SODIUM 88 UG/1
88 CAPSULE ORAL DAILY
Qty: 90 CAPSULE | Refills: 0 | Status: SHIPPED | OUTPATIENT
Start: 2019-04-23 | End: 2019-04-25

## 2019-04-23 NOTE — TELEPHONE ENCOUNTER
Temporary refill given, she needs to see her PCP for further mgmt.  I also placed an order for TSH and she can come in and get that so they know if dose should be changed.  Future dosing/refills will need to be done with PCP or previously prescribing MD. Thanks    GINA OSULLIVAN MD

## 2019-04-23 NOTE — TELEPHONE ENCOUNTER
Pending Prescriptions:                       Disp   Refills    levothyroxine (TIROSINT) 88 MCG capsule   90 cap*1            Sig: Take 88 mcg by mouth daily    Last TSH was 9/2018 and was normal. Your note said for her to see an FP for management, which she has not done. Also has not had any repeat TSH (unsure if this is needed or not). Okay to refill?  Agatha Coffey

## 2019-04-25 ENCOUNTER — OFFICE VISIT (OUTPATIENT)
Dept: FAMILY MEDICINE | Facility: CLINIC | Age: 34
End: 2019-04-25
Payer: COMMERCIAL

## 2019-04-25 VITALS
WEIGHT: 170.6 LBS | SYSTOLIC BLOOD PRESSURE: 115 MMHG | RESPIRATION RATE: 14 BRPM | OXYGEN SATURATION: 99 % | BODY MASS INDEX: 28.42 KG/M2 | HEIGHT: 65 IN | TEMPERATURE: 98.2 F | HEART RATE: 74 BPM | DIASTOLIC BLOOD PRESSURE: 78 MMHG

## 2019-04-25 DIAGNOSIS — E06.3 HASHIMOTO'S THYROIDITIS: ICD-10-CM

## 2019-04-25 PROCEDURE — 36415 COLL VENOUS BLD VENIPUNCTURE: CPT | Performed by: FAMILY MEDICINE

## 2019-04-25 PROCEDURE — 99213 OFFICE O/P EST LOW 20 MIN: CPT | Performed by: FAMILY MEDICINE

## 2019-04-25 PROCEDURE — 84481 FREE ASSAY (FT-3): CPT | Performed by: FAMILY MEDICINE

## 2019-04-25 PROCEDURE — 84443 ASSAY THYROID STIM HORMONE: CPT | Performed by: FAMILY MEDICINE

## 2019-04-25 RX ORDER — LIOTHYRONINE SODIUM 5 UG/1
5 TABLET ORAL DAILY
Qty: 90 TABLET | Refills: 3 | Status: SHIPPED | OUTPATIENT
Start: 2019-04-25 | End: 2019-09-26

## 2019-04-25 RX ORDER — LEVOTHYROXINE SODIUM 88 UG/1
88 CAPSULE ORAL DAILY
Qty: 90 CAPSULE | Refills: 3 | Status: SHIPPED | OUTPATIENT
Start: 2019-04-25 | End: 2019-10-01

## 2019-04-25 ASSESSMENT — MIFFLIN-ST. JEOR: SCORE: 1479.72

## 2019-04-25 NOTE — PROGRESS NOTES
.  SUBJECTIVE:   Awilda Campos is a 34 year old female who presents to clinic today for the following   health issues:    New patient/ establish care.       Medication Followup of  Thyroid medication     Taking Medication as prescribed: yes    Side Effects:  None    Medication Helping Symptoms:  yes     OB gave partical fill on thyroid medication. Patient is needing to establish primary care.   Has had hashimoto's thyroiditis with her first pregnancy  Has been on replacement the past few years  Due for refills and labs  Has had recent pregnancy - 3 children. Has been able to lose weight, no skin hair or nail changes as noted in the past. Good energy, no stool changes          Additional history: as documented    Reviewed  and updated as needed this visit by clinical staff  Tobacco  Allergies  Meds         Reviewed and updated as needed this visit by Provider         Patient Active Problem List   Diagnosis     CARDIOVASCULAR SCREENING; LDL GOAL LESS THAN 160     Hashimoto's thyroiditis     Mild intermittent asthma without complication     Past Surgical History:   Procedure Laterality Date     NO HISTORY OF SURGERY         Social History     Tobacco Use     Smoking status: Never Smoker     Smokeless tobacco: Never Used   Substance Use Topics     Alcohol use: No     Comment: very infrequently     Family History   Problem Relation Age of Onset     Breast Cancer Mother      Hyperlipidemia Mother      Asthma Brother         sports induced asthma     Arthritis Paternal Grandmother      Diabetes Maternal Grandfather      Arthritis Father      Eye Disorder Maternal Grandmother         cataracts     Gastrointestinal Disease Other         self, hx of stomach issues     Heart Disease Paternal Grandfather         pace maker     Osteoporosis Paternal Grandmother          Current Outpatient Medications   Medication Sig Dispense Refill     albuterol (PROAIR HFA/PROVENTIL HFA/VENTOLIN HFA) 108 (90 Base) MCG/ACT Inhaler Inhale 2  "puffs into the lungs every 6 hours as needed for shortness of breath / dyspnea or wheezing 1 Inhaler 1     Cholecalciferol (VITAMIN D-3 PO)        hydrocortisone valerate (WEST-SHO) 0.2 % external ointment Apply topically 2 times daily as needed (itching) 60 g 0     levothyroxine (TIROSINT) 88 MCG capsule Take 88 mcg by mouth daily 90 capsule 3     liothyronine (CYTOMEL) 5 MCG tablet Take 1 tablet (5 mcg) by mouth daily 90 tablet 3     Prenatal-FeFum-FA-DHA w/o A (PRENATAL + DHA PO)        Labs reviewed in EPIC    ROS:  Constitutional, HEENT, cardiovascular, pulmonary, gi and gu systems are negative, except as otherwise noted.    OBJECTIVE:                                                    /78   Pulse 74   Temp 98.2  F (36.8  C) (Oral)   Resp 14   Ht 1.651 m (5' 5\")   Wt 77.4 kg (170 lb 9.6 oz)   SpO2 99%   BMI 28.39 kg/m    Body mass index is 28.39 kg/m .  GENERAL APPEARANCE: healthy, alert and no distress  Neck: thyroid midline no masses no LAD  RESP: lungs clear to auscultation - no rales, rhonchi or wheezes  CV: regular rates and rhythm, normal S1 S2, no S3 or S4 and no murmur, click or rub    Diagnostic test results:  Diagnostic Test Results:  Labs are pending     ASSESSMENT/PLAN:                                                    1. Hashimoto's thyroiditis    - TSH with free T4 reflex  - levothyroxine (TIROSINT) 88 MCG capsule; Take 88 mcg by mouth daily  Dispense: 90 capsule; Refill: 3  - liothyronine (CYTOMEL) 5 MCG tablet; Take 1 tablet (5 mcg) by mouth daily  Dispense: 90 tablet; Refill: 3      Follow up with Provider - will contact with results     Zhane Mckenzie MD  [facility]        "

## 2019-04-26 LAB
T3FREE SERPL-MCNC: 3 PG/ML (ref 2.3–4.2)
TSH SERPL DL<=0.005 MIU/L-ACNC: 1.8 MU/L (ref 0.4–4)

## 2019-09-03 DIAGNOSIS — E06.3 HASHIMOTO'S THYROIDITIS: ICD-10-CM

## 2019-09-03 NOTE — TELEPHONE ENCOUNTER
Pending Prescriptions:                       Disp   Refills    levothyroxine (TIROSINT) 88 MCG capsule   90 cap*3            Sig: Take 88 mcg by mouth daily    Last appt with FP for this - will send to them to refill if appropriate.  Agatha Coffey RN

## 2019-09-03 NOTE — TELEPHONE ENCOUNTER
"levothyroxine (TIROSINT) 88 MCG capsule  Last Written Prescription Date:  04/25/2019  Last Fill Quantity: 90,  # refills: 3   Last office visit: 3/7/2019 with prescribing provider:     Future Office Visit:  Nothing at this time scheduled.    Requested Prescriptions   Pending Prescriptions Disp Refills     levothyroxine (TIROSINT) 88 MCG capsule 90 capsule 3     Sig: Take 88 mcg by mouth daily       Thyroid Protocol Failed - 9/3/2019 10:20 AM        Failed - Recent (12 mo) or future (30 days) visit within the authorizing provider's specialty     Patient had office visit in the last 12 months or has a visit in the next 30 days with authorizing provider or within the authorizing provider's specialty.  See \"Patient Info\" tab in inbasket, or \"Choose Columns\" in Meds & Orders section of the refill encounter.              Passed - Patient is 12 years or older        Passed - Medication is active on med list        Passed - Normal TSH on file in past 12 months     Recent Labs   Lab Test 04/25/19  1508   TSH 1.80              Passed - No active pregnancy on record     If patient is pregnant or has had a positive pregnancy test, please check TSH.          Passed - No positive pregnancy test in past 12 months     If patient is pregnant or has had a positive pregnancy test, please check TSH.          "

## 2019-09-04 RX ORDER — LEVOTHYROXINE SODIUM 88 UG/1
88 CAPSULE ORAL DAILY
Refills: 0
Start: 2019-09-04

## 2019-09-18 ENCOUNTER — TELEPHONE (OUTPATIENT)
Dept: FAMILY MEDICINE | Facility: CLINIC | Age: 34
End: 2019-09-18

## 2019-09-18 DIAGNOSIS — E06.3 HASHIMOTO'S THYROIDITIS: ICD-10-CM

## 2019-09-19 RX ORDER — LEVOTHYROXINE SODIUM 88 UG/1
TABLET ORAL
Start: 2019-09-19

## 2019-09-19 NOTE — TELEPHONE ENCOUNTER
"Denied  Too early; Rx sent 4/25/2019 for 1 year  Kayce VALDERRAMA RN    Last Written Prescription Date:  4/25/2019  Last Fill Quantity: 90,  # refills: 3   Last office visit: 3/7/2019 with prescribing provider:     Future Office Visit:   Next 5 appointments (look out 90 days)    Sep 26, 2019  3:30 PM CDT  Office Visit with Zhane Mckenzie MD  [facility] [address] [address] [phone]         Requested Prescriptions   Pending Prescriptions Disp Refills     levothyroxine (SYNTHROID/LEVOTHROID) 88 MCG tablet [Pharmacy Med Name: LEVOTHYROXINE 0.088MG (88MCG) TAB] 90 tablet 0     Sig: TAKE 1 CAPSULE BY MOUTH DAILY       Thyroid Protocol Passed - 9/18/2019  4:36 PM        Passed - Patient is 12 years or older        Passed - Recent (12 mo) or future (30 days) visit within the authorizing provider's specialty     Patient had office visit in the last 12 months or has a visit in the next 30 days with authorizing provider or within the authorizing provider's specialty.  See \"Patient Info\" tab in inbasket, or \"Choose Columns\" in Meds & Orders section of the refill encounter.              Passed - Medication is active on med list        Passed - Normal TSH on file in past 12 months     Recent Labs   Lab Test 04/25/19  1508   TSH 1.80              Passed - No active pregnancy on record     If patient is pregnant or has had a positive pregnancy test, please check TSH.          Passed - No positive pregnancy test in past 12 months     If patient is pregnant or has had a positive pregnancy test, please check TSH.          "

## 2019-09-25 NOTE — PROGRESS NOTES
Subjective     Awilda Campos is a 34 year old female who presents to clinic today for the following health issues:    HPI   Hypothyroidism Follow-up      Since last visit, patient describes the following symptoms: dry skin and loose stools        How many servings of fruits and vegetables do you eat daily?  4 or more    On average, how many sweetened beverages do you drink each day (soda, juice, sweet tea, etc)?   0-2  How many days per week do you miss taking your medication? 8 days     What makes it hard for you to take your medications?  not having medication         ABDOMINAL   PAIN     Onset: off and on for years but bad until the last 2 months     Description:   Character: Cramping  Location: epigastric region  Radiation: None    Intensity: moderate    Progression of Symptoms:  worsening and intermittent    Accompanying Signs & Symptoms:  Fever/Chills?: no   Gas/Bloating: YES  Nausea: no   Vomitting: no   Diarrhea?: YES  Constipation:no   Dysuria or Hematuria: no    History:   Trauma: no   Previous similar pain: YES- in the past    Previous tests done: none    Precipitating factors:   Does the pain change with:     Food: no      BM: no, it can be painful     Urination: no     Alleviating factors:    None     Therapies Tried and outcome: tums     LMP:  09/15/2019         Patient Active Problem List   Diagnosis     CARDIOVASCULAR SCREENING; LDL GOAL LESS THAN 160     Hashimoto's thyroiditis     Mild intermittent asthma without complication     Past Surgical History:   Procedure Laterality Date     NO HISTORY OF SURGERY         Social History     Tobacco Use     Smoking status: Never Smoker     Smokeless tobacco: Never Used   Substance Use Topics     Alcohol use: No     Comment: very infrequently     Family History   Problem Relation Age of Onset     Breast Cancer Mother      Hyperlipidemia Mother      Asthma Brother         sports induced asthma     Arthritis Paternal Grandmother      Diabetes Maternal  "Grandfather      Arthritis Father      Eye Disorder Maternal Grandmother         cataracts     Gastrointestinal Disease Other         self, hx of stomach issues     Heart Disease Paternal Grandfather         pace maker     Osteoporosis Paternal Grandmother          Current Outpatient Medications   Medication Sig Dispense Refill     albuterol (PROAIR HFA/PROVENTIL HFA/VENTOLIN HFA) 108 (90 Base) MCG/ACT Inhaler Inhale 2 puffs into the lungs every 6 hours as needed for shortness of breath / dyspnea or wheezing 1 Inhaler 1     Cholecalciferol (VITAMIN D-3 PO)        hydrocortisone valerate (WEST-SHO) 0.2 % external ointment Apply topically 2 times daily as needed (itching) 60 g 0     liothyronine (CYTOMEL) 5 MCG tablet Take 1 tablet (5 mcg) by mouth daily 90 tablet 3     Prenatal-FeFum-FA-DHA w/o A (PRENATAL + DHA PO)        levothyroxine (TIROSINT) 88 MCG capsule Take 88 mcg by mouth daily 90 capsule 3       Reviewed and updated as needed this visit by Provider         Review of Systems   ROS COMP: Constitutional, HEENT, cardiovascular, pulmonary, gi and gu systems are negative, except as otherwise noted.      Objective    /75   Pulse 77   Temp 98.1  F (36.7  C) (Oral)   Resp 16   Ht 1.651 m (5' 5\")   Wt 76.7 kg (169 lb)   LMP 09/15/2019   SpO2 97%   BMI 28.12 kg/m    Body mass index is 28.12 kg/m .  Physical Exam   GENERAL: healthy, alert and no distress  HENT: ear canals and TM's normal, nose and mouth without ulcers or lesions  ABDOMEN: soft, nontender, no hepatosplenomegaly, no masses and bowel sounds normal    Diagnostic Test Results:  Labs reviewed in Epic  Results for orders placed or performed in visit on 09/26/19   TSH with free T4 reflex   Result Value Ref Range    TSH 1.76 0.40 - 4.00 mU/L   T3, Free   Result Value Ref Range    Free T3 2.2 (L) 2.3 - 4.2 pg/mL           Assessment & Plan     1. Hashimoto's thyroiditis  Stable and would continue medications at the current dose.  - liothyronine " "(CYTOMEL) 5 MCG tablet; Take 1 tablet (5 mcg) by mouth daily  Dispense: 90 tablet; Refill: 3  - TSH with free T4 reflex  - T3, Free  2.  Abdominal pain possibly stratus or irritable bowel.  Could be related to food intolerance.  Discussed certain foods to try eliminating such as fermentable FODMAP foods.  Certainly if she has weight loss blood in her stool fevers or any other warning signs would be seen urgently.  Consider imaging  BMI:   Estimated body mass index is 28.12 kg/m  as calculated from the following:    Height as of this encounter: 1.651 m (5' 5\").    Weight as of this encounter: 76.7 kg (169 lb).           Return for routine physical as needed    No follow-ups on file.    Zhane Mckenzie MD  [facility]      "

## 2019-09-26 ENCOUNTER — OFFICE VISIT (OUTPATIENT)
Dept: FAMILY MEDICINE | Facility: CLINIC | Age: 34
End: 2019-09-26
Payer: COMMERCIAL

## 2019-09-26 VITALS
HEIGHT: 65 IN | DIASTOLIC BLOOD PRESSURE: 75 MMHG | BODY MASS INDEX: 28.16 KG/M2 | TEMPERATURE: 98.1 F | WEIGHT: 169 LBS | RESPIRATION RATE: 16 BRPM | HEART RATE: 77 BPM | SYSTOLIC BLOOD PRESSURE: 106 MMHG | OXYGEN SATURATION: 97 %

## 2019-09-26 DIAGNOSIS — E06.3 HASHIMOTO'S THYROIDITIS: ICD-10-CM

## 2019-09-26 PROCEDURE — 84481 FREE ASSAY (FT-3): CPT | Performed by: FAMILY MEDICINE

## 2019-09-26 PROCEDURE — 99214 OFFICE O/P EST MOD 30 MIN: CPT | Performed by: FAMILY MEDICINE

## 2019-09-26 PROCEDURE — 84443 ASSAY THYROID STIM HORMONE: CPT | Performed by: FAMILY MEDICINE

## 2019-09-26 PROCEDURE — 36415 COLL VENOUS BLD VENIPUNCTURE: CPT | Performed by: FAMILY MEDICINE

## 2019-09-26 RX ORDER — LIOTHYRONINE SODIUM 5 UG/1
5 TABLET ORAL DAILY
Qty: 90 TABLET | Refills: 3 | Status: SHIPPED | OUTPATIENT
Start: 2019-09-26 | End: 2020-01-02 | Stop reason: ALTCHOICE

## 2019-09-26 ASSESSMENT — MIFFLIN-ST. JEOR: SCORE: 1472.46

## 2019-09-26 NOTE — NURSING NOTE
"Chief Complaint   Patient presents with     Thyroid Disease     Abdominal Pain     Derm Problem     /75   Pulse 77   Temp 98.1  F (36.7  C) (Oral)   Resp 16   Ht 1.651 m (5' 5\")   Wt 76.7 kg (169 lb)   LMP 09/15/2019   SpO2 97%   BMI 28.12 kg/m   Estimated body mass index is 28.12 kg/m  as calculated from the following:    Height as of this encounter: 1.651 m (5' 5\").    Weight as of this encounter: 76.7 kg (169 lb).  bp completed using cuff size: regular       Health Maintenance addressed:  ACT    Possibly completing today per provider review.    Concha Bailon, ALEJANDRO, MA     "

## 2019-09-27 LAB
T3FREE SERPL-MCNC: 2.2 PG/ML (ref 2.3–4.2)
TSH SERPL DL<=0.005 MIU/L-ACNC: 1.76 MU/L (ref 0.4–4)

## 2019-10-01 ENCOUNTER — TELEPHONE (OUTPATIENT)
Dept: FAMILY MEDICINE | Facility: CLINIC | Age: 34
End: 2019-10-01

## 2019-10-01 DIAGNOSIS — E06.3 HASHIMOTO'S THYROIDITIS: ICD-10-CM

## 2019-10-01 RX ORDER — LEVOTHYROXINE SODIUM 88 UG/1
88 CAPSULE ORAL DAILY
Qty: 90 CAPSULE | Refills: 3 | Status: SHIPPED | OUTPATIENT
Start: 2019-10-01 | End: 2020-01-02 | Stop reason: DRUGHIGH

## 2019-10-01 NOTE — TELEPHONE ENCOUNTER
Prescription approved per [facility] Refill Protocol.  Kayce VALDERRAMA RN

## 2019-10-01 NOTE — TELEPHONE ENCOUNTER
Reason for Call:  Medication or medication refill:    Do you use a [facility]?  Name of the pharmacy and phone number for the current request:    [id number] - [place] [id number] [place] [facility] [place] [address]      Name of the medication requested:   levothyroxine (TIROSINT) 88 MCG capsule 90 capsule 3 4/25/2019  No   Sig - Route: Take 88 mcg by mouth daily - Oral   Sent to pharmacy as: levothyroxine (TIROSINT) 88 MCG capsule   Class: E-Prescribe   Order: [id number]         Other request:   Please refax this to the pharmacy.  They have no record of this rx.       Call taken on 10/1/2019 at 4:18 PM by Rand Esposito

## 2019-10-02 ENCOUNTER — HEALTH MAINTENANCE LETTER (OUTPATIENT)
Age: 34
End: 2019-10-02

## 2019-10-21 ENCOUNTER — TELEPHONE (OUTPATIENT)
Dept: ENDOCRINOLOGY | Facility: CLINIC | Age: 34
End: 2019-10-21

## 2019-10-21 NOTE — TELEPHONE ENCOUNTER
[facility] Call Center    Phone Message    May a detailed message be left on voicemail: yes    Reason for Call: Other: new PT referral for endo - Hashimoto's thyroiditis     Action Taken: Message routed to:  [facility] endo

## 2019-10-23 ENCOUNTER — IMMUNIZATION (OUTPATIENT)
Dept: NURSING | Facility: CLINIC | Age: 34
End: 2019-10-23
Payer: COMMERCIAL

## 2019-10-23 DIAGNOSIS — Z53.9 ERRONEOUS ENCOUNTER--DISREGARD: Primary | ICD-10-CM

## 2019-10-23 NOTE — TELEPHONE ENCOUNTER
[facility] Call Center    Phone Message    May a detailed message be left on voicemail: yes    Reason for Call: Other: Following up on referral to endocrine for Hashimoto thyroiditis     Action Taken: Message routed to:  [facility] Endocrine

## 2019-10-25 NOTE — RESULT ENCOUNTER NOTE
Hello,    Great news, your results were normal. The tsh is excellent and the T3 is jut at the low end of normal.  I susy recommend rechecking these in 3 months to see if there is a trend.    Zhane Mckenzie MD

## 2019-11-08 NOTE — PROGRESS NOTES
SUBJECTIVE:                                                   Awilda Campos is a 34 year old female who presents to clinic today for the following health issue(s):  Patient presents with:  Vaginal Problem: has a long history of vaginal itching, denies discharge.      HPI:  Patient reports with vaginal itching.  She denies any exposure to STDs.  She denies any vaginal discharge.  Patient has not tried any medication for this.  She has had no fevers or chills.    Patient has had 2 previous vaginal deliveries.  The first delivery was vacuum-assisted with significant tear.  Patient states her second delivery was uncomplicated.  She is not currently breast-feeding.    Patient's last menstrual period was 2019..     Patient is sexually active, .  Using none for contraception.    reports that she has never smoked. She has never used smokeless tobacco.    STD testing offered?  Declined    Health maintenance updated:  yes    Today's PHQ-2 Score: No flowsheet data found.  Today's PHQ-9 Score:   PHQ-9 SCORE 2019   PHQ-9 Total Score 13     Today's ROWAN-7 Score:   ROWAN-7 SCORE 2019   Total Score 4       Problem list and histories reviewed & adjusted, as indicated.  Additional history: as documented.    Patient Active Problem List   Diagnosis     CARDIOVASCULAR SCREENING; LDL GOAL LESS THAN 160     Hashimoto's thyroiditis     Mild intermittent asthma without complication     Past Surgical History:   Procedure Laterality Date     NO HISTORY OF SURGERY        Social History     Tobacco Use     Smoking status: Never Smoker     Smokeless tobacco: Never Used   Substance Use Topics     Alcohol use: Yes     Comment: very infrequently      Problem (# of Occurrences) Relation (Name,Age of Onset)    Arthritis (2) Father, Paternal Grandmother    Asthma (1) Brother: sports induced asthma    Breast Cancer (1) Mother (40)    Diabetes (1) Maternal Grandfather    Eye Disorder (1) Maternal Grandmother: cataracts     "Gastrointestinal Disease (1) Other: self, hx of stomach issues    Heart Disease (1) Paternal Grandfather: pace maker    Hyperlipidemia (1) Mother    Hypertension (1) Father    Osteoporosis (1) Paternal Grandmother            Current Outpatient Medications   Medication Sig     Cholecalciferol (VITAMIN D-3 PO)      [START ON 11/13/2019] clobetasol (TEMOVATE) 0.05 % external cream Apply topically three times a week     levothyroxine (TIROSINT) 88 MCG capsule Take 88 mcg by mouth daily     liothyronine (CYTOMEL) 5 MCG tablet Take 1 tablet (5 mcg) by mouth daily     No current facility-administered medications for this visit.      Allergies   Allergen Reactions     No Known Allergies        ROS:  12 point review of systems negative other than symptoms noted below.  Gastrointestinal: Bloating and Diarrhea  Genitourinary: Cramps, Heavy Bleeding with Period, Significant PMS and Vaginal Itching  Skin: Rash and Skin Dryness  Endocrine: Cold Intolerance and Decreased Libido  Psychiatric: Anxiety and Araujo    OBJECTIVE:     /76   Pulse 68   Ht 1.651 m (5' 5\")   Wt 80.3 kg (177 lb)   LMP 11/01/2019   BMI 29.45 kg/m    Body mass index is 29.45 kg/m .    Exam:  Constitutional:  Appearance: Well nourished, well developed alert, in no acute distress  Chest:  Respiratory Effort:  Breathing unlabored.    Cardiovascular: no edema  Skin: General Inspection:  No rashes present, no lesions present, no areas of discoloration.  Neurologic:  Mental Status:  Oriented X3.    Psychiatric:  Mentation appears normal and affect normal/bright.  Pelvic Exam:  External Genitalia:  Left labia majora 1x3 cm area with lichen, perirectal/perineal lichen in appearance, whitening of the tissue.    Vagina:     Normal vaginal vault without central or paravaginal defects, no discharge present, no inflammatory lesions present, no masses present  Bladder:     Nontender to palpation  Urethra:   Urethral Body:  Urethra palpation normal, urethra " structural support normal   Urethral Meatus:  No erythema or lesions present  Cervix:     Appearance healthy, no lesions present, nontender to palpation, no bleeding present  Uterus:     Uterus: firm, normal sized and nontender, anteverted in position.   Adnexa:     No adnexal tenderness present, no adnexal masses present  Perineum:     Perineum within normal limits, no evidence of trauma, no rashes or skin lesions present  Anus:     Anus within normal limits, no hemorrhoids present  Inguinal Lymph Nodes:     No lymphadenopathy present  Pubic Hair:     Normal pubic hair distribution for age  Genitalia and Groin:     No rashes present, no lesions present, no areas of discoloration, no masses present       In-Clinic Test Results:  No results found for this or any previous visit (from the past 24 hour(s)).    ASSESSMENT/PLAN:                                                        ICD-10-CM    1. Atrophic vaginitis N95.2 Group B strep PCR     Gram stain     Wet prep     Yeast culture   2. Lichen sclerosus et atrophicus of the vulva N90.4 clobetasol (TEMOVATE) 0.05 % external cream       There are no Patient Instructions on file for this visit.    1. Lichen-discussed lichen sclerosus.  She will trial clobetasol 3 times weekly.  She is going to return in 3 weeks for assessment.  Discussed the waxing and waning course that lichen sclerosus at times has.  Discussed we potentially will take her off the clobetasol and then use it as recurrences happen.  Discussed her exam is very consistent with lichen sclerosis.  If the clobetasol does not work could always consider a biopsy.  2. Vaginitis- cultures obtained today as well    25 minutes was spent face to face with the patient today discussing her history, diagnosis, and follow-up plan as noted above. Over 50% of the visit was spent in counseling and coordination of care.        Katlyn Guerra MD  [facility]

## 2019-11-12 ENCOUNTER — OFFICE VISIT (OUTPATIENT)
Dept: OBGYN | Facility: CLINIC | Age: 34
End: 2019-11-12
Payer: COMMERCIAL

## 2019-11-12 VITALS
HEIGHT: 65 IN | SYSTOLIC BLOOD PRESSURE: 108 MMHG | HEART RATE: 68 BPM | DIASTOLIC BLOOD PRESSURE: 76 MMHG | WEIGHT: 177 LBS | BODY MASS INDEX: 29.49 KG/M2

## 2019-11-12 DIAGNOSIS — N90.4 LICHEN SCLEROSUS ET ATROPHICUS OF THE VULVA: ICD-10-CM

## 2019-11-12 DIAGNOSIS — N95.2 ATROPHIC VAGINITIS: Primary | ICD-10-CM

## 2019-11-12 LAB
GRAM STN SPEC: ABNORMAL
Lab: ABNORMAL
SPECIMEN SOURCE: ABNORMAL
SPECIMEN SOURCE: NORMAL
WET PREP SPEC: NORMAL

## 2019-11-12 PROCEDURE — 87653 STREP B DNA AMP PROBE: CPT | Performed by: OBSTETRICS & GYNECOLOGY

## 2019-11-12 PROCEDURE — 87102 FUNGUS ISOLATION CULTURE: CPT | Performed by: OBSTETRICS & GYNECOLOGY

## 2019-11-12 PROCEDURE — 87210 SMEAR WET MOUNT SALINE/INK: CPT | Performed by: OBSTETRICS & GYNECOLOGY

## 2019-11-12 PROCEDURE — 87205 SMEAR GRAM STAIN: CPT | Performed by: OBSTETRICS & GYNECOLOGY

## 2019-11-12 PROCEDURE — 99214 OFFICE O/P EST MOD 30 MIN: CPT | Performed by: OBSTETRICS & GYNECOLOGY

## 2019-11-12 RX ORDER — CLOBETASOL PROPIONATE 0.5 MG/G
CREAM TOPICAL
Qty: 32 G | Refills: 3 | Status: SHIPPED | OUTPATIENT
Start: 2019-11-13 | End: 2021-05-19

## 2019-11-12 ASSESSMENT — ANXIETY QUESTIONNAIRES
1. FEELING NERVOUS, ANXIOUS, OR ON EDGE: SEVERAL DAYS
2. NOT BEING ABLE TO STOP OR CONTROL WORRYING: NOT AT ALL
6. BECOMING EASILY ANNOYED OR IRRITABLE: MORE THAN HALF THE DAYS
5. BEING SO RESTLESS THAT IT IS HARD TO SIT STILL: NOT AT ALL
IF YOU CHECKED OFF ANY PROBLEMS ON THIS QUESTIONNAIRE, HOW DIFFICULT HAVE THESE PROBLEMS MADE IT FOR YOU TO DO YOUR WORK, TAKE CARE OF THINGS AT HOME, OR GET ALONG WITH OTHER PEOPLE: SOMEWHAT DIFFICULT
7. FEELING AFRAID AS IF SOMETHING AWFUL MIGHT HAPPEN: NOT AT ALL
3. WORRYING TOO MUCH ABOUT DIFFERENT THINGS: NOT AT ALL
GAD7 TOTAL SCORE: 4

## 2019-11-12 ASSESSMENT — PATIENT HEALTH QUESTIONNAIRE - PHQ9
5. POOR APPETITE OR OVEREATING: SEVERAL DAYS
SUM OF ALL RESPONSES TO PHQ QUESTIONS 1-9: 13

## 2019-11-12 ASSESSMENT — MIFFLIN-ST. JEOR: SCORE: 1508.75

## 2019-11-13 LAB
GP B STREP DNA SPEC QL NAA+PROBE: NEGATIVE
SPECIMEN SOURCE: NORMAL

## 2019-11-13 ASSESSMENT — ANXIETY QUESTIONNAIRES: GAD7 TOTAL SCORE: 4

## 2019-11-18 ENCOUNTER — TELEPHONE (OUTPATIENT)
Dept: OBGYN | Facility: CLINIC | Age: 34
End: 2019-11-18

## 2019-11-18 LAB
Lab: NORMAL
SPECIMEN SOURCE: NORMAL
YEAST SPEC QL CULT: NORMAL

## 2019-11-18 NOTE — TELEPHONE ENCOUNTER
Symptoms have decreased a lot since starting the medication. Informed of Dr. Guerra's recommendations.  Will continue treatment and keep followup appointment that is scheduled.  Susan Alexander RN on 11/18/2019 at 1:51 PM

## 2019-12-05 NOTE — PROGRESS NOTES
SUBJECTIVE:                                                   Awilda Campos is a 34 year old female who presents to clinic today for the following health issue(s):  Patient presents with:  Medication Follow-up: started using clobetasol daily for lichen sclerosus       HPI:  Patient presenting for clobetasol follow up.  Patient with improved symptoms.  No pruritis.  No discharge.  No fevers or chills.    Patient's last menstrual period was 2019..     Patient is sexually active, .  Using nothing for contraception.    reports that she has never smoked. She has never used smokeless tobacco.  Health maintenance updated:  yes    Today's PHQ-9 Score:   PHQ-9 SCORE 2019   PHQ-9 Total Score 13     Today's ROWAN-7 Score:   ROWAN-7 SCORE 2019   Total Score 4       Problem list and histories reviewed & adjusted, as indicated.  Additional history: as documented.    Patient Active Problem List   Diagnosis     CARDIOVASCULAR SCREENING; LDL GOAL LESS THAN 160     Hashimoto's thyroiditis     Mild intermittent asthma without complication     Past Surgical History:   Procedure Laterality Date     NO HISTORY OF SURGERY        Social History     Tobacco Use     Smoking status: Never Smoker     Smokeless tobacco: Never Used   Substance Use Topics     Alcohol use: Yes     Comment: very infrequently      Problem (# of Occurrences) Relation (Name,Age of Onset)    Arthritis (2) Father, Paternal Grandmother    Asthma (1) Brother: sports induced asthma    Breast Cancer (1) Mother (40)    Diabetes (1) Maternal Grandfather    Eye Disorder (1) Maternal Grandmother: cataracts    Gastrointestinal Disease (1) Other: self, hx of stomach issues    Heart Disease (1) Paternal Grandfather: pace maker    Hyperlipidemia (1) Mother    Hypertension (1) Father    Osteoporosis (1) Paternal Grandmother            Current Outpatient Medications   Medication Sig     Cholecalciferol (VITAMIN D-3 PO)      clobetasol (TEMOVATE) 0.05 %  "external cream Apply topically three times a week     levothyroxine (TIROSINT) 88 MCG capsule Take 88 mcg by mouth daily     liothyronine (CYTOMEL) 5 MCG tablet Take 1 tablet (5 mcg) by mouth daily     No current facility-administered medications for this visit.      Allergies   Allergen Reactions     No Known Allergies        ROS:  12 point review of systems negative other than symptoms noted below or in the HPI.  No urinary frequency or dysuria, bladder or kidney problems      OBJECTIVE:     BP 98/70   Ht 1.651 m (5' 5\")   Wt 79.4 kg (175 lb)   LMP 11/24/2019   BMI 29.12 kg/m    Body mass index is 29.12 kg/m .    Exam:  Constitutional:  Appearance: Well nourished, well developed alert, in no acute distress  Chest:  Respiratory Effort:  Breathing unlabored.   Cardiovascular: no edema  Skin: General Inspection:  No rashes present, no lesions present, no areas of discoloration.  Neurologic:  Mental Status:  Oriented X3.  Normal strength and tone, sensory exam grossly normal, mentation intact and speech normal.    Psychiatric:  Mentation appears normal and affect normal/bright.   GYN: no lesions, no excoriations    In-Clinic Test Results:  No results found for this or any previous visit (from the past 24 hour(s)).    ASSESSMENT/PLAN:                                                        ICD-10-CM    1. Lichen sclerosus et atrophicus of the vulva N90.4        There are no Patient Instructions on file for this visit.    1. Wean the clobetasol  2. She will watch symptoms  3. Follow up as needed    Katlyn Guerra MD  [facility]  "

## 2019-12-06 ENCOUNTER — OFFICE VISIT (OUTPATIENT)
Dept: OBGYN | Facility: CLINIC | Age: 34
End: 2019-12-06
Payer: COMMERCIAL

## 2019-12-06 VITALS
WEIGHT: 175 LBS | DIASTOLIC BLOOD PRESSURE: 70 MMHG | BODY MASS INDEX: 29.16 KG/M2 | HEIGHT: 65 IN | SYSTOLIC BLOOD PRESSURE: 98 MMHG

## 2019-12-06 DIAGNOSIS — N90.4 LICHEN SCLEROSUS ET ATROPHICUS OF THE VULVA: Primary | ICD-10-CM

## 2019-12-06 PROCEDURE — 99212 OFFICE O/P EST SF 10 MIN: CPT | Performed by: OBSTETRICS & GYNECOLOGY

## 2019-12-06 ASSESSMENT — MIFFLIN-ST. JEOR: SCORE: 1499.67

## 2020-01-02 ENCOUNTER — PRE VISIT (OUTPATIENT)
Dept: ENDOCRINOLOGY | Facility: CLINIC | Age: 35
End: 2020-01-02

## 2020-01-02 ENCOUNTER — OFFICE VISIT (OUTPATIENT)
Dept: ENDOCRINOLOGY | Facility: CLINIC | Age: 35
End: 2020-01-02
Payer: COMMERCIAL

## 2020-01-02 VITALS
DIASTOLIC BLOOD PRESSURE: 74 MMHG | SYSTOLIC BLOOD PRESSURE: 115 MMHG | HEART RATE: 90 BPM | OXYGEN SATURATION: 98 % | BODY MASS INDEX: 29.74 KG/M2 | HEIGHT: 65 IN | WEIGHT: 178.5 LBS

## 2020-01-02 DIAGNOSIS — E06.3 HASHIMOTO'S THYROIDITIS: Primary | ICD-10-CM

## 2020-01-02 DIAGNOSIS — R14.0 ABDOMINAL BLOATING: ICD-10-CM

## 2020-01-02 DIAGNOSIS — E06.3 HASHIMOTO'S THYROIDITIS: ICD-10-CM

## 2020-01-02 LAB
T3 SERPL-MCNC: 120 NG/DL (ref 60–181)
T4 FREE SERPL-MCNC: 0.85 NG/DL (ref 0.76–1.46)
TSH SERPL DL<=0.005 MIU/L-ACNC: 1.88 MU/L (ref 0.4–4)
TSH SERPL DL<=0.005 MIU/L-ACNC: 1.88 MU/L (ref 0.4–4)

## 2020-01-02 RX ORDER — LEVOTHYROXINE SODIUM 100 UG/1
100 TABLET ORAL DAILY
Qty: 90 TABLET | Refills: 3 | Status: SHIPPED | OUTPATIENT
Start: 2020-01-02 | End: 2020-08-25 | Stop reason: DRUGHIGH

## 2020-01-02 ASSESSMENT — PAIN SCALES - GENERAL: PAINLEVEL: NO PAIN (0)

## 2020-01-02 ASSESSMENT — MIFFLIN-ST. JEOR: SCORE: 1515.55

## 2020-01-02 NOTE — NURSING NOTE
Chief Complaint   Patient presents with     Consult     Hashimoto's Thyroiditis     Carli Bang MA

## 2020-01-02 NOTE — LETTER
1/2/2020       RE: Awilda Campos  [address] [place]  [facility] [address]     Dear Colleague,    Thank you for referring your patient, Awilda Campos, to the [facility] ENDOCRINOLOGY at [facility]. Please see a copy of my visit note below.         Endocrinology Note         Awilda is a 34 year old female presents today for Hashimoto's thyroiditis    HPI  Awilda is a 34 year old female presents today for Hashimoto's thyroiditis.    She was diagnosed with Hashimoto thyroiditis during her first pregnancy about 4 years ago.  She was initiated on levothyroxine during pregnancy and discontinued after the first pregnancy.  In 2017 before her second pregnancy, she was tested positive for TPO and antithyroglobulin antibody and TSH was 11.  She was then started on thyroid medication.  She stated she was started on both levothyroxine and liothyronine.  She does not know why she was started on both medication at the same time.    She moved to [place] in June 2018 and gave birth to her second child in January 2019.  She stated over the past 6 months she has been feeling more fatigue, sleeps a lot about 8 to 9 hours a day, abdominal bloating, inability to lose weight and brain fog.  She usually feels cold.she had regular menstrual period.  She stated she gained about 30 pounds since her first pregnancy and unable to lose.    Her baseline weight before pregnancy was 150 pound.  She stated her work is pretty active.  She exercise using elliptical about 1-3 times per week and yoga once a week.  Her diet is decent with a lot of fruits and some vegetable.  She usually cook at home.    Her current regimen include levothyroxine 88 mcg and liothyronine 5 mcg daily.    Past Medical History  Past Medical History:   Diagnosis Date     Asthma      Hashimoto's disease      Lichen sclerosus 11/2019     Uncomplicated asthma     Uses an inhaler as needed       Allergies  Allergies   Allergen Reactions      No Known Allergies      Medications  Current Outpatient Medications   Medication Sig Dispense Refill     Cholecalciferol (VITAMIN D-3 PO)        clobetasol (TEMOVATE) 0.05 % external cream Apply topically three times a week 32 g 3     levothyroxine (TIROSINT) 88 MCG capsule Take 88 mcg by mouth daily 90 capsule 3     liothyronine (CYTOMEL) 5 MCG tablet Take 1 tablet (5 mcg) by mouth daily 90 tablet 3     Family History  family history includes Arthritis in her father and paternal grandmother; Asthma in her brother; Breast Cancer (age of onset: 40) in her mother; Diabetes in her maternal grandfather; Eye Disorder in her maternal grandmother; Gastrointestinal Disease in an other family member; Heart Disease in her paternal grandfather; Hyperlipidemia in her mother; Hypertension in her father; Osteoporosis in her paternal grandmother.   Her father may have thyroid problem but is not on medication  No thyroid cancer in the family    Social History  Social History     Tobacco Use     Smoking status: Never Smoker     Smokeless tobacco: Never Used   Substance Use Topics     Alcohol use: Yes     Comment: very infrequently     Drug use: No   Drink alcohol once a week  No smoking  , has 2 children aged 4 and 1 years old    ROS  Constitutional: no weight change, good energy  Eyes: no vision change, diplopia or red eyes   Neck: no difficulty swallowing, no choking, no neck pain, no neck swelling  Cardiovascular: no chest pain, palpitations  Respiratory: no dyspnea, cough, shortness of breath or wheezing   GI: no nausea, vomiting, diarrhea or constipation, no abdominal pain   : no change in urine, no dysuria or hematuria  Musculoskeletal: no joint or muscle pain or swelling   Integumentary: no concerning lesions or moles   Neuro: no loss of strength or sensation, no numbness or tingling, no tremor, no dizziness, no headache   Endo: no polyuria or polydipsia, no temperature intolerance   Heme/Lymph: no concerning  "bumps, no bleeding problems   Allergy: no environmental allergies   Psych: no depression or anxiety, no sleep problems.    Physical Exam  /74   Pulse 90   Ht 1.651 m (5' 5\")   Wt 81 kg (178 lb 8 oz)   LMP 12/21/2019   SpO2 98%   BMI 29.70 kg/m     Body mass index is 29.7 kg/m .  Constitutional: no distress, comfortable, pleasant   Eyes: anicteric, normal extra-ocular movements, no lid lag or retraction  Neck: no thyromegaly, no discrete nodule  Cardiovascular: regular rate and rhythm, normal S1 and S2, no murmurs  Respiratory: clear to auscultation, no wheezes or crackles, normal breath sounds   Gastrointestinal:  nontender, no hepatosplenomegaly, no masses   Musculoskeletal: no edema   Skin: no concerning lesions, no jaundice   Neurological: cranial nerves intact, normal strength and sensation, reflexes at patella, normal gait, no tremor on outstretched hands bilaterally  Psychological: appropriate mood   Lymphatic: no cervical  lymphadenopathy.      RESULTS  I have personally reviewed labs and images. I also reviewed labs with patient and discussed the result and plan of care.          ASSESSMENT:    Awilda is a 34 year old female presents today for Hashimoto's thyroiditis.    1) Hashimoto thyroiditis: Diagnosed 4 years ago.  She had positive TPO and antithyroglobulin antibody.  Her symptoms got worse over the past 6 months after she gave birth to her second child.  Her symptoms include abdominal bloating, fatigue, excessive sleepiness, and weight gain.  I am not quite sure whether her symptoms are related to thyroid disease or not.  I discussed with her on using only levothyroxine as a treatment of Hashimoto thyroiditis.  She is okay with this approach.  We will start with rechecking TSH, free T4, and total T3 to adjust dose accordingly.    2) abdominal bloating: This is a concerning.  No other gastrointestinal symptoms.  I will check tissue transglutaminase antibody to rule out celiac disease.  I " advised her to try gluten-free diet.    PLAN:   -Check TSH, free T4, total T3 and tissue transglutaminase antibody today  -I will adjust dose accordingly    Addendum  Results for BIJAL HOLGUIN (MRN [id number]) as of 1/2/2020 16:55   Ref. Range 1/2/2020 08:53   T4 Free Latest Ref Range: 0.76 - 1.46 ng/dL 0.85   Triiodothyronine (T3) Latest Ref Range: 60 - 181 ng/dL 120   TSH Latest Ref Range: 0.40 - 4.00 mU/L 1.88     Will discontinue liothyronine  Will increase levothyroxine 100 mcg daily.  Repeat lab in 6-8 weeks    Moose Spears MD  Division of Diabetes and Endocrinology  Department of Medicine  [phone]

## 2020-01-03 LAB
TTG IGA SER-ACNC: 1 U/ML
TTG IGG SER-ACNC: 1 U/ML

## 2020-03-17 ENCOUNTER — MYC MEDICAL ADVICE (OUTPATIENT)
Dept: FAMILY MEDICINE | Facility: CLINIC | Age: 35
End: 2020-03-17

## 2020-03-17 DIAGNOSIS — J45.20 MILD INTERMITTENT ASTHMA WITHOUT COMPLICATION: ICD-10-CM

## 2020-03-17 NOTE — TELEPHONE ENCOUNTER
SN,    Please see LiveRam[employer] message below.  Albuterol inhaler not on current medication list.    Order T'd up from medication history list.  Last Rx 2018.  No ACT in chart.    Please advise.  Thanks,  Ana M Rosario RN

## 2020-03-18 RX ORDER — ALBUTEROL SULFATE 90 UG/1
2 AEROSOL, METERED RESPIRATORY (INHALATION) EVERY 6 HOURS PRN
Qty: 6.7 INHALER | Refills: 3 | Status: SHIPPED | OUTPATIENT
Start: 2020-03-18 | End: 2023-04-06

## 2020-07-20 ENCOUNTER — VIRTUAL VISIT (OUTPATIENT)
Dept: FAMILY MEDICINE | Facility: CLINIC | Age: 35
End: 2020-07-20
Payer: COMMERCIAL

## 2020-07-20 DIAGNOSIS — Z20.822 EXPOSURE TO COVID-19 VIRUS: ICD-10-CM

## 2020-07-20 DIAGNOSIS — J02.9 SORE THROAT: Primary | ICD-10-CM

## 2020-07-20 PROCEDURE — 99213 OFFICE O/P EST LOW 20 MIN: CPT | Mod: 95 | Performed by: NURSE PRACTITIONER

## 2020-07-20 NOTE — PATIENT INSTRUCTIONS
A COVID-19 test has been ordered for you. You will receive a call to schedule this.     You should self-isolate at home. You can stop self-isolation when all 3 of these are true:    You ve had no fever--and no medicine that reduces fever--for 3 full days (72 hours). And     Your symptoms are better, such as cough or trouble breathing. And     At least 10 days have passed since your symptoms first started.    Patient Education     Coronavirus Disease 2019 (COVID-19): Caring for Yourself or Others  If you or a household member have symptoms of COVID-19, follow the guidelines below. This will help you manage symptoms and keep the virus from spreading.  If you have symptoms of COVID-19    Stay home and contact your care team. They will tell you what to do.    Don t panic. Keep in mind that other illnesses can cause similar symptoms.    Stay away from work, school, and public places.    Limit physical contact with others in your home. Limit visitors. No kissing.    Clean surfaces you touch with disinfectant.    If you need to cough or sneeze, do it into a tissue. Then, throw the tissue into the trash. If you don't have tissues, cough or sneeze into the bend of your elbow    Don t share food or personal items with people in your household. This includes items like eating and drinking utensils, towels, and bedding.    Wear a cloth face mask around other people. It should cover your nose and mouth. You may need to make your own mask using a bandana, T-shirt, or other cloth. See the CDC s instructions on how to make a mask.    If you need to go to a hospital or clinic, call ahead to let them know. Expect the care team to wear masks, gowns, gloves, and eye protection. You may be put in a separate room.    Follow all instructions from your care team.  If you ve been diagnosed with COVID-19    Stay home and away from others, including other people in your home. (This is called self-isolation.) Don t leave home unless you need  to get medical care. Don't go to work, school, or public places. Don't use buses, taxis, or other public transportation.    Follow all instructions from your care team.    If you need to go to a hospital or clinic, call ahead to let them know. Expect the care team to wear masks, gowns, gloves, and eye protection. You may be put in a separate room.    Wear a face mask over your nose and mouth. This is to protect others from your germs. If you can t wear a mask, your caregivers should wear one. You may need to make your own mask using a bandana, T-shirt, or other cloth. See the CDC s instructions on how to make a mask.    Have no contact with pets and other animals.    Don t share food or personal items with people in your household. This includes items like eating and drinking utensils, towels, and bedding.    If you need to cough or sneeze, do it into a tissue. Then, throw the tissue into the trash. If you don't have tissues, cough or sneeze into the bend of your elbow.    Wash your hands often.  Self-care at home  At this time, there is no medicine approved to prevent or treat COVID-19. Experts are testing different medicines, trying to find one that works.  So far, the most proven treatment is to support your body while it fights the virus.    Get plenty of rest.    Drink extra fluids (6 to 8 glasses of liquids each day), unless a doctor has told you not to. Ask your care team which fluids are best for you. Avoid fluids that contain caffeine or alcohol.    Take over-the-counter (OTC) medicine to help reduce pain and fever. Your care team will tell you which OTC medicine to use.  If you ve been in the hospital for COVID-19, follow your care team s instructions. This may include changing positions to help your breathing (such as lying on your belly).  If a test showed that you have COVID-19, you may be asked to donate plasma after you ve recovered. (This is called COVID-19 convalescent plasma donation.) The plasma  may contain antibodies to help fight the virus in others. Scientists are testing whether this might be a treatment in the future. For more information, talk to your care team.  Caring for a sick person    Follow all instructions from the care team.    Wash your hands often.    Wear protective clothing as advised.    Make sure the sick person wears a mask. If they can't wear a mask, don't stay in the same room with them. If you must be in the same room, wear a face mask. Make sure the mask covers both the nose and mouth.    Keep track of the sick person s symptoms.    Clean surfaces often with disinfectant. This includes phones, kitchen counters, fridge door handles, bathroom surfaces, and others.    Don t let anyone share household items with the sick person. This includes eating and drinking tools, towels, sheets, and blankets.    Clean fabrics and laundry well.    Keep other people and pets away from the sick person.  When you can stop self-isolation  When you are sick with COVID-19, you should stay away from other people. This is called self-isolation. The rules for ending self-isolation depend on your health in general.  If you are normally healthy  You can stop self-isolation when all 3 of these are true:    You ve had no fever--and no medicine that reduces fever--for 3 full days (72 hours). And     Your symptoms are better, such as cough or trouble breathing. And     At least 10 days have passed since your symptoms first started.  Talk with your care team before you leave home. They may tell you it s okay to leave, or they may give you different advice.  If you have a weak immune system  If you re being treated for cancer, have an immune disorder (such as HIV), or have had a transplant (organ or bone marrow), follow your care team s instructions. You may be able to end self-isolation when all 3 of these are true:    You ve had no fever--and no medicine that reduces fever--for 3 full days (72 hours).  And     Your symptoms are better, such as cough or trouble breathing. And     You ve had 2 tests for COVID-19. The tests happened at least 24 hours apart, and both show that you don t have the virus. (If no tests are available, your care team may tell you to follow the rules for normally healthy people above.)  When to call your care team  Call your care team right away if a sick person has any of these:    Trouble breathing    Pain or pressure in the chest  If a sick person has any of these, call 911:    Trouble breathing that gets worse    Pain or pressure in the chest that gets worse    Blue tint to lips or face    Fast or irregular heartbeat    Confusion or trouble waking    Fainting or loss of consciousness    Coughing up blood  Going home from the hospital  If you have COVID-19 and were recently in the hospital:    Follow the instructions above for self-care and isolation.    Follow the hospital care team s instructions.    Ask questions if anything is unclear to you. Write down answers so you remember them.  Last modified date: 5/8/2020  This information has been modified by your health care provider with permission from the publisher.      [id number] [facility], [address] [place] [address], [place] [address]. All rights reserved. This information is not intended as a substitute for professional medical care. Always follow your healthcare professional's instructions. This information has been modified by your health care provider with permission from the publisher.

## 2020-07-20 NOTE — PROGRESS NOTES
"Awilda Campos is a 35 year old female who is being evaluated via a billable video visit.      The patient has been notified of following:     \"This video visit will be conducted via a call between you and your physician/provider. We have found that certain health care needs can be provided without the need for an in-person physical exam.  This service lets us provide the care you need with a video conversation.  If a prescription is necessary we can send it directly to your pharmacy.  If lab work is needed we can place an order for that and you can then stop by our lab to have the test done at a later time.    Video visits are billed at different rates depending on your insurance coverage.  Please reach out to your insurance provider with any questions.    If during the course of the call the physician/provider feels a video visit is not appropriate, you will not be charged for this service.\"    Patient has given verbal consent for Video visit? Yes  How would you like to obtain your AVS? MyChart  If you are dropped from the video visit, the video invite should be resent to: Text to cell phone: [phone]  Will anyone else be joining your video visit? Yes: [phone]. How would they like to receive their invitation? Text to cell phone: [phone]  Subjective     Awilda Campos is a 35 year old female who presents today via video visit for the following health issues:    HPI    Chief Complaint   Patient presents with     Covid 19 Testing     Exposure to Coronavirus     Sore throat 2 days fatigued    Video Start Time: 0847    Sore throat SYMPTOMS      Duration: 2 days    Description  sore throat and fatigue/malaise    Severity: mild    Accompanying signs and symptoms: None    History (predisposing factors):  Son's  had positive COVID-19 test last week and both her children are sick with coughs.     Precipitating or alleviating factors: None    Therapies tried and outcome:  none      Patient Active " Problem List   Diagnosis     CARDIOVASCULAR SCREENING; LDL GOAL LESS THAN 160     Hashimoto's thyroiditis     Mild intermittent asthma without complication     Past Surgical History:   Procedure Laterality Date     NO HISTORY OF SURGERY         Social History     Tobacco Use     Smoking status: Never Smoker     Smokeless tobacco: Never Used   Substance Use Topics     Alcohol use: Yes     Comment: very infrequently     Family History   Problem Relation Age of Onset     Breast Cancer Mother 40     Hyperlipidemia Mother      Arthritis Father      Hypertension Father      Asthma Brother         sports induced asthma     Eye Disorder Maternal Grandmother         cataracts     Diabetes Maternal Grandfather      Arthritis Paternal Grandmother      Osteoporosis Paternal Grandmother      Heart Disease Paternal Grandfather         pace maker     Gastrointestinal Disease Other         self, hx of stomach issues         Current Outpatient Medications   Medication Sig Dispense Refill     albuterol (PROAIR HFA/PROVENTIL HFA/VENTOLIN HFA) 108 (90 Base) MCG/ACT inhaler Inhale 2 puffs into the lungs every 6 hours as needed for shortness of breath / dyspnea or wheezing 6.7 Inhaler 3     Cholecalciferol (VITAMIN D-3 PO)        clobetasol (TEMOVATE) 0.05 % external cream Apply topically three times a week 32 g 3     levothyroxine (SYNTHROID/LEVOTHROID) 100 MCG tablet Take 1 tablet (100 mcg) by mouth daily 90 tablet 3     Allergies   Allergen Reactions     No Known Allergies        Reviewed and updated as needed this visit by Provider  Tobacco  Allergies  Meds  Problems  Med Hx  Surg Hx  Fam Hx         Review of Systems   Constitutional, HEENT, cardiovascular, pulmonary, gi and gu systems are negative, except as otherwise noted.      Objective       Physical Exam     GENERAL: Healthy, alert and no distress  EYES: Eyes grossly normal to inspection.  No discharge or erythema, or obvious scleral/conjunctival abnormalities.  RESP: No  audible wheeze, cough, or visible cyanosis.  No visible retractions or increased work of breathing.    SKIN: Visible skin clear. No significant rash, abnormal pigmentation or lesions.  NEURO: Cranial nerves grossly intact.  Mentation and speech appropriate for age.  PSYCH: Mentation appears normal, affect normal/bright, judgement and insight intact, normal speech and appearance well-groomed.      Diagnostic Test Results:  Labs reviewed in Epic  none         Assessment & Plan     1. Sore throat  - Symptomatic COVID-19 Virus (Coronavirus) by PCR; Future    2. Exposure to COVID-19 virus  - Symptomatic COVID-19 Virus (Coronavirus) by PCR; Future    Return if symptoms worsen or fail to improve.    SHILO Salgado CNP  [facility]      Video-Visit Details    Type of service:  Video Visit    Video End Time:0851    Originating Location (pt. Location): Home    Distant Location (provider location):  [facility]     Platform used for Video Visit: AmWell    Return if symptoms worsen or fail to improve.       SHILO Salgado CNP

## 2020-07-21 DIAGNOSIS — J02.9 SORE THROAT: ICD-10-CM

## 2020-07-21 DIAGNOSIS — Z20.822 EXPOSURE TO COVID-19 VIRUS: ICD-10-CM

## 2020-07-21 PROCEDURE — U0003 INFECTIOUS AGENT DETECTION BY NUCLEIC ACID (DNA OR RNA); SEVERE ACUTE RESPIRATORY SYNDROME CORONAVIRUS 2 (SARS-COV-2) (CORONAVIRUS DISEASE [COVID-19]), AMPLIFIED PROBE TECHNIQUE, MAKING USE OF HIGH THROUGHPUT TECHNOLOGIES AS DESCRIBED BY CMS-2020-01-R: HCPCS | Performed by: NURSE PRACTITIONER

## 2020-07-21 NOTE — LETTER
July 24, 2020        Awilda Campos  [address] [id number]    This letter provides a written record that you were tested for COVID-19 on /21/20.       Your result was negative. This means that we didn t find the virus that causes COVID-19 in your sample. A test may show negative when you do actually have the virus. This can happen when the virus is in the early stages of infection, before you feel illness symptoms.    If you have symptoms   Stay home and away from others (self-isolate) until you meet ALL of the guidelines below:    You ve had no fever--and no medicine that reduces fever--for 3 full days (72 hours). And      Your other symptoms have gotten better. For example, your cough or breathing has improved. And     At least 10 days have passed since your symptoms started.    During this time:    Stay home. Don t go to work, school or anywhere else.     Stay in your own room, including for meals. Use your own bathroom if you can.    Stay away from others in your home. No hugging, kissing or shaking hands. No visitors.    Clean  high touch  surfaces often (doorknobs, counters, handles, etc.). Use a household cleaning spray or wipes. You can find a full list on the EPA website at www.epa.gov/pesticide-registration/list-n-disinfectants-use-against-sars-cov-2.    Cover your mouth and nose with a mask, tissue or washcloth to avoid spreading germs.    Wash your hands and face often with soap and water.    Going back to work  Check with your employer for any guidelines to follow for going back to work.    Employers: This document serves as formal notice that your employee tested negative for COVID-19, as of the testing date shown above.

## 2020-07-22 LAB
SARS-COV-2 RNA SPEC QL NAA+PROBE: NOT DETECTED
SPECIMEN SOURCE: NORMAL

## 2020-08-06 ENCOUNTER — VIRTUAL VISIT (OUTPATIENT)
Dept: FAMILY MEDICINE | Facility: CLINIC | Age: 35
End: 2020-08-06
Payer: COMMERCIAL

## 2020-08-06 DIAGNOSIS — E06.3 HASHIMOTO'S THYROIDITIS: Primary | ICD-10-CM

## 2020-08-06 DIAGNOSIS — R53.83 FATIGUE, UNSPECIFIED TYPE: ICD-10-CM

## 2020-08-06 DIAGNOSIS — R41.840 CONCENTRATION DEFICIT: ICD-10-CM

## 2020-08-06 PROCEDURE — 99214 OFFICE O/P EST MOD 30 MIN: CPT | Mod: 95 | Performed by: FAMILY MEDICINE

## 2020-08-06 RX ORDER — BUPROPION HYDROCHLORIDE 150 MG/1
150 TABLET ORAL EVERY MORNING
Qty: 30 TABLET | Refills: 1 | Status: SHIPPED | OUTPATIENT
Start: 2020-08-06 | End: 2020-10-13

## 2020-08-06 ASSESSMENT — ANXIETY QUESTIONNAIRES
1. FEELING NERVOUS, ANXIOUS, OR ON EDGE: NEARLY EVERY DAY
5. BEING SO RESTLESS THAT IT IS HARD TO SIT STILL: NEARLY EVERY DAY
2. NOT BEING ABLE TO STOP OR CONTROL WORRYING: SEVERAL DAYS
3. WORRYING TOO MUCH ABOUT DIFFERENT THINGS: SEVERAL DAYS
GAD7 TOTAL SCORE: 14
7. FEELING AFRAID AS IF SOMETHING AWFUL MIGHT HAPPEN: NOT AT ALL
IF YOU CHECKED OFF ANY PROBLEMS ON THIS QUESTIONNAIRE, HOW DIFFICULT HAVE THESE PROBLEMS MADE IT FOR YOU TO DO YOUR WORK, TAKE CARE OF THINGS AT HOME, OR GET ALONG WITH OTHER PEOPLE: SOMEWHAT DIFFICULT
6. BECOMING EASILY ANNOYED OR IRRITABLE: NEARLY EVERY DAY

## 2020-08-06 ASSESSMENT — PATIENT HEALTH QUESTIONNAIRE - PHQ9: 5. POOR APPETITE OR OVEREATING: NEARLY EVERY DAY

## 2020-08-06 NOTE — PROGRESS NOTES
"Awilda Campos is a 35 year old female who is being evaluated via a billable video visit.      The patient has been notified of following:     \"This video visit will be conducted via a call between you and your physician/provider. We have found that certain health care needs can be provided without the need for an in-person physical exam.  This service lets us provide the care you need with a video conversation.  If a prescription is necessary we can send it directly to your pharmacy.  If lab work is needed we can place an order for that and you can then stop by our lab to have the test done at a later time.    Video visits are billed at different rates depending on your insurance coverage.  Please reach out to your insurance provider with any questions.    If during the course of the call the physician/provider feels a video visit is not appropriate, you will not be charged for this service.\"    Patient has given verbal consent for Video visit? Yes  How would you like to obtain your AVS? MyChart  If you are dropped from the video visit, the video invite should be resent to: Text to cell phone: [phone]  Will anyone else be joining your video visit? No    Subjective     Awilda Campos is a 35 year old female who presents today via video visit for the following health issues:    HPI    Thyroid issues and ADHD symptoms :    Asthma has been good - no concerns    Thyroid has not been \"great\" has felt lots of increased need for sleep     Weight gain bloating.    No matter what she eats.  Stools are several times per day and having Bm helps but does not relieve bloating  Has been tested for celiac - neg  Has tried gluten free for 30 days  Has tried avoiding dairy  No travel  Has not done stool studies    Feels really bothered by irritability poor attention  Has had this long time now feels this is heightened  Family history is not notable for concentration  School in the past was fine - no concerns  Is high " functioning  Has had longstanding issues since kindergarden but is able to overcome         Video Start Time: 3:30        Patient Active Problem List   Diagnosis     CARDIOVASCULAR SCREENING; LDL GOAL LESS THAN 160     Hashimoto's thyroiditis     Mild intermittent asthma without complication     Past Surgical History:   Procedure Laterality Date     NO HISTORY OF SURGERY         Social History     Tobacco Use     Smoking status: Never Smoker     Smokeless tobacco: Never Used   Substance Use Topics     Alcohol use: Yes     Comment: very infrequently     Family History   Problem Relation Age of Onset     Breast Cancer Mother 40     Hyperlipidemia Mother      Arthritis Father      Hypertension Father      Asthma Brother         sports induced asthma     Eye Disorder Maternal Grandmother         cataracts     Diabetes Maternal Grandfather      Arthritis Paternal Grandmother      Osteoporosis Paternal Grandmother      Heart Disease Paternal Grandfather         pace maker     Gastrointestinal Disease Other         self, hx of stomach issues         Current Outpatient Medications   Medication Sig Dispense Refill     albuterol (PROAIR HFA/PROVENTIL HFA/VENTOLIN HFA) 108 (90 Base) MCG/ACT inhaler Inhale 2 puffs into the lungs every 6 hours as needed for shortness of breath / dyspnea or wheezing 6.7 Inhaler 3     buPROPion (WELLBUTRIN XL) 150 MG 24 hr tablet Take 1 tablet (150 mg) by mouth every morning 30 tablet 1     Cholecalciferol (VITAMIN D-3 PO)        levothyroxine (SYNTHROID/LEVOTHROID) 100 MCG tablet Take 1 tablet (100 mcg) by mouth daily 90 tablet 3     Probiotic Product (PROBIOTIC DAILY PO)        clobetasol (TEMOVATE) 0.05 % external cream Apply topically three times a week (Patient not taking: Reported on 8/6/2020) 32 g 3       Reviewed and updated as needed this visit by Provider         Review of Systems   Constitutional, HEENT, cardiovascular, pulmonary, gi and gu systems are negative, except as otherwise  "noted.      Objective             Physical Exam     GENERAL: Healthy, alert and no distress  EYES: Eyes grossly normal to inspection.  No discharge or erythema, or obvious scleral/conjunctival abnormalities.  RESP: No audible wheeze, cough, or visible cyanosis.  No visible retractions or increased work of breathing.    SKIN: Visible skin clear. No significant rash, abnormal pigmentation or lesions.  NEURO: Cranial nerves grossly intact.  Mentation and speech appropriate for age.  PSYCH: Mentation appears normal, affect normal/bright, judgement and insight intact, normal speech and appearance well-groomed.      Diagnostic Test Results:  Labs reviewed in Epic        Assessment & Plan     1. Hashimoto's thyroiditis  Rechecking labs  - TSH; Future  - T4, free; Future    2. Fatigue, unspecified type  Bloating  Can do stool tests if nothing else is helping   labs and elimination diet  CMP  - CBC with platelets; Future  - **Vitamin B12 FUTURE 2mo; Future  - Iron and iron binding capacity; Future  - buPROPion (WELLBUTRIN XL) 150 MG 24 hr tablet; Take 1 tablet (150 mg) by mouth every morning  Dispense: 30 tablet; Refill: 1    3. Concentration deficit  Trial of wellbutrin - might help energy irritability and concentration    - buPROPion (WELLBUTRIN XL) 150 MG 24 hr tablet; Take 1 tablet (150 mg) by mouth every morning  Dispense: 30 tablet; Refill: 1     BMI:   Estimated body mass index is 29.7 kg/m  as calculated from the following:    Height as of 1/2/20: 1.651 m (5' 5\").    Weight as of 1/2/20: 81 kg (178 lb 8 oz).           Regular exercise  Recheck in 1 month    No follow-ups on file.    Zhane Mckenzie MD  [facility]      Video-Visit Details    Type of service:  Video Visit    Video End Time:3:45    Originating Location (pt. Location): Home    Distant Location (provider location):  [facility]     Platform used for Video Visit: Doximity    No follow-ups on file.       Zhane Mckenzie, " MD

## 2020-08-07 ASSESSMENT — ANXIETY QUESTIONNAIRES: GAD7 TOTAL SCORE: 14

## 2020-08-21 ENCOUNTER — TELEPHONE (OUTPATIENT)
Dept: FAMILY MEDICINE | Facility: CLINIC | Age: 35
End: 2020-08-21

## 2020-08-21 NOTE — TELEPHONE ENCOUNTER
Patient Quality Outreach      Summary:    Patient is due/failing the following:   ACT needed    Type of outreach:    Sent [employer] message.    Questions for provider review:    None                                                                                   **Start Working phrase here:**       Patient has the following on her problem list/HM:   Asthma review     No flowsheet data found.       Kayce VALDERRAMA RN

## 2020-08-24 DIAGNOSIS — E06.3 HASHIMOTO'S THYROIDITIS: ICD-10-CM

## 2020-08-24 DIAGNOSIS — R53.83 FATIGUE, UNSPECIFIED TYPE: ICD-10-CM

## 2020-08-24 LAB
ERYTHROCYTE [DISTWIDTH] IN BLOOD BY AUTOMATED COUNT: 13.4 % (ref 10–15)
HCT VFR BLD AUTO: 40 % (ref 35–47)
HGB BLD-MCNC: 13.7 G/DL (ref 11.7–15.7)
MCH RBC QN AUTO: 31.2 PG (ref 26.5–33)
MCHC RBC AUTO-ENTMCNC: 34.3 G/DL (ref 31.5–36.5)
MCV RBC AUTO: 91 FL (ref 78–100)
PLATELET # BLD AUTO: 253 10E9/L (ref 150–450)
RBC # BLD AUTO: 4.39 10E12/L (ref 3.8–5.2)
WBC # BLD AUTO: 7.6 10E9/L (ref 4–11)

## 2020-08-24 PROCEDURE — 83550 IRON BINDING TEST: CPT | Performed by: FAMILY MEDICINE

## 2020-08-24 PROCEDURE — 80053 COMPREHEN METABOLIC PANEL: CPT | Performed by: FAMILY MEDICINE

## 2020-08-24 PROCEDURE — 85027 COMPLETE CBC AUTOMATED: CPT | Performed by: FAMILY MEDICINE

## 2020-08-24 PROCEDURE — 82607 VITAMIN B-12: CPT | Performed by: FAMILY MEDICINE

## 2020-08-24 PROCEDURE — 36415 COLL VENOUS BLD VENIPUNCTURE: CPT | Performed by: FAMILY MEDICINE

## 2020-08-24 PROCEDURE — 84439 ASSAY OF FREE THYROXINE: CPT | Performed by: FAMILY MEDICINE

## 2020-08-24 PROCEDURE — 84443 ASSAY THYROID STIM HORMONE: CPT | Performed by: FAMILY MEDICINE

## 2020-08-24 PROCEDURE — 83540 ASSAY OF IRON: CPT | Performed by: FAMILY MEDICINE

## 2020-08-25 ENCOUNTER — MYC MEDICAL ADVICE (OUTPATIENT)
Dept: FAMILY MEDICINE | Facility: CLINIC | Age: 35
End: 2020-08-25

## 2020-08-25 LAB
ALBUMIN SERPL-MCNC: 4 G/DL (ref 3.4–5)
ALP SERPL-CCNC: 73 U/L (ref 40–150)
ALT SERPL W P-5'-P-CCNC: 24 U/L (ref 0–50)
ANION GAP SERPL CALCULATED.3IONS-SCNC: 7 MMOL/L (ref 3–14)
AST SERPL W P-5'-P-CCNC: 12 U/L (ref 0–45)
BILIRUB SERPL-MCNC: 0.5 MG/DL (ref 0.2–1.3)
BUN SERPL-MCNC: 13 MG/DL (ref 7–30)
CALCIUM SERPL-MCNC: 8.8 MG/DL (ref 8.5–10.1)
CHLORIDE SERPL-SCNC: 107 MMOL/L (ref 94–109)
CO2 SERPL-SCNC: 24 MMOL/L (ref 20–32)
CREAT SERPL-MCNC: 0.67 MG/DL (ref 0.52–1.04)
GFR SERPL CREATININE-BSD FRML MDRD: >90 ML/MIN/{1.73_M2}
GLUCOSE SERPL-MCNC: 99 MG/DL (ref 70–99)
POTASSIUM SERPL-SCNC: 3.9 MMOL/L (ref 3.4–5.3)
PROT SERPL-MCNC: 7.4 G/DL (ref 6.8–8.8)
SODIUM SERPL-SCNC: 138 MMOL/L (ref 133–144)
T4 FREE SERPL-MCNC: 1.08 NG/DL (ref 0.76–1.46)
TSH SERPL DL<=0.005 MIU/L-ACNC: 4.43 MU/L (ref 0.4–4)
VIT B12 SERPL-MCNC: 243 PG/ML (ref 193–986)

## 2020-08-25 RX ORDER — LEVOTHYROXINE SODIUM 112 UG/1
112 TABLET ORAL DAILY
Qty: 90 TABLET | Refills: 0 | Status: SHIPPED | OUTPATIENT
Start: 2020-08-25 | End: 2020-12-01

## 2020-08-26 LAB
IRON SATN MFR SERPL: 13 % (ref 15–46)
IRON SERPL-MCNC: 53 UG/DL (ref 35–180)
TIBC SERPL-MCNC: 414 UG/DL (ref 240–430)

## 2020-08-26 NOTE — RESULT ENCOUNTER NOTE
Hello,    The labs show that your thyroid level has shifted and it seems to indicate that you would benefit from a higher dose of thyroid medication.  If you have been taking 100 mcg dose regularly over the last 3 months I would recommend going up to 125 mcg.  I can send this into your pharmacy.    Your B12 level was also at the lower end of normal.  I would see if you can increase B12 supplementation just with an over-the-counter thousand milligram vitamin for 2 months and then recheck this lab.    The rest of your labs looked excellent    Zhane Mckenzie MD

## 2020-08-26 NOTE — PROGRESS NOTES
I sent in 112 MCG tablet instead of 125 mcg.  Lets see if this works for her    Repeat labs in 3 months    Thanks  Sn

## 2020-08-27 ASSESSMENT — ASTHMA QUESTIONNAIRES: ACT_TOTALSCORE: 25

## 2020-08-31 NOTE — RESULT ENCOUNTER NOTE
Hello,    Last letter return did show that your iron saturation is a little bit low.  See if you can boost iron rich foods in your diet for 2 months and we can recheck this.  Iron is best taken with something acidic like orange juice so try this with your spinach, dried fruits and other iron rich foods.    Zhane Mckenzie MD

## 2020-09-11 NOTE — PROGRESS NOTES
Called pt no answer left msg with info to repeat labs in 3 months and confirm she had in-fact started new med.

## 2020-11-20 ENCOUNTER — VIRTUAL VISIT (OUTPATIENT)
Dept: FAMILY MEDICINE | Facility: CLINIC | Age: 35
End: 2020-11-20
Payer: COMMERCIAL

## 2020-11-20 DIAGNOSIS — L29.0 PRURITUS ANI: Primary | ICD-10-CM

## 2020-11-20 PROCEDURE — 99213 OFFICE O/P EST LOW 20 MIN: CPT | Mod: 95 | Performed by: FAMILY MEDICINE

## 2020-11-20 RX ORDER — ALBENDAZOLE 200 MG/1
TABLET, FILM COATED ORAL
Qty: 4 TABLET | Refills: 0 | Status: SHIPPED | OUTPATIENT
Start: 2020-11-20 | End: 2021-05-19

## 2020-11-20 NOTE — NURSING NOTE
"Chief Complaint   Patient presents with     Rectal Problem     long time     initial There were no vitals taken for this visit. Estimated body mass index is 29.7 kg/m  as calculated from the following:    Height as of 1/2/20: 1.651 m (5' 5\").    Weight as of 1/2/20: 81 kg (178 lb 8 oz).  BP completed using cuff size: .  L  R arm      Health Maintenance that is potentially due pending provider review:      Luis Cuenca ma  "

## 2020-11-20 NOTE — PROGRESS NOTES
"Awilda Campos is a 35 year old female who is being evaluated via a billable video visit.      The patient has been notified of following:     \"This video visit will be conducted via a call between you and your physician/provider. We have found that certain health care needs can be provided without the need for an in-person physical exam.  This service lets us provide the care you need with a video conversation.  If a prescription is necessary we can send it directly to your pharmacy.  If lab work is needed we can place an order for that and you can then stop by our lab to have the test done at a later time.    Video visits are billed at different rates depending on your insurance coverage.  Please reach out to your insurance provider with any questions.    If during the course of the call the physician/provider feels a video visit is not appropriate, you will not be charged for this service.\"    Patient has given verbal consent for Video visit? Yes  How would you like to obtain your AVS? MyChart  If you are dropped from the video visit, the video invite should be resent to:   Will anyone else be joining your video visit? No    Subjective     Awilda Campos is a 35 year old female who presents today via video visit for the following health issues:    HPI     \"Possible pinworms\"  Rectal itching, worse morning and evening  6 months  Used antiitch cream rectally  Mentioned it to gyn, lichen sclerosus dx, tried clobetasole.      Took OTC medication 2 weeks apart  Did not notice any difference      Video Start Time: 4:30 PM      ROS: As per HPI.  Constitutional: no recent illness, no fevers/sweats/chills  Lymph: no swollen nodes    Allergies, reviewed:       Allergies   Allergen Reactions     No Known Allergies       Med list prior to vist:       albuterol (PROAIR HFA/PROVENTIL HFA/VENTOLIN HFA) 108 (90 Base) MCG/ACT inhaler, Inhale 2 puffs into the lungs every 6 hours as needed for shortness of breath / dyspnea or " wheezing       buPROPion (WELLBUTRIN XL) 150 MG 24 hr tablet, TAKE 1 TABLET(150 MG) BY MOUTH EVERY MORNING       Cholecalciferol (VITAMIN D-3 PO),        clobetasol (TEMOVATE) 0.05 % external cream, Apply topically three times a week (Patient not taking: Reported on 8/6/2020)       levothyroxine (SYNTHROID/LEVOTHROID) 112 MCG tablet, Take 1 tablet (112 mcg) by mouth daily recheck labs in 3 months       Probiotic Product (PROBIOTIC DAILY PO),     No current facility-administered medications on file prior to visit.     Medications reviewed and updated    Objective:  There were no vitals taken for this visit.  General Appearance: Pleasant, alert, WN/WD in no acute respiratory distress.  Skin: not visible  Neurologic Exam: Nonfocal, no tremor. Normal gait.  Psychiatric Exam: Alert - appropriate, normal affect    ASSESSMENT/PLAN:    ICD-10-CM    1. Pruritus ani  L29.0 albendazole (ALBENZA) 200 MG tablet     We can try Rx but I am skeptical, since Pyrantel pamoate did not work     I think more likley over cleaning/excoriation  Discussed itch/damage/itch cycle, and stopping practices which might interfere with healing  Don't wipe, Rinse and pat dry, apply ointment and lidocaine for itching  Try rubbing and itching as little as possible      Asked pt. to return to clinic in 4 week(s) if symptoms do not improve.    Paul Calderon MD, MPH      Video-Visit Details    Type of service:  Video Visit    Video End Time:4:33 PM    Originating Location (pt. Location): Home    Distant Location (provider location):  [facility]     Platform used for Video Visit: THE FASHION

## 2020-11-30 DIAGNOSIS — E06.3 HASHIMOTO'S THYROIDITIS: ICD-10-CM

## 2020-12-01 RX ORDER — LEVOTHYROXINE SODIUM 112 UG/1
112 TABLET ORAL DAILY
Qty: 30 TABLET | Refills: 0 | Status: SHIPPED | OUTPATIENT
Start: 2020-12-01 | End: 2021-01-07

## 2020-12-01 NOTE — TELEPHONE ENCOUNTER
Due for lab recheck   Will callback to schedule  Prescription approved per [facility] Refill Protocol.  Kayce VALDERRAMA RN

## 2021-01-11 DIAGNOSIS — R53.83 FATIGUE, UNSPECIFIED TYPE: ICD-10-CM

## 2021-01-11 DIAGNOSIS — E06.3 HASHIMOTO'S THYROIDITIS: ICD-10-CM

## 2021-01-11 PROCEDURE — 84443 ASSAY THYROID STIM HORMONE: CPT | Performed by: FAMILY MEDICINE

## 2021-01-11 PROCEDURE — 36415 COLL VENOUS BLD VENIPUNCTURE: CPT | Performed by: FAMILY MEDICINE

## 2021-01-12 LAB — TSH SERPL DL<=0.005 MIU/L-ACNC: 1.74 MU/L (ref 0.4–4)

## 2021-01-15 ENCOUNTER — HEALTH MAINTENANCE LETTER (OUTPATIENT)
Age: 36
End: 2021-01-15

## 2021-01-31 DIAGNOSIS — E06.3 HASHIMOTO'S THYROIDITIS: ICD-10-CM

## 2021-02-03 RX ORDER — LEVOTHYROXINE SODIUM 100 UG/1
TABLET ORAL
Qty: 90 TABLET | Refills: 3 | OUTPATIENT
Start: 2021-02-03

## 2021-02-11 ENCOUNTER — OFFICE VISIT (OUTPATIENT)
Dept: FAMILY MEDICINE | Facility: CLINIC | Age: 36
End: 2021-02-11
Payer: COMMERCIAL

## 2021-02-11 VITALS
HEIGHT: 66 IN | BODY MASS INDEX: 28.28 KG/M2 | RESPIRATION RATE: 16 BRPM | DIASTOLIC BLOOD PRESSURE: 74 MMHG | OXYGEN SATURATION: 100 % | SYSTOLIC BLOOD PRESSURE: 107 MMHG | WEIGHT: 176 LBS | HEART RATE: 71 BPM | TEMPERATURE: 97.5 F

## 2021-02-11 DIAGNOSIS — R41.840 CONCENTRATION DEFICIT: ICD-10-CM

## 2021-02-11 DIAGNOSIS — Z80.3 FAMILY HISTORY OF MALIGNANT NEOPLASM OF BREAST: ICD-10-CM

## 2021-02-11 DIAGNOSIS — Z00.00 ROUTINE GENERAL MEDICAL EXAMINATION AT A HEALTH CARE FACILITY: Primary | ICD-10-CM

## 2021-02-11 DIAGNOSIS — E06.3 HASHIMOTO'S THYROIDITIS: ICD-10-CM

## 2021-02-11 PROCEDURE — 99395 PREV VISIT EST AGE 18-39: CPT | Performed by: FAMILY MEDICINE

## 2021-02-11 RX ORDER — LEVOTHYROXINE SODIUM 112 UG/1
112 TABLET ORAL DAILY
Qty: 90 TABLET | Refills: 3 | Status: SHIPPED | OUTPATIENT
Start: 2021-02-11 | End: 2022-03-28

## 2021-02-11 ASSESSMENT — PATIENT HEALTH QUESTIONNAIRE - PHQ9
10. IF YOU CHECKED OFF ANY PROBLEMS, HOW DIFFICULT HAVE THESE PROBLEMS MADE IT FOR YOU TO DO YOUR WORK, TAKE CARE OF THINGS AT HOME, OR GET ALONG WITH OTHER PEOPLE: EXTREMELY DIFFICULT
SUM OF ALL RESPONSES TO PHQ QUESTIONS 1-9: 11
SUM OF ALL RESPONSES TO PHQ QUESTIONS 1-9: 11

## 2021-02-11 ASSESSMENT — MIFFLIN-ST. JEOR: SCORE: 1511.08

## 2021-02-11 NOTE — PROGRESS NOTES
SUBJECTIVE:   CC: Awilda Campos is an 35 year old woman who presents for preventive health visit.       Patient has been advised of split billing requirements and indicates understanding: Yes  Healthy Habits:     Getting at least 3 servings of Calcium per day:  Yes    Bi-annual eye exam:  NO    Dental care twice a year:  Yes    Sleep apnea or symptoms of sleep apnea:  None    Diet:  Regular (no restrictions)    Frequency of exercise:  None    Taking medications regularly:  Yes    Medication side effects:  None    PHQ-2 Total Score: 2    Additional concerns today:  Yes    (Z00.00) Routine general medical examination at a health care facility  (primary encounter diagnosis)  Comment:   Plan:     (E06.3) Hashimoto's thyroiditis  Comment:   Plan: levothyroxine (SYNTHROID/LEVOTHROID) 112 MCG         tablet            (Z80.3) Family history of malignant neoplasm of breast  Comment:   Plan: *MA Screening Digital Bilateral            (R45.290) Concentration deficit  Comment: stil struggles with this would like referral  Plan: MENTAL HEALTH REFERRAL  - Adult; Assessments         and Testing; ADHD; [facility]: [facility] [phone]; We will contact you to         schedule the appointment or please call with         any questions                   Mammogram     Today's PHQ-2 Score:   PHQ-2 ( 1999 [employer]) 2/11/2021   Q1: Little interest or pleasure in doing things 1   Q2: Feeling down, depressed or hopeless 1   PHQ-2 Score 2   Q1: Little interest or pleasure in doing things Several days   Q2: Feeling down, depressed or hopeless Several days   PHQ-2 Score 2       Abuse: Current or Past (Physical, Sexual or Emotional) - No  Do you feel safe in your environment? Yes    Answers for HPI/ROS submitted by the patient on 2/11/2021   Annual Exam:  If you checked off any problems, how difficult have these problems made it for you to do your work, take care of things at home, or get along with other people?:  Extremely difficult  PHQ9 TOTAL SCORE: 11      Social History     Tobacco Use     Smoking status: Never Smoker     Smokeless tobacco: Never Used   Substance Use Topics     Alcohol use: Yes     Comment: very infrequently     If you drink alcohol do you typically have >3 drinks per day or >7 drinks per week? No    Alcohol Use 2/11/2021   Prescreen: >3 drinks/day or >7 drinks/week? No   No flowsheet data found.      Reviewed orders with patient.  Reviewed health maintenance and updated orders accordingly - Yes      Breast CA Risk Screening:  Breast CA Risk Assessment (FHS-7) 2/11/2021   Do you have a family history of breast, colon, or ovarian cancer? Yes   Did any of your first-degree relatives have breast or ovarian cancer? Yes   Did any of your relatives have bilateral breast cancer? No   Did any man in your family have breast cancer? No   Did any woman in your family have breast and ovarian cancer? Yes   Did any woman in your family have breast cancer before age 50 y? Yes   Do you have 2 or more relatives with breast and/or ovarian cancer? Unknown   Do you have 2 or more relatives with breast and/or bowel cancer? Unknown           Pertinent mammograms are reviewed under the imaging tab.    History of abnormal Pap smear: NO - age 30-65 PAP every 5 years with negative HPV co-testing recommended  PAP / HPV Latest Ref Rng & Units 3/8/2019 3/7/2019   PAP - - NIL   HPV 16 DNA NEG:Negative Negative -   HPV 18 DNA NEG:Negative Negative -   OTHER HR HPV NEG:Negative Negative -     Reviewed and updated as needed this visit by clinical staff  Tobacco                Reviewed and updated as needed this visit by Provider                Past Medical History:   Diagnosis Date     Asthma      Hashimoto's disease      Lichen sclerosus 11/2019     Uncomplicated asthma     Uses an inhaler as needed      Past Surgical History:   Procedure Laterality Date     NO HISTORY OF SURGERY         Review of Systems  CONSTITUTIONAL: NEGATIVE for  "fever, chills, change in weight  INTEGUMENTARU/SKIN: NEGATIVE for worrisome rashes, moles or lesions  EYES: NEGATIVE for vision changes or irritation  ENT: NEGATIVE for ear, mouth and throat problems  RESP: NEGATIVE for significant cough or SOB  BREAST: NEGATIVE for masses, tenderness or discharge  CV: NEGATIVE for chest pain, palpitations or peripheral edema  GI: NEGATIVE for nausea, abdominal pain, heartburn, or change in bowel habits  : NEGATIVE for unusual urinary or vaginal symptoms. Periods are regular.  MUSCULOSKELETAL: NEGATIVE for significant arthralgias or myalgia  NEURO: NEGATIVE for weakness, dizziness or paresthesias  PSYCHIATRIC: NEGATIVE for changes in mood or affect     OBJECTIVE:   Pulse 71   Resp 16   Ht 1.67 m (5' 5.75\")   Wt 79.8 kg (176 lb)   SpO2 100%   BMI 28.63 kg/m    Physical Exam  GENERAL: healthy, alert and no distress  EYES: Eyes grossly normal to inspection, PERRL and conjunctivae and sclerae normal  HENT: ear canals and TM's normal, nose and mouth without ulcers or lesions  NECK: no adenopathy, no asymmetry, masses, or scars and thyroid normal to palpation  RESP: lungs clear to auscultation - no rales, rhonchi or wheezes  BREAST: normal without masses, tenderness or nipple discharge and no palpable axillary masses or adenopathy  CV: regular rate and rhythm, normal S1 S2, no S3 or S4, no murmur, click or rub, no peripheral edema and peripheral pulses strong  ABDOMEN: soft, nontender, no hepatosplenomegaly, no masses and bowel sounds normal  MS: no gross musculoskeletal defects noted, no edema  SKIN: no suspicious lesions or rashes  NEURO: Normal strength and tone, mentation intact and speech normal  PSYCH: mentation appears normal, affect normal/bright    Diagnostic Test Results:  Labs reviewed in Epic    ASSESSMENT/PLAN:   1. Routine general medical examination at a health care facility  See avs    2. Hashimoto's thyroiditis    - levothyroxine (SYNTHROID/LEVOTHROID) 112 MCG " "tablet; Take 1 tablet (112 mcg) by mouth daily  Dispense: 90 tablet; Refill: 3    3. Family history of malignant neoplasm of breast    - *MA Screening Digital Bilateral; Future    4. Concentration deficit    - MENTAL HEALTH REFERRAL  - Adult; Assessments and Testing; ADHD; [facility]: [facility] [phone]; We will contact you to schedule the appointment or please call with any questions    Patient has been advised of split billing requirements and indicates understanding: Yes  COUNSELING:  Reviewed preventive health counseling, as reflected in patient instructions       Regular exercise       Healthy diet/nutrition    Estimated body mass index is 28.63 kg/m  as calculated from the following:    Height as of this encounter: 1.67 m (5' 5.75\").    Weight as of this encounter: 79.8 kg (176 lb).        She reports that she has never smoked. She has never used smokeless tobacco.      Counseling Resources:  ATP IV Guidelines  Pooled Cohorts Equation Calculator  Breast Cancer Risk Calculator  BRCA-Related Cancer Risk Assessment: FHS-7 Tool  FRAX Risk Assessment  ICSI Preventive Guidelines  Dietary Guidelines for Americans, 2010  USDA's MyPlate  ASA Prophylaxis  Lung CA Screening    Zhane Mckenzie MD  [facility]  "

## 2021-02-12 ASSESSMENT — ASTHMA QUESTIONNAIRES: ACT_TOTALSCORE: 25

## 2021-02-22 ENCOUNTER — ANCILLARY PROCEDURE (OUTPATIENT)
Dept: MAMMOGRAPHY | Facility: CLINIC | Age: 36
End: 2021-02-22
Attending: FAMILY MEDICINE
Payer: COMMERCIAL

## 2021-02-22 DIAGNOSIS — Z80.3 FAMILY HISTORY OF MALIGNANT NEOPLASM OF BREAST: ICD-10-CM

## 2021-02-22 PROCEDURE — 77067 SCR MAMMO BI INCL CAD: CPT | Mod: TC | Performed by: RADIOLOGY

## 2021-03-15 ENCOUNTER — VIRTUAL VISIT (OUTPATIENT)
Dept: PSYCHOLOGY | Facility: CLINIC | Age: 36
End: 2021-03-15
Payer: COMMERCIAL

## 2021-03-15 DIAGNOSIS — F41.9 ANXIETY: Primary | ICD-10-CM

## 2021-03-15 PROCEDURE — 90791 PSYCH DIAGNOSTIC EVALUATION: CPT | Mod: 95 | Performed by: PSYCHOLOGIST

## 2021-03-15 ASSESSMENT — COLUMBIA-SUICIDE SEVERITY RATING SCALE - C-SSRS
1. IN THE PAST MONTH, HAVE YOU WISHED YOU WERE DEAD OR WISHED YOU COULD GO TO SLEEP AND NOT WAKE UP?: NO
2. HAVE YOU ACTUALLY HAD ANY THOUGHTS OF KILLING YOURSELF LIFETIME?: NO
1. IN THE PAST MONTH, HAVE YOU WISHED YOU WERE DEAD OR WISHED YOU COULD GO TO SLEEP AND NOT WAKE UP?: NO
2. HAVE YOU ACTUALLY HAD ANY THOUGHTS OF KILLING YOURSELF?: NO
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
## 2021-03-17 ENCOUNTER — DOCUMENTATION ONLY (OUTPATIENT)
Dept: PSYCHOLOGY | Facility: CLINIC | Age: 36
End: 2021-03-17

## 2021-03-17 NOTE — PROGRESS NOTES
Client Name: Fiona Campos   MRN: [id number]  : 1985    Client completed the [place] Multiphasic Personality Inventory-2 (MMPI-2), a self-report personality inventory, as part of her evaluation. Validity scales indicate that the client responded in an open and consistent manner, resulting in a valid profile. The following results are likely to be an accurate reflection of client's current functioning. Client s responses suggest that she is reporting low levels of general emotional distress. Individuals with similar profiles tend to generally see themselves as well adjusted and as sufficiently able to cope with the difficulties they face that they feel little or no need to draw attention to them. They may lack drive, energy, interest, and motivation. They may be intolerant of frustration, have a low threshold for boredom, can be excitable and overreactive and seemingly unable to modulate their impulses.

## 2021-03-22 ENCOUNTER — VIRTUAL VISIT (OUTPATIENT)
Dept: PSYCHOLOGY | Facility: CLINIC | Age: 36
End: 2021-03-22
Payer: COMMERCIAL

## 2021-03-22 DIAGNOSIS — F41.9 ANXIETY: Primary | ICD-10-CM

## 2021-03-22 PROCEDURE — 90832 PSYTX W PT 30 MINUTES: CPT | Mod: 95 | Performed by: PSYCHOLOGIST
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
## 2021-04-16 ENCOUNTER — DOCUMENTATION ONLY (OUTPATIENT)
Dept: PSYCHOLOGY | Facility: CLINIC | Age: 36
End: 2021-04-16

## 2021-04-16 NOTE — PROGRESS NOTES
"Client Name: Fiona Campos   MRN: [id number]  : 1985    Henrique Adult ADHD Rating Scale-IV: Self and Other Reports (BAARS-IV)  The BAARS-IV assesses for symptoms of ADHD that are experienced in one's daily life. This assessment measure includes self and collateral rating scales designed to provide information regarding current and childhood symptoms of ADHD including inattention, hyperactivity, and impulsivity. Self-report scores are reported as percentiles. Scores at the 76th-83rd percentile are considered marginal, scores at the 84th-92nd percentile are considered borderline, scores at the 93rd-95th percentile are considered mild, scores at the 96th-98th percentile are considered moderate, and those at the 99th percentile are considered severe. Collateral or \"other\" rating scales are reported as number of symptoms observed in comparison to those reported by the client. Norms and percentile scores are not available for collateral reports.      Current Symptoms Scale--Self Report:   Client completed the self-report inventory of current symptoms. The results indicate that the client's Total ADHD Score was 67 which places her in the 99th percentile for overall ADHD symptoms. In addition, the client endorsed the following occur \"often\" or \"very often\": 9/9 (99th percentile) Inattention symptoms, 9/9 (99th percentile) Hyperactivity/Impulsivity symptoms, and 5/9 (94th percentile) Sluggish Cognitive Tempo symptoms. Client indicated that the reported symptoms have resulted in impaired functioning in school, work, and social relationships. Overall, the results suggest the client is reporting severe symptoms of inattention and severe symptoms of hyperactivity/impulsivity at this time.      Current Symptoms Scale--Other Report:  Client's spouse completed the collateral report inventory of current symptoms. Based on the collateral contact's observation of symptoms, the client demonstrates the following \"often\" or \"very " "often\": 4/9 Inattention symptoms, 1/5 Hyperactivity symptoms, 4/4 Impulsivity symptoms, and 2/9 Sluggish Cognitive Tempo symptoms. The client's Total ADHD Score was 43. The collateral- and self-report scores are similar and suggest Client experiences symptoms of inattention and hyperactivity/impulsivity at this time. Client s spouse noted fewer symptoms of ADHD at this time.     Childhood Symptoms Scale--Self-Report:  Client completed the self-report inventory of childhood symptoms. The results indicate that the client's Total ADHD Score was 49 which places her in the 95th percentile for overall ADHD symptoms in childhood. In addition, the client endorsed having experienced the following \"often\" or \"very often\": 2/9 (83rd percentile) Inattention symptoms and 8/9 (98th percentile) Hyperactivity-Impulsivity symptoms. Client indicated that the reported symptoms resulted in impaired functioning in school and at home. Overall, the results suggest the client reported experiencing moderate symptoms of hyperactivity/impulsivity as a child.     Childhood Symptoms Scale--Other Report:  Client s family member completed the collateral report inventory of childhood symptoms. Based on the collateral contact's recollection of client's childhood symptoms, the client demonstrated the following \"often\" or \"very often\": 0/9 Inattention symptoms and 3/9 Hyperactivity-Impulsivity symptoms. The client's Total ADHD Score was 33. The collateral- and self-report scores are discrepant. Her family member did not note symptoms of ADHD in childhood.     Henrique Functional Impairment Scale: Self and Other Reports (BFIS)  The BFIS is used to assess an individuals' psychosocial impairment in major life/daily activities that may be due to a mental health disorder. This assessment measure includes self and collateral rating scales. Self-report scores are reported as percentiles. Scores at the 76th-83rd percentile are considered marginal, scores at " "the 84th-92nd percentile are considered borderline, scores at the 93rd-95th percentile are considered mild, scores at the 96th-98th percentile are considered moderate, and those at the 99th percentile are considered severe. Collateral or \"other\" rating scales are reported as number of symptoms observed in comparison to those reported by the client. Norms and percentile scores are not available for collateral reports.      Results indicate the client identified impairment (scores at or greater than 93rd percentile) in the following area: work, driving, and health maintenance. The client's Mean Impairment Score was 4.25 (75th-84th percentile) indicating the client is not reporting impairment in functioning across domains. Client's spouse completed the collateral rating scale, which indicated similar results (e.g., Mean Impairment Score of 3.92). He did not note impairment in any domains.      Henrique Deficits in Executive Functioning Scale (BDEFS)  The BDEFS is a measure used for evaluating dimensions of adult executive functioning in daily life. This assessment measure includes self and collateral rating scales. Self-report scores are reported as percentiles. Scores at the 76th-83rd percentile are considered marginal, scores at the 84th-92nd percentile are considered borderline, scores at the 93rd-95th percentile are considered mild, scores at the 96th-98th percentile are considered moderate, and those at the 99th percentile are considered severe. Collateral or \"other\" rating scales are reported as number of symptoms observed in comparison to those reported by the client. Norms and percentile scores are not available for collateral reports.      Results indicate the client's Total Executive Functioning Score was 226 (96th percentile). The ADHD-Executive Functioning Index score was 28 (96th percentile). These scores suggest the client is reporting moderate deficits in executive functioning. Specifically, she noted the " following: self-management to time (mild); self-organization/problem-solving (mild); self-restraint (severe); self-motivation (mild); and self-regulation of emotions (mild). Client s spouse completed the collateral report which demonstrated discrepant results. His scores were considerably lower; he did not note deficits in any domains.    Generalized Anxiety Disorder Questionnaire (ROWAN-7)  This questionnaire is designed to screen for anxiety in adults. Based on the client's score of 12, she is reporting moderate symptoms of anxiety at this time. Client identified the following symptoms of anxiety: feeling nervous, anxious or on edge; not being able to stop or control worrying, worrying too much about different things, being so restless that it s hard to sit still; trouble relaxing, and becoming easily annoyed or irritable.    Patient Health Questionnaire- 9 (PHQ-9)   This questionnaire is designed to screen for depression in adults. Based on the client's score of 5, she is reporting mild symptoms of depression at this time. Client identified the following symptoms of depression: trouble falling or staying asleep or sleeping too much, feeling tired or having little energy, poor appetite or overeating, and poor concentration.

## 2021-04-21 ENCOUNTER — DOCUMENTATION ONLY (OUTPATIENT)
Dept: PSYCHOLOGY | Facility: CLINIC | Age: 36
End: 2021-04-21

## 2021-04-21 NOTE — PROGRESS NOTES
[facility]  ADHD Evaluation    Patient: Fiona Campos  YOB: 1985  MRN: [id number]      Date(s) of assessment: Diagnostic Assessment (3/15/21; 3/22/21); MMPI (Administered 3/17/21; Interpreted on 3/17/21); Henrique self-report and collateral measures scored and interpreted (4/16/21)    Information about appointment:  Client attended two sessions to aid in determining client's mental health diagnosis or diagnoses and treatment recommendations that best address client concerns. Available medical records were reviewed. There were no previous psychological evaluations for review. A diagnostic assessment was conducted at the initial appointment. Client completed several rating scales to assist in assessing attention-related and other mental health symptoms that may be causing impairments in functioning. Rating scales were also completed by a collateral contact. Personality testing was also completed to aid in diagnostic clarification.     Assessment tools:   Henrique Adult ADHD Rating Scale-IV: Self and Other Reports (BAARS-IV), Henrique Functional Impairment Scale: Self and Other Reports (BFIS), Henrique Deficits in Executive Functioning Scale: Self and Other Reports (BDEFS), Patient Health Questionnaire-9 (PHQ-9), and Generalized Anxiety Disorder-7 (ROWAN-7); [place] Multiphasic Personality Inventory-Second Edition (MMPI-2)     Assessment Results:  Behavioral Observations:  Client arrived to each session on-time. She was pleasant and cooperative at all times. Client did not demonstrate significant difficulties with inattention or hyperactivity/impulsivity during the sessions. The following results are likely to be an accurate reflection of Client's current functioning.      Henrique Adult ADHD Rating Scale-IV: Self and Other Reports (BAARS-IV)  The BAARS-IV assesses for symptoms of ADHD that are experienced in one's daily life. This assessment measure includes self and collateral rating scales  "designed to provide information regarding current and childhood symptoms of ADHD including inattention, hyperactivity, and impulsivity. Self-report scores are reported as percentiles. Scores at the 76th-83rd percentile are considered marginal, scores at the 84th-92nd percentile are considered borderline, scores at the 93rd-95th percentile are considered mild, scores at the 96th-98th percentile are considered moderate, and those at the 99th percentile are considered severe. Collateral or \"other\" rating scales are reported as number of symptoms observed in comparison to those reported by the client. Norms and percentile scores are not available for collateral reports.      Current Symptoms Scale--Self Report:   Client completed the self-report inventory of current symptoms. The results indicate that the client's Total ADHD Score was 67 which places her in the 99th percentile for overall ADHD symptoms. In addition, the client endorsed the following occur \"often\" or \"very often\": 9/9 (99th percentile) Inattention symptoms, 9/9 (99th percentile) Hyperactivity/Impulsivity symptoms, and 5/9 (94th percentile) Sluggish Cognitive Tempo symptoms. Client indicated that the reported symptoms have resulted in impaired functioning in school, work, and social relationships. Overall, the results suggest the client is reporting severe symptoms of inattention and severe symptoms of hyperactivity/impulsivity at this time.      Current Symptoms Scale--Other Report:  Client's spouse completed the collateral report inventory of current symptoms. Based on the collateral contact's observation of symptoms, the client demonstrates the following \"often\" or \"very often\": 4/9 Inattention symptoms, 1/5 Hyperactivity symptoms, 4/4 Impulsivity symptoms, and 2/9 Sluggish Cognitive Tempo symptoms. The client's Total ADHD Score was 43. The collateral- and self-report scores are similar and suggest Client experiences symptoms of inattention and " "hyperactivity/impulsivity at this time. Client s spouse noted fewer symptoms of ADHD at this time.     Childhood Symptoms Scale--Self-Report:  Client completed the self-report inventory of childhood symptoms. The results indicate that the client's Total ADHD Score was 49 which places her in the 95th percentile for overall ADHD symptoms in childhood. In addition, the client endorsed having experienced the following \"often\" or \"very often\": 2/9 (83rd percentile) Inattention symptoms and 8/9 (98th percentile) Hyperactivity-Impulsivity symptoms. Client indicated that the reported symptoms resulted in impaired functioning in school and at home. Overall, the results suggest the client reported experiencing moderate symptoms of hyperactivity/impulsivity as a child.     Childhood Symptoms Scale--Other Report:  Client s family member completed the collateral report inventory of childhood symptoms. Based on the collateral contact's recollection of client's childhood symptoms, the client demonstrated the following \"often\" or \"very often\": 0/9 Inattention symptoms and 3/9 Hyperactivity-Impulsivity symptoms. The client's Total ADHD Score was 33. The collateral- and self-report scores are discrepant. Her family member did not note symptoms of ADHD in childhood.     Henrique Functional Impairment Scale: Self and Other Reports (BFIS)  The BFIS is used to assess an individuals' psychosocial impairment in major life/daily activities that may be due to a mental health disorder. This assessment measure includes self and collateral rating scales. Self-report scores are reported as percentiles. Scores at the 76th-83rd percentile are considered marginal, scores at the 84th-92nd percentile are considered borderline, scores at the 93rd-95th percentile are considered mild, scores at the 96th-98th percentile are considered moderate, and those at the 99th percentile are considered severe. Collateral or \"other\" rating scales are reported as " "number of symptoms observed in comparison to those reported by the client. Norms and percentile scores are not available for collateral reports.      Results indicate the client identified impairment (scores at or greater than 93rd percentile) in the following area: work, driving, and health maintenance. The client's Mean Impairment Score was 4.25 (75th-84th percentile) indicating the client is not reporting impairment in functioning across domains. Client's spouse completed the collateral rating scale, which indicated similar results (e.g., Mean Impairment Score of 3.92). He did not note impairment in any domains.      Henrique Deficits in Executive Functioning Scale (BDEFS)  The BDEFS is a measure used for evaluating dimensions of adult executive functioning in daily life. This assessment measure includes self and collateral rating scales. Self-report scores are reported as percentiles. Scores at the 76th-83rd percentile are considered marginal, scores at the 84th-92nd percentile are considered borderline, scores at the 93rd-95th percentile are considered mild, scores at the 96th-98th percentile are considered moderate, and those at the 99th percentile are considered severe. Collateral or \"other\" rating scales are reported as number of symptoms observed in comparison to those reported by the client. Norms and percentile scores are not available for collateral reports.      Results indicate the client's Total Executive Functioning Score was 226 (96th percentile). The ADHD-Executive Functioning Index score was 28 (96th percentile). These scores suggest the client is reporting moderate deficits in executive functioning. Specifically, she noted the following: self-management to time (mild); self-organization/problem-solving (mild); self-restraint (severe); self-motivation (mild); and self-regulation of emotions (mild). Client s spouse completed the collateral report which demonstrated discrepant results. His scores " were considerably lower; he did not note deficits in any domains.    Summary of [place] Multiphasic Personality Inventory--Second Edition   Client completed the [place] Multiphasic Personality Inventory-2 (MMPI-2), a self-report personality inventory, as part of her evaluation. Validity scales indicate that the client responded in an open and consistent manner, resulting in a valid profile. The following results are likely to be an accurate reflection of client's current functioning. Client s responses suggest that she is reporting low levels of general emotional distress. Individuals with similar profiles tend to generally see themselves as well adjusted and as sufficiently able to cope with the difficulties they face that they feel little or no need to draw attention to them. They may lack drive, energy, interest, and motivation. They may be intolerant of frustration, have a low threshold for boredom, can be excitable and overreactive and seemingly unable to modulate their impulses.      Generalized Anxiety Disorder Questionnaire (ROWAN-7)  This questionnaire is designed to screen for anxiety in adults. Based on the client's score of 12, she is reporting moderate symptoms of anxiety at this time. Client identified the following symptoms of anxiety: feeling nervous, anxious or on edge; not being able to stop or control worrying, worrying too much about different things, being so restless that it s hard to sit still; trouble relaxing, and becoming easily annoyed or irritable.    Patient Health Questionnaire- 9 (PHQ-9)   This questionnaire is designed to screen for depression in adults. Based on the client's score of 5, she is reporting mild symptoms of depression at this time. Client identified the following symptoms of depression: trouble falling or staying asleep or sleeping too much, feeling tired or having little energy, poor appetite or overeating, and poor concentration.      Summary (based on clinical  "interview, review of records, test results):  Client is a 36-year-old, ,  female. Client was referred for a diagnostic assessment by PCP. The purpose of this evaluation is to: provide treatment recommendations and clarify diagnosis. Client's presenting concerns include: It has always been hard for her to focus. She has always been high energy until Hashimoto's diagnosis after her first son was born about 6 years ago (this has made her more tired). She stated, \"If my medication is off I will sleep excessively and could sleep more than 10 hours per night every night. She has also gained weight due to thyroid issues and since having children. It has been hard to lose weight with diet and exercise. It is hard for her to follow through with things. She is becoming more aware of this as she gets older. She feels disorganized at work. At home, she tries to be regimented to \"keep things on track with the kids.\" They use calendars to help track activities and appointments for kids.  She jumps from one thing to another without completing things. If she isn't interested in a conversation, she has a hard time following it and listening. She might \"zone out.\" If she is interested, then she doesn't have issues. She can be forgetful; if she doesn't keep her house \"pristine\" she wouldn't be able to find anything (\"I would just make piles of everything\"). She is good at organizing things, but if she doesn't throw things away and keep it tidy things would \"pile up.\" She is \"always doing something\" and is often \"on the go.\" She can sit down if she is very tired, otherwise she is usually multi-tasking and doing something else. Client stated that symptoms have resulted in the following functional impairments: home life with her family and work / vocational responsibilities. Client reported that she has not completed a previous ADHD diagnostic assessment. Client has not received a previous diagnosis of ADHD. Client " "reported that medication has not been prescribed medication to address these problems. Client reported that these problem(s) began 6 years ago (around the time of the Hashimoto's diagnosis). It has always been hard for her to focus. Client has not attempted to resolve these concerns in the past. Client reported that other professional(s) are not involved in providing support / services. She did not report a personal history of chemical dependence.    Client reported she grew up in [place], [place]. She was raised by her biological parents. Her parents stayed . She was the first born of two children. She has a brother who was 8 years younger than her so she felt she grew up as an only child. Client reported that their childhood was \"good.\" Her parents are very different from each other, so she felt her family was 'unique.' Client described their current relationships with family of origin as \"alright.\" She talks to her brother, but they aren't very close; she has a \"fine\" relationship with her parents. Client reported the following relationship history: one marriage. Client's current relationship status is  for 8 years. Client reported that they get along very well. Client identified their sexual orientation as heterosexual. Client reported having two children; ages 5 and 2. Client identified friends and spouse as part of their support system.  She talks to friends often. Client identified the quality of these relationships as good.      As a child, client reported that she failed to complete assigned chores in the home environment, had problems with organization and keeping track of items, misplaced or lost things, displayed argumentative or oppositional behaviors and had problems managing temper with frequent emotional outbursts. Client reported difficulty with childhood peer relationships. She had some friends but had difficulty maintaining long-term friendships and making new friends. As a child, " "client reported having regular and consistent sleep patterns. Client reported currently experiencing regular and consistent sleep patterns. Client reported sleeping approximately 8 hours per night. Her Hashimoto's causes her to oversleep and sleep too much at times (when medications aren't in balance). She could sleep 10 hours. Client reported that she has not completed a sleep study.      Client's highest education level was college graduate. She graduated high school in 2004. She estimated she obtained mostly As and Bs. During the elementary, middle, and high school years, Client recalls academic strengths in the areas of English and arts. Client reported experiencing academic problems in math and science. Client did not identify any learning problems. Client did not receive tutoring services during the school years. Client did not receive special education services. She did AP and accelerated programs as a child (all through elementary and middle school). Client reported failure to finish or complete homework. She would do things \"as fast as I could at the last second to get by.  She often procrastinated. She noted that she knew she had to do homework but would put it off until the due date got closer. She stated, \"I had anxiety about starting so I'd procrastinate and push it off as far as possible to avoid it.\" She was able to get things done but \"in as little time as possible that I had to do it.\" She had a hard time following along and paying attention in class. She felt she would \"tune out\" during long presentations; she tried to take notes to make sure she was actively listening to what was being said. If she wasn't taking notes she would be fidgeting, \"playing with anything in my hand\" and doodling in her notebook (\"anything to have something else going on\"). She was often corrected for \"getting other classmates riled up\" when she wasn't paying attention. She felt she was a distraction to others. Client " "reported she was \"very good\" at test taking (specifically with standardized tests). She struggled with having to read something and answer questions about it (reading comprehension \"I would get lost in it depending on what the details were; if I was very interested I would read it so intently and if I wasn't interested I'd realize that I'd zone out and wasn't paying attention to what I read\"). Client did attend post-secondary school. She graduated from college in 2008 with a degree in environmental horticulture. She reported she obtained between As-Cs (in \"hard sciences like biology/chemistry I was scraping by\"). She got As in things she was interested in. She worked a lot in college and had multiple jobs so she would procrastinate on homework and had limited time to get things done. She waited until the last minute to do assignments. She usually attended classes (unless it wasn't necessary, and she could get out of class if they posted power-point presentations online she could review on her own to learn). She procrastinated on larger projects but would do \"okay\" on them. She isn't a person who studies much; she doesn't remember really having to study for tests. She felt if she didn't retain information from going to class and taking her notes then she wouldn't know it. She noted studying was \"minimal.\" Classes in college were slightly more challenging than high school. Overall, she felt her routines and performance was in college was similar to what it was when she was in high school.     Client reported that she is currently employed full-time. Client reported that the current job is a good fit for her skills and personality. 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 She has been doing this for almost three years. Her job keeps her engaged and \"well occupied.\" Her job is active and she is able to make her own schedule. There aren't a lot of external demands and she can work at her " "own pace. She works out of the office doing scheduling and administrative work and she is also \"out in the field\" and they are able to be outside and active as well. She noted she is able to \"do what I want\" and this is a good fit for you. She likes the variety that her job offers. Client reported that she frequently made mistakes with poor attention to detail, often felt bored, distractible behavior and poor time management (she can meet deadlines but if there isn't a deadline, she will procrastinate on it). She might complete things last minute. She sometimes makes \"insignificant mistakes.\" She noted she is \"never late\" and has anxiety about being late so she is always early. She feels she might get frustrated if others she works with \"can't keep up\" or she has to slow herself down to accommodate that. 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 The longest period of employment has been 6 years ( - but also held other jobs while there). Client has not been terminated from a place of employment.        Results of testing were suggestive of mild ADHD. Rating scales suggested the client is experiencing symptoms of hyperactivity/impulsivity that have been present since childhood. The collateral report (her ) also noted current symptoms of inattention and hyperactivity/impulsivity. While her father did not report significant childhood symptoms of ADHD, Client s report during the clinical interview was suggestive of such issues in childhood (e.g., poor time management, disruptive behavior, difficulty focusing in school). Deficits in executive functioning were reported to be in the moderate range. Client s responses on self-report scales suggested she is experiencing moderate anxiety and mild depression at this time. Personality testing was positive for impulsivity and hyperactivity. Based on the results of clinical interview and psychological " testing, the client currently meets criteria for diagnoses of Attention-Deficit/Hyperactivity Disorder, Combined Presentation and Other Specified Anxiety Disorder. Client will be provided with the results of testing, diagnosis, and recommendations in her last appointment.    DSM5 Diagnoses: (Sustained by DSM5 Criteria Listed Above)    Attention-Deficit/Hyperactivity Disorder, Combined Presentation (F90.2)    Other Specified Anxiety Disorder (F41.9)    Psychosocial & Contextual Factors: work stress; COVID-19 pandemic     Recommendations:    1. Schedule an appointment with your physician or psychiatrist to discuss a medication evaluation. Medication can be very helpful in treating the symptoms of anxiety and ADHD. It will be important that each condition is treated, as treatment for one without the other may lead to an increase in symptoms (e.g., treating ADHD, but not anxiety, can lead to increased anxiety).    2. Access resources through websites, books, and articles such as those provided in the Coping with ADHD handout.    3. Consider working with an ADHD  or individual therapist to learn skills to assist with symptom management, as well as ways to improve relationships, etc., that may have been impacted by your symptoms.    4. If anxiety worsens or becomes difficult to manage, individual therapy is recommended. Therapies focused on identifying and challenging problematic thought and behavior patterns while increasing the use of healthy coping skills has been found to be effective in treating anxiety. It will be important to set goals in this therapy and work actively toward achieving short-term successes that lead to the completion of each goal. Action-oriented therapies, such as CBT and ACT are particularly recommended for the treatment of chronic anxiety.    5. The use of behavioral strategies such as diaphragmatic breathing, guided imagery, and mindfulness is often helpful in the management of anxiety  symptoms.    6. You are welcome to schedule a follow-up appointment with me in about 6 weeks to review symptoms, treatment involvement, and struggles and/or successes.       Julienne Rose, Ph.D.,   Licensed Psychologist

## 2021-05-07 ENCOUNTER — VIRTUAL VISIT (OUTPATIENT)
Dept: PSYCHOLOGY | Facility: CLINIC | Age: 36
End: 2021-05-07
Payer: COMMERCIAL

## 2021-05-07 DIAGNOSIS — F41.9 ANXIETY: Primary | ICD-10-CM

## 2021-05-07 DIAGNOSIS — F90.2 ATTENTION DEFICIT HYPERACTIVITY DISORDER, COMBINED TYPE: ICD-10-CM

## 2021-05-07 PROCEDURE — 96131 PSYCL TST EVAL PHYS/QHP EA: CPT | Mod: 95 | Performed by: PSYCHOLOGIST

## 2021-05-07 PROCEDURE — 96130 PSYCL TST EVAL PHYS/QHP 1ST: CPT | Mod: 95 | Performed by: PSYCHOLOGIST

## 2021-05-07 NOTE — PROGRESS NOTES
"Client Name: Fiona Camops      Date: 5/7/21    Service Type: Individual (ADHD Evaluation feedback session) - Phone     Session Start Time: 9:00 Session End Time: 9:20      Session Length: 20 minutes      Session #: (feedback)     Attendees: Client attended alone    The patient has been notified of the following:      \"We have found that certain health care needs can be provided without the need for a face to face visit. This service lets us provide the care you need with a phone conversation.       I will have full access to your [facility] medical record during this entire phone call.   I will be taking notes for your medical record.      Since this is like an office visit, we will bill your insurance company for this service.       There are potential benefits and risks of telephone visits (e.g. limits to patient confidentiality) that differ from in-person visits.?  Confidentiality still applies for telephone services, and nobody will record the visit.  It is important to be in a quiet, private space that is free of distractions (including cell phone or other devices) during the visit.??      If during the course of the call I believe a telephone visit is not appropriate, you will not be charged for this service\"     Consent has been obtained for this service by care team member: Yes           DATA        Treatment Objective(s) Addressed in This Session:   Provided feedback on ADHD evaluation. Reviewed test results in depth and answered client's questions. Client diagnosed with Attention-Deficit/Hyperactivity Disorder, Combined Presentation and Other Specified Anxiety Disorder. Reviewed ADHD symptom management handout. This provider also completed full written report of evaluation, including integration of testing data, summary, and recommendations. Please see Documentation Only dated 4/21/21.     Progress on / Status of Treatment Objective(s) / Homework:   Completed      Intervention:  ADHD Evaluation feedback; " Reviewed report (can be found in Documentation Only encounter dated 4/21/21); Client was appreciative of the feedback and expressed understanding of the diagnoses. She plans to talk to her PCP about medication management and will be calling to schedule this appointment.         ASSESSMENT: Current Emotional / Mental Status (status of significant symptoms):  Risk status (Self / Other harm or suicidal ideation)  Client denies current fears or concerns for personal safety.  Client denies current or recent suicidal ideation or behaviors.  Client denies current or recent homicidal ideation or behaviors.  Client denies current or recent self-injurious behavior or ideation.  Client denies other safety concerns.  A safety and risk management plan has not been developed at this time, however client was given the after-hours number / 911 should there be a change in any of these risk factors.     Appearance: Unable to assess on phone   Eye Contact: Unable to assess on phone  Psychomotor Behavior: Unable to assess on phone  Attitude: Cooperative   Orientation: All  Speech  Rate / Production: Normal   Volume: Normal   Mood: Normal  Affect: Appropriate   Thought Content: Clear   Thought Form: Coherent Logical   Insight: Good      Medication Review:  No current psychiatric medications prescribed     Medication Compliance:  NA     Changes in Health Issues:  None reported     Chemical Use Review:  Substance Use: Chemical use reviewed, no active concerns identified      Tobacco Use: No current tobacco use.      Collateral Reports Completed:  Routed note to Care Team Member(s)     PLAN: (Homework, other)       Recommendations:    1. Schedule an appointment with your physician or psychiatrist to discuss a medication evaluation. Medication can be very helpful in treating the symptoms of anxiety and ADHD. It will be important that each condition is treated, as treatment for one without the other may lead to an increase in symptoms (e.g.,  treating ADHD, but not anxiety, can lead to increased anxiety).     2. Access resources through websites, books, and articles such as those provided in the Coping with ADHD handout.     3. Consider working with an ADHD  or individual therapist to learn skills to assist with symptom management, as well as ways to improve relationships, etc., that may have been impacted by your symptoms.     4. If anxiety worsens or becomes difficult to manage, individual therapy is recommended. Therapies focused on identifying and challenging problematic thought and behavior patterns while increasing the use of healthy coping skills has been found to be effective in treating anxiety. It will be important to set goals in this therapy and work actively toward achieving short-term successes that lead to the completion of each goal. Action-oriented therapies, such as CBT and ACT are particularly recommended for the treatment of chronic anxiety.     5. The use of behavioral strategies such as diaphragmatic breathing, guided imagery, and mindfulness is often helpful in the management of anxiety symptoms.     6. You are welcome to schedule a follow-up appointment with me in about 6 weeks to review symptoms, treatment involvement, and struggles and/or successes.        Julienne Rose, Ph.D.,   Licensed Psychologist       Psychological Testing   Billing/Services Summary       Testing Evaluation Services Base: 22355  (1st 60 mins) Add-on: 49883  (each addtl 60 mins)   Record Review and Clarify Referral Question   (9:45/10:00), (3/15/21) 15 minutes   Integration/Report Generation   (3:00/4:00), (3/17/21) - MMPI-2  (11:00/12:00), (4/16/21) - Henrique Scales  (3:00/4:00), (4/21/21) - Report 60 minutes  60 minutes  60 minutes     Interactive Feedback Session  (9:00/9:20), (5/7/21) 20 minutes   Total Time: 215 minutes (3 hours, 35 minutes)   Total Units: 1 3           Diagnosis(es): (ICD-10)  Attention-Deficit/Hyperactivity Disorder,  Combined Presentation (F90.2)  Other Specified Anxiety Disorder (F41.8)

## 2021-05-18 NOTE — PROGRESS NOTES
Fiona is a 36 year old who is being evaluated via a billable video visit.      How would you like to obtain your AVS? MyChart  If the video visit is dropped, the invitation should be resent by: Text to cell phone: [phone]  Will anyone else be joining your video visit? no     Video Start Time: 12:27    Assessment & Plan     ADHD (attention deficit hyperactivity disorder), combined type  Discussed options for medications including stimulant and nonstimulant medications.  Also discussed importance of working with a therapist on lifestyle changes especially around work or school and organization.  We will start with Strattera 40 mg and can increase to 80 mg certainly if this is not covered or not effective can try a stimulant.  - atomoxetine (STRATTERA) 40 MG capsule; Take 1 capsule (40 mg) by mouth daily After 1 week can increase to 80 mg daily for ADHD  - MENTAL HEALTH REFERRAL  - Adult; Outpatient Treatment; Individual/Couples/Family/Group Therapy/Health Psychology[place] [facility] [place] [facility] [phone]; We will contact you to schedule the appointment or please call with any questions      20 minutes spent on the date of the encounter doing chart review, history and exam, documentation and further activities per the note        Follow-up in 4 weeks to see how this is going    Return in about 4 weeks (around 6/16/2021) for ADHD f/u.    Zhane Mckenzie MD  [facility]   Fiona is a 36 year old who presents for the following health issues     HPI     ADHD Initial    Major concerns: ADHD evaluation results,.  Did review the evaluation and she did have findings of combined ADHD with inattention and hyperactivity.  She also was found to have anxiety.  This was not rising to the patient.  She is interested in something that does help with organization as she has had difficulty with this.  She often makes lots of lists and to do items are on it and sometimes loses the list  and has difficulty with concentration and focus.    Symptom Checklist:  Inattentiveness: often having trouble sustaining attention, often having difficulty with organizing tasks and activities, often losing things and often easily distracted.  Hyperactivity: no symptoms.  Impulsivity: no symptoms.  These symptoms are observed at home and at work.  Additional documentation review: Psychiatry Evaluation,    Behavioral history obtained: History of some anxiety and difficulty with concentration and focus with work  Co-Morbid Diagnosis: Anxiety  Currently in counseling: Yes        Family Cardiac history reviewed and is negative.             Patient Active Problem List   Diagnosis     CARDIOVASCULAR SCREENING; LDL GOAL LESS THAN 160     Hashimoto's thyroiditis     Mild intermittent asthma without complication     Family history of malignant neoplasm of breast     ADHD (attention deficit hyperactivity disorder), combined type      Current Outpatient Medications   Medication     atomoxetine (STRATTERA) 40 MG capsule     albuterol (PROAIR HFA/PROVENTIL HFA/VENTOLIN HFA) 108 (90 Base) MCG/ACT inhaler     Cholecalciferol (VITAMIN D-3 PO)     levothyroxine (SYNTHROID/LEVOTHROID) 112 MCG tablet     Probiotic Product (PROBIOTIC DAILY PO)     No current facility-administered medications for this visit.              Review of Systems   Constitutional, HEENT, cardiovascular, pulmonary, gi and gu systems are negative, except as otherwise noted.      Objective           Vitals:  No vitals were obtained today due to virtual visit.    Physical Exam   GENERAL: Healthy, alert and no distress  EYES: Eyes grossly normal to inspection.  No discharge or erythema, or obvious scleral/conjunctival abnormalities.  RESP: No audible wheeze, cough, or visible cyanosis.  No visible retractions or increased work of breathing.    SKIN: Visible skin clear. No significant rash, abnormal pigmentation or lesions.  NEURO: Cranial nerves grossly intact.   Mentation and speech appropriate for age.  PSYCH: Mentation appears normal, affect normal/bright, judgement and insight intact, normal speech and appearance well-groomed.    Results of evaluation reviewed            Video-Visit Details    Type of service:  Video Visit    Video End Time:12:44    Originating Location (pt. Location): Home    Distant Location (provider location):  [facility]     Platform used for Video Visit: [employer]

## 2021-05-19 ENCOUNTER — VIRTUAL VISIT (OUTPATIENT)
Dept: FAMILY MEDICINE | Facility: CLINIC | Age: 36
End: 2021-05-19
Payer: COMMERCIAL

## 2021-05-19 DIAGNOSIS — F90.2 ADHD (ATTENTION DEFICIT HYPERACTIVITY DISORDER), COMBINED TYPE: Primary | ICD-10-CM

## 2021-05-19 PROCEDURE — 99213 OFFICE O/P EST LOW 20 MIN: CPT | Mod: 95 | Performed by: FAMILY MEDICINE

## 2021-05-19 RX ORDER — ATOMOXETINE 40 MG/1
40 CAPSULE ORAL DAILY
Qty: 45 CAPSULE | Refills: 0 | Status: SHIPPED | OUTPATIENT
Start: 2021-05-19 | End: 2021-06-14

## 2021-06-11 DIAGNOSIS — F90.2 ADHD (ATTENTION DEFICIT HYPERACTIVITY DISORDER), COMBINED TYPE: ICD-10-CM

## 2021-06-14 RX ORDER — ATOMOXETINE 40 MG/1
CAPSULE ORAL
Qty: 60 CAPSULE | Refills: 0 | Status: SHIPPED | OUTPATIENT
Start: 2021-06-14 | End: 2021-07-19

## 2021-06-14 NOTE — TELEPHONE ENCOUNTER
Medication is being filled for 1 time refill only due to:  Patient needs to be seen because due for f/u this month.   Kayce VALDERRAMA RN

## 2021-07-16 NOTE — PROGRESS NOTES
Fiona is a 36 year old who is being evaluated via a billable video visit.      How would you like to obtain your AVS? MyChart  If the video visit is dropped, the invitation should be resent by: Text to cell phone: [phone]  Will anyone else be joining your video visit? No     Video Start Time: 12:32    Assessment & Plan     Hashimoto's thyroiditis  Discussed keeping same dose of her thyroid medication last labs were excellent.  She has refills    Mild intermittent asthma without complication  Asthma is stable not an issue currently usually mild and intermittent.    Eczema of hand  Has eczema only of the left hand really affecting the thumb and the index finger and part of the palm.  She does constantly have her hands dirty wearing gloves and is washing them several times throughout the day.  These are all triggers finds it hard to apply lotion or cream has used steroids in the past but has not done this for a while.  We will try this  - triamcinolone (KENALOG) 0.1 % external cream; Apply topically 2 times daily    ADHD (attention deficit hyperactivity disorder), combined type  Did try atomoxetine and had side effects as noted.  Does work early in the morning and then often is up until her kids go to sleep later in the day.  We discussed trying an extended release medication and like to see if Vyvanse works for her.  Discussed that it often works a little bit longer than Adderall and might last that are part of her day.  Reviewed potential side effects and discussed can you come in to discuss and signed controlled substance agreement.  Most importantly reviewed avoiding any other stimulants or any illegal drugs with this medication.  She does have a ADD evaluation on file from [facility]  - lisdexamfetamine (VYVANSE) 10 MG capsule; Take 1 capsule (10 mg) by mouth every morning    Prescription drug management  25 minutes spent on the date of the encounter doing chart review, history and exam, documentation  and further activities per the note        See Patient Instructions    No follow-ups on file.    Zhane Mckenzie MD  [facility] VIVIENNE Jaimes is a 36 year old who presents for the following health issues     HPI     ADHD Follow-Up    Date of last ADHD office visit: 5/19/2021  Status since last visit: Stable  Taking controlled (daily) medications as prescribed: No                       Parent/Patient Concerns with Medications: Patient has been having side effects from the medication has not taken it for 1.5 weeks   Gets nausea and emesis  Tried this for a month and half  Really bad with increased dosage too  Took it with food    Has not been on a stimulant    evaluation was done for ADHD through [facility]    Has tried wellbutrin    Works 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 THEN INCREASE TO 2 CAPSULES(80MG) BY MOUTH DAILY    Class: E-Prescribe    Notes to Pharmacy: 1 month refill only; patient due for appointment (followup)          Sleep: no problems  Home/Family Concerns: Worse   Peer Concerns:     Co-Morbid Diagnosis: None    Currently in counseling:         Medication Benefits:   Controlled symptoms: Attention span, Distractability and Finishing tasks      Medication side effects:  Side effects noted: none              Review of Systems   Constitutional, HEENT, cardiovascular, pulmonary, gi and gu systems are negative, except as otherwise noted.      Objective           Vitals:  No vitals were obtained today due to virtual visit.    Physical Exam   GENERAL: Healthy, alert and no distress  EYES: Eyes grossly normal to inspection.  No discharge or erythema, or obvious scleral/conjunctival abnormalities.  RESP: No audible wheeze, cough, or visible cyanosis.  No visible  retractions or increased work of breathing.    SKIN: Visible skin clear. No significant rash, abnormal pigmentation or lesions.  NEURO: Cranial nerves grossly intact.  Mentation and speech appropriate for age.  PSYCH: Mentation appears normal, affect normal/bright, judgement and insight intact, normal speech and appearance well-groomed.                Video-Visit Details    Type of service:  Video Visit    Video End Time:12:47    Originating Location (pt. Location): Home    Distant Location (provider location):  [facility]     Platform used for Video Visit: [employer]

## 2021-07-19 ENCOUNTER — VIRTUAL VISIT (OUTPATIENT)
Dept: FAMILY MEDICINE | Facility: CLINIC | Age: 36
End: 2021-07-19
Payer: COMMERCIAL

## 2021-07-19 DIAGNOSIS — J45.20 MILD INTERMITTENT ASTHMA WITHOUT COMPLICATION: ICD-10-CM

## 2021-07-19 DIAGNOSIS — E06.3 HASHIMOTO'S THYROIDITIS: ICD-10-CM

## 2021-07-19 DIAGNOSIS — L30.9 ECZEMA OF HAND: ICD-10-CM

## 2021-07-19 DIAGNOSIS — F90.2 ADHD (ATTENTION DEFICIT HYPERACTIVITY DISORDER), COMBINED TYPE: Primary | ICD-10-CM

## 2021-07-19 PROCEDURE — 99214 OFFICE O/P EST MOD 30 MIN: CPT | Mod: 95 | Performed by: FAMILY MEDICINE

## 2021-07-19 RX ORDER — LISDEXAMFETAMINE DIMESYLATE 10 MG/1
10 CAPSULE ORAL EVERY MORNING
Qty: 30 CAPSULE | Refills: 0 | Status: SHIPPED | OUTPATIENT
Start: 2021-07-19 | End: 2021-09-23

## 2021-07-19 RX ORDER — TRIAMCINOLONE ACETONIDE 1 MG/G
CREAM TOPICAL 2 TIMES DAILY
Qty: 30 G | Refills: 0 | Status: SHIPPED | OUTPATIENT
Start: 2021-07-19 | End: 2021-10-25

## 2021-09-04 ENCOUNTER — HEALTH MAINTENANCE LETTER (OUTPATIENT)
Age: 36
End: 2021-09-04

## 2021-09-20 ENCOUNTER — MYC MEDICAL ADVICE (OUTPATIENT)
Dept: FAMILY MEDICINE | Facility: CLINIC | Age: 36
End: 2021-09-20

## 2021-09-20 DIAGNOSIS — F90.2 ADHD (ATTENTION DEFICIT HYPERACTIVITY DISORDER), COMBINED TYPE: ICD-10-CM

## 2021-09-21 NOTE — TELEPHONE ENCOUNTER
Routing refill request to provider for review/approval because:  Drug not on the [facility] refill protocol   Kayce VALDERRAMA RN

## 2021-09-23 RX ORDER — LISDEXAMFETAMINE DIMESYLATE 10 MG/1
10 CAPSULE ORAL EVERY MORNING
Qty: 30 CAPSULE | Refills: 0 | Status: SHIPPED | OUTPATIENT
Start: 2021-09-23 | End: 2023-09-13

## 2021-10-21 ENCOUNTER — MYC MEDICAL ADVICE (OUTPATIENT)
Dept: FAMILY MEDICINE | Facility: CLINIC | Age: 36
End: 2021-10-21

## 2021-10-21 NOTE — PROGRESS NOTES
Assessment & Plan     ADHD (attention deficit hyperactivity disorder), combined type  Reviewed CSA  And urine testing  discussed adjusting the dose and precautions to take with this medication  - lisdexamfetamine (VYVANSE) 30 MG capsule; Take 1 capsule (30 mg) by mouth every morning  - Drug Confirmation Panel Urine with Creat - lab collect; Future    Eczema of hand  Trial of stronger steroid,  Consider jojoba oil or sunflower oil as maintenance in addition to thick emollients  - mometasone (ELOCON) 0.1 % external cream; Apply topically daily for 5 days As needed for eczema    Hashimoto's thyroiditis  recehcking labs  - TSH with free T4 reflex; Future    Mild intermittent asthma without complication  Stable will have ACT checked    Fatigue, unspecified type  Labs to evalaute  - Vitamin B12; Future  - Ferritin; Future  - Iron and iron binding capacity; Future  - Adult Gastro Ref - Consult Only; Future    Irritable bowel syndrome, unspecified type  Referred for discussion of this - has slightly loose stools at times predominantly has bloating    Lichen of skin  Trial of meds for this - bacitracin ti fissures might help it resolve  too  - augmented betamethasone dipropionate (DIPROLENE-AF) 0.05 % external cream; Apply topically 2 times daily for 5 days To rectal area for 3-5 days as needed    Prescription drug management  25 minutes spent on the date of the encounter doing chart review, history and exam, documentation and further activities per the note          See Patient Instructions    No follow-ups on file.    Zhane Mckenzie MD  [facility]    Angel Jaimes is a 36 year old who presents for the following health issues     HPI     ADHD  Feels vyvanse helped a little  No sleep issues or side effects    How many servings of fruits and vegetables do you eat daily?  4 or more    On average, how many sweetened beverages do you drink each day (Examples: soda, juice, sweet tea, etc.  Do  "NOT count diet or artificially sweetened beverages)?   1    How many days per week do you exercise enough to make your heart beat faster? 7    How many minutes a day do you exercise enough to make your heart beat faster? N/A    How many days per week do you miss taking your medication? 0    The patient has been experiencing itching on left hand and  bottom.   Has eczema better with topical steroids but never able to get this to fully resolve.    Also have vaginal/rectal itching. Saw her GYn who diagnosed lichen and prescribed steroids - this helped the labial irritation to resolve but rectal has not.  No related to loose stools or constipation  Was seen with a VV and was given treatment for pinworms.  Tried meds for pinworms - not helpful    Feels thyroid meds might not be working as well - more tired.  Would like recheck on other lab which were off the last time we checked - b12 iron etc.    Asthma is good - no concerns  She already had seasonal flu vaccine    Review of Systems   Constitutional, HEENT, cardiovascular, pulmonary, gi and gu systems are negative, except as otherwise noted.      Objective    Ht 1.67 m (5' 5.75\")   BMI 28.63 kg/m    Body mass index is 28.63 kg/m .  Physical Exam   GENERAL: healthy, alert and no distress  RECTAL (female): normal sphincter tone, no rectal masses  PSYCH: mentation appears normal, affect normal/bright few scattered thickened areas and some superficial fissures noted                "

## 2021-10-22 ASSESSMENT — ASTHMA QUESTIONNAIRES: ACT_TOTALSCORE: 25

## 2021-10-25 ENCOUNTER — OFFICE VISIT (OUTPATIENT)
Dept: FAMILY MEDICINE | Facility: CLINIC | Age: 36
End: 2021-10-25
Payer: COMMERCIAL

## 2021-10-25 VITALS
HEIGHT: 66 IN | BODY MASS INDEX: 34.04 KG/M2 | TEMPERATURE: 97.8 F | SYSTOLIC BLOOD PRESSURE: 115 MMHG | DIASTOLIC BLOOD PRESSURE: 74 MMHG | OXYGEN SATURATION: 98 % | HEART RATE: 91 BPM | WEIGHT: 211.8 LBS

## 2021-10-25 DIAGNOSIS — L28.0 LICHEN OF SKIN: ICD-10-CM

## 2021-10-25 DIAGNOSIS — L30.9 ECZEMA OF HAND: Primary | ICD-10-CM

## 2021-10-25 DIAGNOSIS — R53.83 FATIGUE, UNSPECIFIED TYPE: ICD-10-CM

## 2021-10-25 DIAGNOSIS — J45.20 MILD INTERMITTENT ASTHMA WITHOUT COMPLICATION: ICD-10-CM

## 2021-10-25 DIAGNOSIS — K58.9 IRRITABLE BOWEL SYNDROME, UNSPECIFIED TYPE: ICD-10-CM

## 2021-10-25 DIAGNOSIS — E06.3 HASHIMOTO'S THYROIDITIS: ICD-10-CM

## 2021-10-25 DIAGNOSIS — F90.2 ADHD (ATTENTION DEFICIT HYPERACTIVITY DISORDER), COMBINED TYPE: ICD-10-CM

## 2021-10-25 PROBLEM — L43.9 LICHEN PLANUS: Status: ACTIVE | Noted: 2021-10-25

## 2021-10-25 PROCEDURE — 84443 ASSAY THYROID STIM HORMONE: CPT | Performed by: FAMILY MEDICINE

## 2021-10-25 PROCEDURE — 80307 DRUG TEST PRSMV CHEM ANLYZR: CPT | Performed by: FAMILY MEDICINE

## 2021-10-25 PROCEDURE — 82607 VITAMIN B-12: CPT | Performed by: FAMILY MEDICINE

## 2021-10-25 PROCEDURE — 82570 ASSAY OF URINE CREATININE: CPT | Performed by: FAMILY MEDICINE

## 2021-10-25 PROCEDURE — 99214 OFFICE O/P EST MOD 30 MIN: CPT | Performed by: FAMILY MEDICINE

## 2021-10-25 PROCEDURE — 36415 COLL VENOUS BLD VENIPUNCTURE: CPT | Performed by: FAMILY MEDICINE

## 2021-10-25 PROCEDURE — 83550 IRON BINDING TEST: CPT | Performed by: FAMILY MEDICINE

## 2021-10-25 PROCEDURE — 82728 ASSAY OF FERRITIN: CPT | Performed by: FAMILY MEDICINE

## 2021-10-25 RX ORDER — LISDEXAMFETAMINE DIMESYLATE 30 MG/1
30 CAPSULE ORAL EVERY MORNING
Qty: 30 CAPSULE | Refills: 0 | Status: SHIPPED | OUTPATIENT
Start: 2021-10-25 | End: 2021-12-03

## 2021-10-25 RX ORDER — BETAMETHASONE DIPROPIONATE 0.5 MG/G
CREAM TOPICAL 2 TIMES DAILY
Qty: 15 G | Refills: 2 | Status: SHIPPED | OUTPATIENT
Start: 2021-10-25 | End: 2021-10-30

## 2021-10-25 RX ORDER — MOMETASONE FUROATE 1 MG/G
CREAM TOPICAL DAILY
Qty: 45 G | Refills: 0 | Status: SHIPPED | OUTPATIENT
Start: 2021-10-25 | End: 2021-10-30

## 2021-10-25 ASSESSMENT — MIFFLIN-ST. JEOR: SCORE: 1668.47

## 2021-10-25 NOTE — PATIENT INSTRUCTIONS
For rectal itchin) try bacitracin or any antibacterial ointemnt first then apply the stoid cream.  The small fissures I saw are basically tiny little cuts possible fro scratching.  The bacitracin will help these to heal.  2) Warm tub soaks can also help increase blood flow to the area to promote healing.  3) if this is not helping we can have you see dermatology   remember to use the steroid creams sparingly to avoid thinning the skin.

## 2021-10-25 NOTE — LETTER
[facility] [place]  10/25/21  Patient: Awilda Campos  YOB: 1985  Medical Record Number: [id number]                                                                                  Non-Opioid Controlled Substance Agreement    This is an agreement between you and your provider regarding safe and appropriate use of controlled substances prescribed by your care team. Controlled substances are?medicines that can cause physical and mental dependence (abuse).     There are strict laws about having and using these medicines. We here at [facility] are  committed to working with you in your efforts to get better. To support you in this work, we'll help you schedule regular office appointments for medicine refills. If we must cancel or change your appointment for any reason, we'll make sure you have enough medicine to last until your next appointment.     As a Provider, I will:     Listen carefully to your concerns while treating you with respect.     Recommend a treatment plan that I believe is in your best interest and may involve therapies other than medicine.      Talk with you often about the possible benefits and the risk of harm of any medicine that we prescribe for you.    Assess the safety of this medicine and check how well it works.      Provide a plan on how to taper (discontinue or go off) using this medicine if the decision is made to stop its use.      ::  As a Patient, I understand controlled substances:       Are prescribed by my care provider to help me function or work and manage my condition(s).?    Are strong medicines and can cause serious side effects.       Need to be taken exactly as prescribed.?Combining controlled substances with certain medicines or chemicals (such as illegal drugs, alcohol, sedatives, sleeping pills, and benzodiazepines) can be dangerous or even fatal.? If I stop taking my medicines suddenly, I may have severe withdrawal symptoms.     The risks,  benefits, and side effects of these medicine(s) were explained to me. I agree that:    1. I will take part in other treatments as advised by my care team. This may be psychiatry or counseling, physical therapy, behavioral therapy, group treatment or a referral to specialist.    2. I will keep all my appointments and understand this is part of the monitoring of controlled substances.?My care team may require an office visit for EVERY controlled substance refill. If I miss appointments or don t follow instructions, my care team may stop my medicine    3. I will take my medicines as prescribed. I will not change the dose or schedule unless my care team tells me to. There will be no refills if I run out early.      4. I may be asked to come to the clinic and complete a urine drug test or complete a pill count. If I don t give a urine sample or participate in a pill count, the care team may stop my medicine.    5. I will only receive controlled substance prescriptions from this clinic. If I am treated by another provider, I will tell them that I am taking controlled substances and that I have a treatment agreement with this provider. I will inform my [facility] care team within one business day if I am given a prescription for any controlled substance by another healthcare provider. My [facility] care team can contact other providers and pharmacists about my use of any medicines.    6. It is up to me to make sure that I don't run out of my medicines on weekends or holidays.?If my care team is willing to refill my prescription without a visit, I must request refills only during office hours. Refills may take up to 3 business days to process. I will use one pharmacy to fill all my controlled substance prescriptions. I will notify the clinic about any changes to my insurance or medicine availability.    7. I am responsible for my prescriptions. If the medicine/prescription is lost, stolen or destroyed, it will  not be replaced.?I also agree not to share controlled substance medicines with anyone.     8. I am aware I should not use any illegal or recreational drugs. I agree not to drink alcohol unless my care team says I can.     9. If I enroll in the [place] Medical Cannabis program, I will tell my care team before my next refill.    10. I will tell my care team right away if I become pregnant, have a new medical problem treated outside of my regular clinic, or have a change in my medicines.     11. I understand that this medicine can affect my thinking, judgment and reaction time.? Alcohol and drugs affect the brain and body, which can affect the safety of my driving. Being under the influence of alcohol or drugs can affect my decision-making, behaviors, personal safety and the safety of others. Driving while impaired (DWI) can occur if a person is driving, operating or in physical control of a car, motorcycle, boat, snowmobile, ATV, motorbike, off-road vehicle or any other motor vehicle ([place] Statute 169A.20). I understand the risk if I choose to drive or operate any vehicle or machinery.    I understand that if I do not follow any of the conditions above, my prescriptions or treatment may be stopped or changed.   I agree that my provider, clinic care team and pharmacy may work with any city, state or federal law enforcement agency that investigates the misuse, sale or other diversion of my controlled medicine. I will allow my provider to discuss my care with, or share a copy of, this agreement with any other treating provider, pharmacy or emergency room where I receive care.     I have read this agreement and have asked questions about anything I did not understand.    ________________________________________________________  Patient Signature - Awilda Campos     ___________________                   Date     ________________________________________________________  Provider Signature - Zhane Mckenzie MD        ___________________                   Date     ________________________________________________________  Witness Signature (required if provider not present while patient signing)          ___________________                   Date

## 2021-10-26 LAB
CREAT UR-MCNC: 143 MG/DL
FERRITIN SERPL-MCNC: 29 NG/ML (ref 12–150)
IRON SATN MFR SERPL: 25 % (ref 15–46)
IRON SERPL-MCNC: 64 UG/DL (ref 35–180)
TIBC SERPL-MCNC: 255 UG/DL (ref 240–430)
TSH SERPL DL<=0.005 MIU/L-ACNC: 1.39 MU/L (ref 0.4–4)
VIT B12 SERPL-MCNC: 273 PG/ML (ref 193–986)

## 2021-10-28 LAB
AMPHET UR CFM-MCNC: 628 NG/ML
AMPHET/CREAT UR: 439 NG/MG {CREAT}

## 2021-11-01 NOTE — RESULT ENCOUNTER NOTE
Hello,    Great news, your results were normal.  The labs show that your thyroid is well controlled.  The iron stores are still at the low end of normal.  Over-the-counter ferrous gluconate or ferrous sulfate 325 mg once daily with orange juice usually will raise these levels up however if you have try this please let me know and we can talk about other options.    Your vitamin B12 level was at the lower end of normal.  For this I would try taking B12 supplements daily and if you have already tried this then certainly let me know as we can talk about injectable B12.  Usually the oral dose is 1000 mg daily.    Zhane Mckenzie MD

## 2021-12-03 ENCOUNTER — MYC REFILL (OUTPATIENT)
Dept: FAMILY MEDICINE | Facility: CLINIC | Age: 36
End: 2021-12-03
Payer: COMMERCIAL

## 2021-12-03 DIAGNOSIS — F90.2 ADHD (ATTENTION DEFICIT HYPERACTIVITY DISORDER), COMBINED TYPE: ICD-10-CM

## 2021-12-06 RX ORDER — LISDEXAMFETAMINE DIMESYLATE 30 MG/1
30 CAPSULE ORAL EVERY MORNING
Qty: 30 CAPSULE | Refills: 0 | Status: SHIPPED | OUTPATIENT
Start: 2021-12-06 | End: 2023-09-13

## 2022-03-28 ENCOUNTER — OFFICE VISIT (OUTPATIENT)
Dept: FAMILY MEDICINE | Facility: CLINIC | Age: 37
End: 2022-03-28
Payer: COMMERCIAL

## 2022-03-28 VITALS
OXYGEN SATURATION: 98 % | DIASTOLIC BLOOD PRESSURE: 71 MMHG | HEART RATE: 39 BPM | WEIGHT: 174.3 LBS | HEIGHT: 66 IN | SYSTOLIC BLOOD PRESSURE: 116 MMHG | BODY MASS INDEX: 28.01 KG/M2 | TEMPERATURE: 97.8 F

## 2022-03-28 DIAGNOSIS — N89.8 VAGINAL ITCHING: Primary | ICD-10-CM

## 2022-03-28 DIAGNOSIS — N76.0 BV (BACTERIAL VAGINOSIS): ICD-10-CM

## 2022-03-28 DIAGNOSIS — O99.820 GBS (GROUP B STREPTOCOCCUS CARRIER), +RV CULTURE, CURRENTLY PREGNANT: ICD-10-CM

## 2022-03-28 DIAGNOSIS — R30.0 DYSURIA: ICD-10-CM

## 2022-03-28 DIAGNOSIS — B96.89 BV (BACTERIAL VAGINOSIS): ICD-10-CM

## 2022-03-28 DIAGNOSIS — E06.3 HASHIMOTO'S THYROIDITIS: ICD-10-CM

## 2022-03-28 LAB
ALBUMIN UR-MCNC: NEGATIVE MG/DL
APPEARANCE UR: CLEAR
BACTERIA #/AREA URNS HPF: ABNORMAL /HPF
BILIRUB UR QL STRIP: NEGATIVE
CLUE CELLS: PRESENT
COLOR UR AUTO: YELLOW
GLUCOSE UR STRIP-MCNC: NEGATIVE MG/DL
HGB UR QL STRIP: ABNORMAL
KETONES UR STRIP-MCNC: NEGATIVE MG/DL
LEUKOCYTE ESTERASE UR QL STRIP: ABNORMAL
NITRATE UR QL: NEGATIVE
PH UR STRIP: 7 [PH] (ref 5–7)
RBC #/AREA URNS AUTO: ABNORMAL /HPF
SP GR UR STRIP: 1.02 (ref 1–1.03)
SQUAMOUS #/AREA URNS AUTO: ABNORMAL /LPF
TRICHOMONAS, WET PREP: ABNORMAL
UROBILINOGEN UR STRIP-ACNC: 0.2 E.U./DL
WBC #/AREA URNS AUTO: ABNORMAL /HPF
WBC'S/HIGH POWER FIELD, WET PREP: ABNORMAL
YEAST, WET PREP: ABNORMAL

## 2022-03-28 PROCEDURE — 87210 SMEAR WET MOUNT SALINE/INK: CPT | Performed by: FAMILY MEDICINE

## 2022-03-28 PROCEDURE — 87086 URINE CULTURE/COLONY COUNT: CPT | Performed by: FAMILY MEDICINE

## 2022-03-28 PROCEDURE — 99213 OFFICE O/P EST LOW 20 MIN: CPT | Performed by: FAMILY MEDICINE

## 2022-03-28 PROCEDURE — 81001 URINALYSIS AUTO W/SCOPE: CPT | Performed by: FAMILY MEDICINE

## 2022-03-28 PROCEDURE — 87088 URINE BACTERIA CULTURE: CPT | Performed by: FAMILY MEDICINE

## 2022-03-28 RX ORDER — METRONIDAZOLE 500 MG/1
500 TABLET ORAL 2 TIMES DAILY
Qty: 14 TABLET | Refills: 0 | Status: SHIPPED | OUTPATIENT
Start: 2022-03-28 | End: 2022-04-04

## 2022-03-28 RX ORDER — LEVOTHYROXINE SODIUM 112 UG/1
112 TABLET ORAL DAILY
Qty: 90 TABLET | Refills: 3 | Status: SHIPPED | OUTPATIENT
Start: 2022-03-28 | End: 2023-03-27

## 2022-03-28 NOTE — PROGRESS NOTES
"  Assessment & Plan     Vaginal itching  BV noted will treat for this and await culture  - UA with Microscopic reflex to Culture; Future  - UA with Microscopic reflex to Culture  - Urine Microscopic  - Urine Culture  - Wet prep - Clinic Collect  - metroNIDAZOLE (FLAGYL) 500 MG tablet; Take 1 tablet (500 mg) by mouth 2 times daily for 7 days    Dysuria  Awaiting culture    Hashimoto's thyroiditis  Meds refilled  - levothyroxine (SYNTHROID/LEVOTHROID) 112 MCG tablet; Take 1 tablet (112 mcg) by mouth daily    BV (bacterial vaginosis)     - metroNIDAZOLE (FLAGYL) 500 MG tablet; Take 1 tablet (500 mg) by mouth 2 times daily for 7 days    Prescription drug management  15 minutes spent on the date of the encounter doing chart review, history and exam, documentation and further activities per the note        BMI:   Estimated body mass index is 28.25 kg/m  as calculated from the following:    Height as of this encounter: 1.673 m (5' 5.87\").    Weight as of this encounter: 79.1 kg (174 lb 4.8 oz).       See Patient Instructions    No follow-ups on file.    Zhane Mckenzie MD  [facility]OBHA Jaimes is a 37 year old who presents for the following health issues  accompanied by her self.    History of Present Illness       Reason for visit:  Possible UTI  Symptom onset:  1-3 days ago  Symptoms include:  Burning/itching/pain  Symptom intensity:  Mild  Symptom progression:  Improving  Had these symptoms before:  No    She eats 4 or more servings of fruits and vegetables daily.She consumes 2 sweetened beverage(s) daily.She exercises with enough effort to increase her heart rate 20 to 29 minutes per day.  She exercises with enough effort to increase her heart rate 3 or less days per week. She is missing 2 dose(s) of medications per week.     Feeling need to void more urgency    Vaginal Symptoms  Onset/Duration: 2 days ago   Description:  Vaginal Discharge: none   Itching (Pruritis): YES  Burning " "sensation:  YES  Odor: no  Accompanying Signs & Symptoms:  Urinary symptoms: no  Abdominal pain: no  Fever: no  History:   Sexually active: YES  New Partner: no  Possibility of Pregnancy:  no  Recent antibiotic use: no  Previous vaginitis issues: no  Precipitating or alleviating factors: None  Therapies tried and outcome: Monistat          Review of Systems   Constitutional, HEENT, cardiovascular, pulmonary, gi and gu systems are negative, except as otherwise noted.      Objective    /71   Pulse (!) 39   Temp 97.8  F (36.6  C) (Oral)   Ht 1.673 m (5' 5.87\")   Wt 79.1 kg (174 lb 4.8 oz)   LMP 03/17/2022   SpO2 98%   BMI 28.25 kg/m    Body mass index is 28.25 kg/m .  Physical Exam   GENERAL: healthy, alert and no distress   (female): normal female external genitalia, normal urethral meatus , vaginal mucosa pink, moist, well rugated and vaginal discharge - white    Results for orders placed or performed in visit on 03/28/22 (from the past 24 hour(s))   UA with Microscopic reflex to Culture    Specimen: Urine, Midstream   Result Value Ref Range    Color Urine Yellow Colorless, Straw, Light Yellow, Yellow    Appearance Urine Clear Clear    Glucose Urine Negative Negative mg/dL    Bilirubin Urine Negative Negative    Ketones Urine Negative Negative mg/dL    Specific Gravity Urine 1.020 1.003 - 1.035    Blood Urine Trace (A) Negative    pH Urine 7.0 5.0 - 7.0    Protein Albumin Urine Negative Negative mg/dL    Urobilinogen Urine 0.2 0.2, 1.0 E.U./dL    Nitrite Urine Negative Negative    Leukocyte Esterase Urine Large (A) Negative   Urine Microscopic   Result Value Ref Range    Bacteria Urine Moderate (A) None Seen /HPF    RBC Urine 0-2 0-2 /HPF /HPF    WBC Urine 10-25 (A) 0-5 /HPF /HPF    Squamous Epithelials Urine Few (A) None Seen /LPF   Wet prep - Clinic Collect    Specimen: Vagina; Swab   Result Value Ref Range    Trichomonas Absent Absent    Yeast Absent Absent    Clue Cells Present (A) Absent    " WBCs/high power field 1+ (A) None

## 2022-03-28 NOTE — RESULT ENCOUNTER NOTE
Hello,    The wet prep showed bacterial vaginosis - this is common to happen after a yeast infection OR may have been the cause for the initial irritation.  Regardless I will send in an oral tablet to take twice daily for 7 days - this sill help clear this up    Still awaiting urine culture results.    Take care,    Zhane Mckenzie MD

## 2022-03-29 LAB — BACTERIA UR CULT: ABNORMAL

## 2022-03-31 RX ORDER — CEFDINIR 300 MG/1
300 CAPSULE ORAL 2 TIMES DAILY
Qty: 14 CAPSULE | Refills: 0 | Status: SHIPPED | OUTPATIENT
Start: 2022-03-31 | End: 2022-04-07

## 2022-03-31 NOTE — RESULT ENCOUNTER NOTE
Hello,    The urine culture shows a type of common bacteria called group B strep.  This usually responds to amoxicillin or cephalosporins - I will send this in    Let me know if you have concerns,    Zhane Mckenzie MD

## 2022-04-16 ENCOUNTER — HEALTH MAINTENANCE LETTER (OUTPATIENT)
Age: 37
End: 2022-04-16

## 2022-10-22 ENCOUNTER — HEALTH MAINTENANCE LETTER (OUTPATIENT)
Age: 37
End: 2022-10-22

## 2023-03-16 ENCOUNTER — E-VISIT (OUTPATIENT)
Dept: URGENT CARE | Facility: URGENT CARE | Age: 38
End: 2023-03-16
Payer: COMMERCIAL

## 2023-03-16 DIAGNOSIS — L24.9 IRRITANT CONTACT DERMATITIS, UNSPECIFIED TRIGGER: Primary | ICD-10-CM

## 2023-03-16 PROCEDURE — 99421 OL DIG E/M SVC 5-10 MIN: CPT

## 2023-03-16 RX ORDER — TRIAMCINOLONE ACETONIDE 1 MG/G
OINTMENT TOPICAL 2 TIMES DAILY
Qty: 80 G | Refills: 1 | Status: SHIPPED | OUTPATIENT
Start: 2023-03-16 | End: 2023-04-06

## 2023-03-17 NOTE — PATIENT INSTRUCTIONS
Dear Awilda Campos    After reviewing your responses, I've been able to diagnose you with dermatitis, which is a common skin condition that causes a rash on your skin. The exact cause is unknown but your risk may increase if you have allergies, smoke, or have other skin conditions. Some foods such as mushrooms, chocolate and coffee also are known potential triggers.     Based on your responses, I have prescribed a cream to treat this. Please follow the instructions on the medication. If you experience irritation of your skin, new rash, or any other new symptoms, you should stop using this medication and contact your primary care provider.     If this treatment does not work for you or you will run out of refills, please plan to follow- up with your primary care provider to set refills for a longer period of time or to try other options.     Things you can do to help prevent this:     Do not scratch your rash.Bacteria from your fingernails may enter your open sores during scratching and cause an infection.     Use moisturizes or emollients, such as petroleum jelly.These help relieve itching and help prevent bacteria from getting in your sores. If you have a doctor's order for medicated cream, apply that first. Then apply the moisturizer or emollient on top.    Thanks for choosing us as your health care partner,    SHILO Lafleur CNP

## 2023-04-06 ENCOUNTER — OFFICE VISIT (OUTPATIENT)
Dept: FAMILY MEDICINE | Facility: CLINIC | Age: 38
End: 2023-04-06
Payer: COMMERCIAL

## 2023-04-06 ENCOUNTER — PATIENT OUTREACH (OUTPATIENT)
Dept: ONCOLOGY | Facility: CLINIC | Age: 38
End: 2023-04-06

## 2023-04-06 VITALS
SYSTOLIC BLOOD PRESSURE: 112 MMHG | DIASTOLIC BLOOD PRESSURE: 76 MMHG | RESPIRATION RATE: 16 BRPM | TEMPERATURE: 97.5 F | HEIGHT: 66 IN | WEIGHT: 188.6 LBS | OXYGEN SATURATION: 97 % | HEART RATE: 63 BPM | BODY MASS INDEX: 30.31 KG/M2

## 2023-04-06 DIAGNOSIS — J45.20 MILD INTERMITTENT ASTHMA WITHOUT COMPLICATION: ICD-10-CM

## 2023-04-06 DIAGNOSIS — Z80.3 FAMILY HISTORY OF MALIGNANT NEOPLASM OF BREAST: ICD-10-CM

## 2023-04-06 DIAGNOSIS — L24.9 IRRITANT CONTACT DERMATITIS, UNSPECIFIED TRIGGER: ICD-10-CM

## 2023-04-06 DIAGNOSIS — F90.2 ADHD (ATTENTION DEFICIT HYPERACTIVITY DISORDER), COMBINED TYPE: ICD-10-CM

## 2023-04-06 DIAGNOSIS — E06.3 HASHIMOTO'S THYROIDITIS: ICD-10-CM

## 2023-04-06 DIAGNOSIS — Z11.59 NEED FOR HEPATITIS C SCREENING TEST: ICD-10-CM

## 2023-04-06 DIAGNOSIS — R10.2 PELVIC PAIN IN FEMALE: ICD-10-CM

## 2023-04-06 DIAGNOSIS — Z00.00 ROUTINE GENERAL MEDICAL EXAMINATION AT A HEALTH CARE FACILITY: Primary | ICD-10-CM

## 2023-04-06 DIAGNOSIS — N83.8 OVARIAN MASS: ICD-10-CM

## 2023-04-06 LAB
HCV AB SERPL QL IA: NONREACTIVE
TSH SERPL DL<=0.005 MIU/L-ACNC: 4.19 UIU/ML (ref 0.3–4.2)

## 2023-04-06 PROCEDURE — 86803 HEPATITIS C AB TEST: CPT | Performed by: FAMILY MEDICINE

## 2023-04-06 PROCEDURE — 36415 COLL VENOUS BLD VENIPUNCTURE: CPT | Performed by: FAMILY MEDICINE

## 2023-04-06 PROCEDURE — 91312 COVID-19 VACCINE BIVALENT BOOSTER 12+ (PFIZER): CPT | Performed by: FAMILY MEDICINE

## 2023-04-06 PROCEDURE — 0124A COVID-19 VACCINE BIVALENT BOOSTER 12+ (PFIZER): CPT | Performed by: FAMILY MEDICINE

## 2023-04-06 PROCEDURE — 84443 ASSAY THYROID STIM HORMONE: CPT | Performed by: FAMILY MEDICINE

## 2023-04-06 PROCEDURE — 99395 PREV VISIT EST AGE 18-39: CPT | Mod: 25 | Performed by: FAMILY MEDICINE

## 2023-04-06 PROCEDURE — 99213 OFFICE O/P EST LOW 20 MIN: CPT | Mod: 25 | Performed by: FAMILY MEDICINE

## 2023-04-06 RX ORDER — DEXTROAMPHETAMINE SACCHARATE, AMPHETAMINE ASPARTATE MONOHYDRATE, DEXTROAMPHETAMINE SULFATE AND AMPHETAMINE SULFATE 2.5; 2.5; 2.5; 2.5 MG/1; MG/1; MG/1; MG/1
10 CAPSULE, EXTENDED RELEASE ORAL DAILY
Qty: 30 CAPSULE | Refills: 0 | Status: SHIPPED | OUTPATIENT
Start: 2023-04-06

## 2023-04-06 RX ORDER — LEVOTHYROXINE SODIUM 112 UG/1
112 TABLET ORAL DAILY
Qty: 90 TABLET | Refills: 3 | Status: SHIPPED | OUTPATIENT
Start: 2023-04-06 | End: 2023-04-13

## 2023-04-06 RX ORDER — TRIAMCINOLONE ACETONIDE 1 MG/G
OINTMENT TOPICAL 2 TIMES DAILY
Qty: 80 G | Refills: 1 | Status: SHIPPED | OUTPATIENT
Start: 2023-04-06

## 2023-04-06 RX ORDER — ALBUTEROL SULFATE 90 UG/1
2 AEROSOL, METERED RESPIRATORY (INHALATION) EVERY 6 HOURS PRN
Qty: 18 G | Refills: 3 | Status: SHIPPED | OUTPATIENT
Start: 2023-04-06

## 2023-04-06 ASSESSMENT — ENCOUNTER SYMPTOMS
SORE THROAT: 0
COUGH: 0
FEVER: 0
DYSURIA: 0
MYALGIAS: 0
NERVOUS/ANXIOUS: 0
NAUSEA: 0
HEADACHES: 0
EYE PAIN: 0
CONSTIPATION: 0
ARTHRALGIAS: 0
PARESTHESIAS: 0
CHILLS: 0
DIZZINESS: 0
HEMATURIA: 0
BREAST MASS: 0
JOINT SWELLING: 0
HEARTBURN: 0
HEMATOCHEZIA: 0
WEAKNESS: 0
FREQUENCY: 0
ABDOMINAL PAIN: 1
PALPITATIONS: 0
SHORTNESS OF BREATH: 0
DIARRHEA: 0

## 2023-04-06 ASSESSMENT — ANXIETY QUESTIONNAIRES
3. WORRYING TOO MUCH ABOUT DIFFERENT THINGS: NOT AT ALL
GAD7 TOTAL SCORE: 3
5. BEING SO RESTLESS THAT IT IS HARD TO SIT STILL: SEVERAL DAYS
GAD7 TOTAL SCORE: 3
1. FEELING NERVOUS, ANXIOUS, OR ON EDGE: NOT AT ALL
8. IF YOU CHECKED OFF ANY PROBLEMS, HOW DIFFICULT HAVE THESE MADE IT FOR YOU TO DO YOUR WORK, TAKE CARE OF THINGS AT HOME, OR GET ALONG WITH OTHER PEOPLE?: SOMEWHAT DIFFICULT
7. FEELING AFRAID AS IF SOMETHING AWFUL MIGHT HAPPEN: NOT AT ALL
6. BECOMING EASILY ANNOYED OR IRRITABLE: SEVERAL DAYS
2. NOT BEING ABLE TO STOP OR CONTROL WORRYING: NOT AT ALL
GAD7 TOTAL SCORE: 3
4. TROUBLE RELAXING: SEVERAL DAYS
IF YOU CHECKED OFF ANY PROBLEMS ON THIS QUESTIONNAIRE, HOW DIFFICULT HAVE THESE PROBLEMS MADE IT FOR YOU TO DO YOUR WORK, TAKE CARE OF THINGS AT HOME, OR GET ALONG WITH OTHER PEOPLE: SOMEWHAT DIFFICULT
7. FEELING AFRAID AS IF SOMETHING AWFUL MIGHT HAPPEN: NOT AT ALL

## 2023-04-06 ASSESSMENT — PATIENT HEALTH QUESTIONNAIRE - PHQ9
SUM OF ALL RESPONSES TO PHQ QUESTIONS 1-9: 0
10. IF YOU CHECKED OFF ANY PROBLEMS, HOW DIFFICULT HAVE THESE PROBLEMS MADE IT FOR YOU TO DO YOUR WORK, TAKE CARE OF THINGS AT HOME, OR GET ALONG WITH OTHER PEOPLE: NOT DIFFICULT AT ALL
SUM OF ALL RESPONSES TO PHQ QUESTIONS 1-9: 0

## 2023-04-06 ASSESSMENT — ASTHMA QUESTIONNAIRES
QUESTION_4 LAST FOUR WEEKS HOW OFTEN HAVE YOU USED YOUR RESCUE INHALER OR NEBULIZER MEDICATION (SUCH AS ALBUTEROL): ONCE A WEEK OR LESS
ACT_TOTALSCORE: 22
QUESTION_3 LAST FOUR WEEKS HOW OFTEN DID YOUR ASTHMA SYMPTOMS (WHEEZING, COUGHING, SHORTNESS OF BREATH, CHEST TIGHTNESS OR PAIN) WAKE YOU UP AT NIGHT OR EARLIER THAN USUAL IN THE MORNING: NOT AT ALL
QUESTION_2 LAST FOUR WEEKS HOW OFTEN HAVE YOU HAD SHORTNESS OF BREATH: ONCE OR TWICE A WEEK
ACT_TOTALSCORE: 22
QUESTION_1 LAST FOUR WEEKS HOW MUCH OF THE TIME DID YOUR ASTHMA KEEP YOU FROM GETTING AS MUCH DONE AT WORK, SCHOOL OR AT HOME: NONE OF THE TIME
QUESTION_5 LAST FOUR WEEKS HOW WOULD YOU RATE YOUR ASTHMA CONTROL: WELL CONTROLLED

## 2023-04-06 ASSESSMENT — PAIN SCALES - GENERAL: PAINLEVEL: NO PAIN (0)

## 2023-04-06 NOTE — PATIENT INSTRUCTIONS

## 2023-04-06 NOTE — NURSING NOTE
Prior to immunization administration, verified patients identity using patient s name and date of birth. Please see Immunization Activity for additional information.     Screening Questionnaire for Adult Immunization    Are you sick today?   No   Do you have allergies to medications, food, a vaccine component or latex?   No   Have you ever had a serious reaction after receiving a vaccination?   No   Do you have a long-term health problem with heart, lung, kidney, or metabolic disease (e.g., diabetes), asthma, a blood disorder, no spleen, complement component deficiency, a cochlear implant, or a spinal fluid leak?  Are you on long-term aspirin therapy?   No   Do you have cancer, leukemia, HIV/AIDS, or any other immune system problem?   No   Do you have a parent, brother, or sister with an immune system problem?   No   In the past 3 months, have you taken medications that affect  your immune system, such as prednisone, other steroids, or anticancer drugs; drugs for the treatment of rheumatoid arthritis, Crohn s disease, or psoriasis; or have you had radiation treatments?   No   Have you had a seizure, or a brain or other nervous system problem?   No   During the past year, have you received a transfusion of blood or blood    products, or been given immune (gamma) globulin or antiviral drug?   No   For women: Are you pregnant or is there a chance you could become       pregnant during the next month?   No   Have you received any vaccinations in the past 4 weeks?   No     Immunization questionnaire answers were all negative.      Injection of pfizer bivalent given by Oh Norris MA. Patient instructed to remain in clinic for 15 minutes afterwards, and to report any adverse reactions.     Screening performed by Oh Norris MA on 4/6/2023 at 2:18 PM.

## 2023-04-06 NOTE — PROGRESS NOTES
elvic ultra   SUBJECTIVE:   CC: Fiona is an 38 year old who presents for preventive health visit.        View : No data to display.              Healthy Habits:     Getting at least 3 servings of Calcium per day:  Yes    Bi-annual eye exam:  NO    Dental care twice a year:  Yes    Sleep apnea or symptoms of sleep apnea:  None    Diet:  Other    Frequency of exercise:  2-3 days/week    Duration of exercise:  15-30 minutes    Taking medications regularly:  No    Barriers to taking medications:  Problems remembering to take them    Medication side effects:  None    PHQ-2 Total Score: 0    Additional concerns today:  Yes  Answers for HPI/ROS submitted by the patient on 4/6/2023  If you checked off any problems, how difficult have these problems made it for you to do your work, take care of things at home, or get along with other people?: Not difficult at all  PHQ9 TOTAL SCORE: 0  ROWAN 7 TOTAL SCORE: 3  Wt Readings from Last 4 Encounters:   04/06/23 85.5 kg (188 lb 9.6 oz)   03/28/22 79.1 kg (174 lb 4.8 oz)   10/25/21 96.1 kg (211 lb 12.8 oz)   02/11/21 79.8 kg (176 lb)       Patient had lots of gas and bloating has gained weight.  Feels that this is new in the last few months.        (Z00.00) Routine general medical examination at a health care facility  (primary encounter diagnosis)  Comment:   Plan:     (F90.2) ADHD (attention deficit hyperactivity disorder), combined type  Comment: would like to restart medications.  Had used 10 mg extended release and this worked pretty well.  Plan: amphetamine-dextroamphetamine (ADDERALL XR) 10         MG 24 hr capsule            (E06.3) Hashimoto's thyroiditis  Comment: Has medications at home has been taking 112 mcg due for repeat labs today feels stable in terms of weight  Plan: TSH WITH FREE T4 REFLEX, levothyroxine         (SYNTHROID/LEVOTHROID) 112 MCG tablet            (J45.20) Mild intermittent asthma without complication  Comment:   Plan: albuterol (PROAIR HFA/PROVENTIL  HFA/VENTOLIN         HFA) 108 (90 Base) MCG/ACT inhaler            (Z80.3) Family history of malignant neoplasm of breast  Comment: Her mother did have breast cancer at 40.  She has had breast screening ever since the age of 31 no one in the family has had genetic counseling.  We discussed doing this today.  She is not sure if her mother had the option of genetic link when she had cancer.  She is currently alive.  There was mention of a maternal great aunt also had breast cancer and possibly ovarian cancer no other family members noted however  Plan: [place] Screen Bilateral w/[place], Cancer Risk         Mgmt/Cancer Genetic Counseling Referral            (L24.9) Irritant contact dermatitis, unspecified trigger  Comment:   Plan: triamcinolone (KENALOG) 0.1 % external ointment            (Z11.59) Need for hepatitis C screening test  Comment:   Plan: Hepatitis C Screen Reflex to HCV RNA Quant and         Genotype              Today's PHQ-2 Score:       4/6/2023     9:17 AM   PHQ-2 ( 1999 Pfizer)   Q1: Little interest or pleasure in doing things 0   Q2: Feeling down, depressed or hopeless 0   PHQ-2 Score 0   Q1: Little interest or pleasure in doing things Not at all   Q2: Feeling down, depressed or hopeless Not at all   PHQ-2 Score 0           Social History     Tobacco Use     Smoking status: Never     Smokeless tobacco: Never   Vaping Use     Vaping status: Not on file   Substance Use Topics     Alcohol use: Yes     Comment: very infrequently             4/6/2023     9:17 AM   Alcohol Use   Prescreen: >3 drinks/day or >7 drinks/week? No          View : No data to display.              Reviewed orders with patient.  Reviewed health maintenance and updated orders accordingly - Yes  Lab work is in process    Breast Cancer Screening:    [facility]HS-7:       2/11/2021     7:29 AM 4/6/2023     9:18 AM   Breast CA Risk Assessment ([facility]HS-7)   Did any of your first-degree relatives have breast or ovarian cancer? Yes Yes   Did any of your  relatives have bilateral breast cancer? No Unknown   Did any man in your family have breast cancer? No No   Did any woman in your family have breast and ovarian cancer? Yes Yes   Did any woman in your family have breast cancer before age 50 y? Yes Yes   Do you have 2 or more relatives with breast and/or ovarian cancer? Unknown Yes   Do you have 2 or more relatives with breast and/or bowel cancer? Unknown Unknown         Pertinent mammograms are reviewed under the imaging tab.    History of abnormal Pap smear: NO - age 30-65 PAP every 5 years with negative HPV co-testing recommended      Latest Ref Rng & Units 3/8/2019     9:00 AM 3/7/2019     2:16 PM   PAP / HPV   PAP (Historical)   NIL     HPV 16 DNA NEG^Negative Negative      HPV 18 DNA NEG^Negative Negative      Other HR HPV NEG^Negative Negative        Reviewed and updated as needed this visit by clinical staff                  Reviewed and updated as needed this visit by Provider                 Past Medical History:   Diagnosis Date     Asthma      Hashimoto's disease      Lichen sclerosus 11/2019     Uncomplicated asthma     Uses an inhaler as needed      Past Surgical History:   Procedure Laterality Date     NO HISTORY OF SURGERY         Review of Systems   Constitutional: Negative for chills and fever.   HENT: Negative for congestion, ear pain, hearing loss and sore throat.    Eyes: Negative for pain and visual disturbance.   Respiratory: Negative for cough and shortness of breath.    Cardiovascular: Negative for chest pain, palpitations and peripheral edema.   Gastrointestinal: Positive for abdominal pain. Negative for constipation, diarrhea, heartburn, hematochezia and nausea.   Breasts:  Negative for tenderness, breast mass and discharge.   Genitourinary: Positive for vaginal bleeding. Negative for dysuria, frequency, genital sores, hematuria, pelvic pain, urgency and vaginal discharge.   Musculoskeletal: Negative for arthralgias, joint swelling and  "myalgias.   Skin: Positive for rash.   Neurological: Negative for dizziness, weakness, headaches and paresthesias.   Psychiatric/Behavioral: Negative for mood changes. The patient is not nervous/anxious.           OBJECTIVE:   /76   Pulse 63   Temp 97.5  F (36.4  C) (Temporal)   Resp 16   Ht 1.671 m (5' 5.79\")   Wt 85.5 kg (188 lb 9.6 oz)   LMP 03/30/2023 (Within Days)   SpO2 97%   BMI 30.64 kg/m    Physical Exam  GENERAL: healthy, alert and no distress  EYES: Eyes grossly normal to inspection, PERRL and conjunctivae and sclerae normal  HENT: ear canals and TM's normal, nose and mouth without ulcers or lesions  NECK: no adenopathy, no asymmetry, masses, or scars and thyroid normal to palpation  RESP: lungs clear to auscultation - no rales, rhonchi or wheezes  BREAST: normal without masses, tenderness or nipple discharge and no palpable axillary masses or adenopathy  CV: regular rate and rhythm, normal S1 S2, no S3 or S4, no murmur, click or rub, no peripheral edema and peripheral pulses strong  ABDOMEN: tenderness RLQ and bowel sounds normal   (female): normal female external genitalia, normal urethral meatus , vaginal mucosa pink, moist, well rugated, normal cervix, adnexae, and uterus without masses. and tenderness is noted at central pelvic and right sided pelvic area.  No masses felt no rebound or guarding noted.  MS: no gross musculoskeletal defects noted, no edema  SKIN: no suspicious lesions or rashes  NEURO: Normal strength and tone, mentation intact and speech normal  PSYCH: mentation appears normal, affect normal/bright    Diagnostic Test Results:  Labs reviewed in Epic    ASSESSMENT/PLAN:   (Z00.00) Routine general medical examination at a health care facility  (primary encounter diagnosis)  Comment:    Plan:      (R10.2) Pelvic pain in female  Comment: We will send for pelvic ultrasound for further evaluation.  Gas and bloating could be related to diet but will ensure that there are no " pelvic findings.  Plan: US Pelvic Complete with Transvaginal            (F90.2) ADHD (attention deficit hyperactivity disorder), combined type  Comment:    Plan: amphetamine-dextroamphetamine (ADDERALL XR) 10         MG 24 hr capsule             (E06.3) Hashimoto's thyroiditis  Comment:    Plan: TSH WITH FREE T4 REFLEX, levothyroxine         (SYNTHROID/LEVOTHROID) 112 MCG tablet             (J45.20) Mild intermittent asthma without complication  Comment:    Plan: albuterol (PROAIR HFA/PROVENTIL HFA/VENTOLIN         HFA) 108 (90 Base) MCG/ACT inhaler             (Z80.3) Family history of malignant neoplasm of breast  Comment: Discussed importance of genetic screening especially for mother is never had screening.  Likely she had cancer before this was available.  There is a remote great aunt with ovarian cancer otherwise no other first or second-degree relatives other than her mother.  Encouraged [place] screening  Plan: [place] Screen Bilateral w/Garrett, Cancer Risk         Mgmt/Cancer Genetic Counseling Referral            (L24.9) Irritant contact dermatitis, unspecified trigger  Comment:   Plan: triamcinolone (KENALOG) 0.1 % external ointment            (Z11.59) Need for hepatitis C screening test  Comment:   Plan: Hepatitis C Screen Reflex to HCV RNA Quant and         Genotype                    COUNSELING:  Reviewed preventive health counseling, as reflected in patient instructions        She reports that she has never smoked. She has never used smokeless tobacco.      Zhane Mckenzie MD  [facility] [place]

## 2023-04-06 NOTE — PROGRESS NOTES
New Patient Oncology Nurse Navigator Note     Referring provider: Zhane Mckenzie MD    Referring Clinic/Organization: [facility]     Referred to (specialty:) Genetic Counseling     Date Referral Received: April 6, 2023     Clinical History (per Nurse review of records provided):       Her mother did have breast cancer at 40.  She has had breast screening ever since the age of 31 no one in the family has had genetic counseling.  She is not sure if her mother had the option of genetic link when she had cancer.  She is currently alive.  There was mention of a maternal great aunt also had breast cancer and possibly ovarian cancer.    Writer received referral, reviewed for appropriate plan, and transferred to New Patient Scheduling for completion.

## 2023-04-13 DIAGNOSIS — E06.3 HASHIMOTO'S THYROIDITIS: Primary | ICD-10-CM

## 2023-04-13 RX ORDER — LEVOTHYROXINE SODIUM 125 UG/1
125 TABLET ORAL DAILY
Qty: 90 TABLET | Refills: 3 | Status: SHIPPED | OUTPATIENT
Start: 2023-04-13 | End: 2023-09-13 | Stop reason: DRUGHIGH

## 2023-04-13 NOTE — RESULT ENCOUNTER NOTE
Hello,    The tsh levels were at the upper limit f normal - I think you'd do better with a higher dose of levothyroxine  I sent in 125 mcg.    We can recheck tsh in 3-4 months.    [place],    Zhane Mckenzie MD

## 2023-05-02 ENCOUNTER — ANCILLARY PROCEDURE (OUTPATIENT)
Dept: ULTRASOUND IMAGING | Facility: CLINIC | Age: 38
End: 2023-05-02
Attending: FAMILY MEDICINE
Payer: COMMERCIAL

## 2023-05-02 ENCOUNTER — HOSPITAL ENCOUNTER (OUTPATIENT)
Dept: MAMMOGRAPHY | Facility: CLINIC | Age: 38
Discharge: HOME | End: 2023-05-02
Attending: FAMILY MEDICINE
Payer: COMMERCIAL

## 2023-05-02 DIAGNOSIS — R10.2 PELVIC PAIN IN FEMALE: ICD-10-CM

## 2023-05-02 DIAGNOSIS — Z80.3 FAMILY HISTORY OF MALIGNANT NEOPLASM OF BREAST: ICD-10-CM

## 2023-05-02 PROCEDURE — 77067 SCR MAMMO BI INCL CAD: CPT

## 2023-05-02 PROCEDURE — 76856 US EXAM PELVIC COMPLETE: CPT

## 2023-05-03 NOTE — RESULT ENCOUNTER NOTE
I called the patient about these results.    She is feeling lots of bloating  would like to figure ut this more presently    Will order MRI and have her schedule    SN

## 2023-05-15 ENCOUNTER — ANCILLARY PROCEDURE (OUTPATIENT)
Dept: MRI IMAGING | Facility: CLINIC | Age: 38
End: 2023-05-15
Attending: FAMILY MEDICINE
Payer: COMMERCIAL

## 2023-05-15 DIAGNOSIS — N83.8 OVARIAN MASS: ICD-10-CM

## 2023-05-15 PROCEDURE — 72197 MRI PELVIS W/O & W/DYE: CPT

## 2023-05-15 PROCEDURE — 255N000002 HC RX 255 OP 636: Performed by: FAMILY MEDICINE

## 2023-05-15 PROCEDURE — A9585 GADOBUTROL INJECTION: HCPCS | Performed by: FAMILY MEDICINE

## 2023-05-15 RX ORDER — GADOBUTROL 604.72 MG/ML
8.5 INJECTION INTRAVENOUS ONCE
Status: COMPLETED | OUTPATIENT
Start: 2023-05-15 | End: 2023-05-15

## 2023-05-15 RX ADMIN — GADOBUTROL 8.5 ML: 604.72 INJECTION INTRAVENOUS at 08:58

## 2023-05-16 NOTE — RESULT ENCOUNTER NOTE
Hello,    The right sided ovarian lesion is gone!  Very good  There is a new left sided lesion that looks like a cyst now - it is 2.7cm and appears to be due to ovulation.  We can recheck an ultrasound inn 2 months.  This is not concerning though.  The fluid that was noted in the pelvis is also resolved.    Zhane Mckenzie MD

## 2023-07-07 ENCOUNTER — OFFICE VISIT (OUTPATIENT)
Dept: PODIATRY | Facility: CLINIC | Age: 38
End: 2023-07-07
Payer: COMMERCIAL

## 2023-07-07 VITALS — BODY MASS INDEX: 30.54 KG/M2 | HEART RATE: 87 BPM | WEIGHT: 188 LBS | OXYGEN SATURATION: 98 %

## 2023-07-07 DIAGNOSIS — M77.51 BURSITIS OF INTERMETATARSAL BURSA OF RIGHT FOOT: Primary | ICD-10-CM

## 2023-07-07 DIAGNOSIS — G57.91 NEURITIS OF RIGHT FOOT: ICD-10-CM

## 2023-07-07 PROCEDURE — 99203 OFFICE O/P NEW LOW 30 MIN: CPT | Performed by: PODIATRIST

## 2023-07-07 NOTE — LETTER
7/7/2023         RE: Awilda Campos  [address] [facility] [address]        Dear Colleague,    Thank you for referring your patient, Awilda Campos, to the [facility]. Please see a copy of my visit note below.    Assessment:      ICD-10-CM    1. Bursitis of intermetatarsal bursa of right foot  M77.51 diclofenac (VOLTAREN) 50 MG EC tablet      2. Neuritis of right foot  G57.91 diclofenac (VOLTAREN) 50 MG EC tablet           Plan:  No orders of the defined types were placed in this encounter.      Discussed the etiology and treatment of the condition with the patient.  Imaging studies reviewed and discussed with the patient.  Discussed surgical and conservative options.      -Injection- discussed if needed  -NSAID- Rx po  Met pads, shoe changes    Standing x-rays if not improved  MRI discussed if needed        Return:  No follow-ups on file.    Judie Sargent DPM                Chief Complaint:     Patient presents with:  Right Foot - Pain     right foot pain    HPI:  Awilda Campos is a 38 year old year old female who presents for evaluation of foot pain.      Pain location- ball of the foot   4 weeks   No numbness in toes  No swelling that she can tell    Past Medical & Surgical History:  Past Medical History:   Diagnosis Date     Asthma      Hashimoto's disease      Lichen sclerosus 11/2019     Uncomplicated asthma     Uses an inhaler as needed      Past Surgical History:   Procedure Laterality Date     NO HISTORY OF SURGERY        Family History   Problem Relation Age of Onset     Breast Cancer Mother 40     Hyperlipidemia Mother      Arthritis Father      Hypertension Father      Kidney Disease Father      Asthma Brother         sports induced asthma     Eye Disorder Maternal Grandmother         cataracts     Diabetes Maternal Grandfather      Arthritis Paternal Grandmother      Osteoporosis Paternal Grandmother      Heart Disease Paternal Grandfather         pace  maker     Gastrointestinal Disease Other         self, hx of stomach issues        Social History:  ?  History   Smoking Status     Never   Smokeless Tobacco     Never     History   Drug Use No     Social History    Substance and Sexual Activity      Alcohol use: Yes        Comment: very infrequently      Allergies:  ?   Allergies   Allergen Reactions     No Known Allergies         Medications:    Current Outpatient Medications   Medication     albuterol (PROAIR HFA/PROVENTIL HFA/VENTOLIN HFA) 108 (90 Base) MCG/ACT inhaler     amphetamine-dextroamphetamine (ADDERALL XR) 10 MG 24 hr capsule     Cholecalciferol (VITAMIN D-3 PO)     diclofenac (VOLTAREN) 50 MG EC tablet     levothyroxine (SYNTHROID/LEVOTHROID) 125 MCG tablet     lisdexamfetamine (VYVANSE) 10 MG capsule     lisdexamfetamine (VYVANSE) 30 MG capsule     Probiotic Product (PROBIOTIC DAILY PO)     triamcinolone (KENALOG) 0.1 % external ointment     No current facility-administered medications for this visit.       Physical Exam:  ?  Vitals:  Pulse 87   Wt 85.3 kg (188 lb)   SpO2 98%   BMI 30.54 kg/m     General:  WD/WN, in NAD.  A&O x3.  Dermatologic:    Skin is intact, open lesions absent.   Skin texture, turgor is normal.  Vascular:  Pulses palpable.  Digital capillary refill time normal.  Skin temperature is normal.  Generalized edema- none.  Focal edema- mild 4th IMS right.  Neurologic:    Gross sensation normal.  Gait and balance normal.  Musculoskeletal:  Maximal pain to palpation of 3rd IMS right.  no pain to palpation of sub 3rd, 4th MTPs, dorsal shaft 3rd, 4th mets right.    Lesser MTP ROM full, pain free right.    Muscle strength 5/5  foot and ankle bilateral.    Stance:  RCSP Valgus bilateral.    Clinical deformity: tailor's bunion b/l                  Again, thank you for allowing me to participate in the care of your patient.        Sincerely,        Judie Sargent DPM

## 2023-07-07 NOTE — PATIENT INSTRUCTIONS
"PATIENT INSTRUCTIONS - Podiatry / Foot & Ankle Surgery    Diclofenac / Voltaren - 1 pill twice daily x1 week.  Then take a 1 week break.  Repeat as needed.  Take with food & an acid blocker if stomach upset occurs.  Stop all other NSAIDs (aspirin, ibuprofen/Motrin, naproxen/ Aleve).      Shoes with non-tapering toe box - teva, birkenstock, haflinger, philip, crocs, keen, altra, mary, etc      Closed shoes/ boots:  Place metatarsal pads on an orthotic or shoe liner, adhesive side down.    The pad should contact your foot BEHIND the metatarsophalangeal joints (\"MPJs\" or \"ball\") of your foot.   If the pad sits beneath the joints themselves, it may actually increase pain.  Use glue to prevent shifting of the metatarsal pads on the orthotic / shoe liner.  Available on [email or web address] (Blue PPT metatarsal pads).    SuperFeet orthotics are available on [employer], at [place].     Place the metatarsal pads on the SuperFeet to ensure they are in the exact location needed to float the MPJs  You can then move the SuperFeet with metatarsal pads between different shoes.  Berry or blue color      Pedagz - removable met pads      Xray, cortisone if not improved            "

## 2023-07-07 NOTE — PROGRESS NOTES
Assessment:      ICD-10-CM    1. Bursitis of intermetatarsal bursa of right foot  M77.51 diclofenac (VOLTAREN) 50 MG EC tablet      2. Neuritis of right foot  G57.91 diclofenac (VOLTAREN) 50 MG EC tablet           Plan:  No orders of the defined types were placed in this encounter.      Discussed the etiology and treatment of the condition with the patient.  Imaging studies reviewed and discussed with the patient.  Discussed surgical and conservative options.      -Injection- discussed if needed  -NSAID- Rx po  Met pads, shoe changes    Standing x-rays if not improved  MRI discussed if needed        Return:  No follow-ups on file.    Judie Sargent DPM                Chief Complaint:     Patient presents with:  Right Foot - Pain     right foot pain    HPI:  Awilda Campos is a 38 year old year old female who presents for evaluation of foot pain.      Pain location- ball of the foot   4 weeks   No numbness in toes  No swelling that she can tell    Past Medical & Surgical History:  Past Medical History:   Diagnosis Date     Asthma      Hashimoto's disease      Lichen sclerosus 11/2019     Uncomplicated asthma     Uses an inhaler as needed      Past Surgical History:   Procedure Laterality Date     NO HISTORY OF SURGERY        Family History   Problem Relation Age of Onset     Breast Cancer Mother 40     Hyperlipidemia Mother      Arthritis Father      Hypertension Father      Kidney Disease Father      Asthma Brother         sports induced asthma     Eye Disorder Maternal Grandmother         cataracts     Diabetes Maternal Grandfather      Arthritis Paternal Grandmother      Osteoporosis Paternal Grandmother      Heart Disease Paternal Grandfather         pace maker     Gastrointestinal Disease Other         self, hx of stomach issues        Social History:  ?  History   Smoking Status     Never   Smokeless Tobacco     Never     History   Drug Use No     Social History    Substance and Sexual Activity       Alcohol use: Yes        Comment: very infrequently      Allergies:  ?   Allergies   Allergen Reactions     No Known Allergies         Medications:    Current Outpatient Medications   Medication     albuterol (PROAIR HFA/PROVENTIL HFA/VENTOLIN HFA) 108 (90 Base) MCG/ACT inhaler     amphetamine-dextroamphetamine (ADDERALL XR) 10 MG 24 hr capsule     Cholecalciferol (VITAMIN D-3 PO)     diclofenac (VOLTAREN) 50 MG EC tablet     levothyroxine (SYNTHROID/LEVOTHROID) 125 MCG tablet     lisdexamfetamine (VYVANSE) 10 MG capsule     lisdexamfetamine (VYVANSE) 30 MG capsule     Probiotic Product (PROBIOTIC DAILY PO)     triamcinolone (KENALOG) 0.1 % external ointment     No current facility-administered medications for this visit.       Physical Exam:  ?  Vitals:  Pulse 87   Wt 85.3 kg (188 lb)   SpO2 98%   BMI 30.54 kg/m     General:  WD/WN, in NAD.  A&O x3.  Dermatologic:    Skin is intact, open lesions absent.   Skin texture, turgor is normal.  Vascular:  Pulses palpable.  Digital capillary refill time normal.  Skin temperature is normal.  Generalized edema- none.  Focal edema- mild 4th IMS right.  Neurologic:    Gross sensation normal.  Gait and balance normal.  Musculoskeletal:  Maximal pain to palpation of 3rd IMS right.  no pain to palpation of sub 3rd, 4th MTPs, dorsal shaft 3rd, 4th mets right.    Lesser MTP ROM full, pain free right.    Muscle strength 5/5  foot and ankle bilateral.    Stance:  RCSP Valgus bilateral.    Clinical deformity: tailor's bunion b/l

## 2023-08-16 ENCOUNTER — VIRTUAL VISIT (OUTPATIENT)
Dept: ONCOLOGY | Facility: CLINIC | Age: 38
End: 2023-08-16
Attending: FAMILY MEDICINE
Payer: COMMERCIAL

## 2023-08-16 DIAGNOSIS — G57.91 NEURITIS OF RIGHT FOOT: ICD-10-CM

## 2023-08-16 DIAGNOSIS — Z80.3 FAMILY HISTORY OF MALIGNANT NEOPLASM OF BREAST: Primary | ICD-10-CM

## 2023-08-16 DIAGNOSIS — M77.51 BURSITIS OF INTERMETATARSAL BURSA OF RIGHT FOOT: ICD-10-CM

## 2023-08-16 PROCEDURE — 96040 HC GENETIC COUNSELING, EACH 30 MINUTES: CPT | Mod: GT,95 | Performed by: GENETIC COUNSELOR, MS

## 2023-08-16 NOTE — NURSING NOTE
Is the patient currently in the state of [place]? YES    Visit mode:VIDEO    If the visit is dropped, the patient can be reconnected by: VIDEO VISIT: Text to cell phone: [phone]    Will anyone else be joining the visit? NO      How would you like to obtain your AVS? MyChart    Are changes needed to the allergy or medication list? NO    Reason for visit: SALO GORDILLO

## 2023-08-16 NOTE — LETTER
2023    Awilda Campos  [address] [facility] [address]      Dear Awilda,    It was a pleasure speaking with you over video on 2023. Here is a copy of the progress note from our discussion. If you have any additional questions, please feel free to call.    Referring Provider: Zhane Mckenzie MD    Presenting Information:   I met with Awilda for her video genetic counseling visit, through the Cancer Risk Management Program, to discuss her family history of breast cancer. Today we reviewed this history, cancer screening recommendations, and available genetic testing options.    Personal History:  Awilda is a 38 year old year old female. She does not have any personal history of cancer. She had her first menstrual period at age 13, her first child at age 30, and is premenopausal. Awilda has her ovaries, fallopian tubes and uterus in place, and she has had no ovarian cancer screening to date. She reports that she has not used hormone replacement therapy. Her most recent mammogram in 2021 was normal. Awilda has not had a colonoscopy. She does not regularly do any other cancer screening at this time.     Family History: (Please see scanned pedigree for detailed family history information)  Awilda's mother was diagnosed with breast cancer at age 40.  A maternal aunt was diagnosed with non-melanoma skin cancer in her 40's.  Awilda's maternal grandmother's sister was diagnosed with and  from an unknown cancer in her 60's.  There is no reported family history of cancer on her paternal side of the family.  Her maternal ethnicity is [place], [place], and [place]. Her paternal ethnicity is [place] and [place]. There is no known Ashkenazi Scientology ancestry on either side of her family. There is no reported consanguinity.    Discussion:  Awilda's family history of breast cancer is suggestive of a hereditary cancer syndrome.  We reviewed the features of sporadic, familial, and  hereditary cancers. We discussed that mutations in either BRCA1 or BRCA2 could be possible hereditary explanations for her family history of cancer. Mutations in the BRCA1 or BRCA2 gene are known to cause Hereditary Breast and Ovarian Cancer Syndrome (HBOC). HBOC typically presents with multiple family members diagnosed with breast cancer before age 50 and/or ovarian cancer. Other cancer risks associated with HBOC include male breast cancer, prostate cancer, pancreatic cancer, and melanoma.   We discussed the natural history and genetics of hereditary cancer. A detailed handout regarding hereditary cancer, along with the other information we discussed, will be mailed to Awilda at the end of our appointment today and can be found in the after visit summary. Topics included: inheritance pattern, cancer risks, cancer screening recommendations, and also risks, benefits and limitations of testing.  Based on her personal and family history, Awilda meets current National Comprehensive Cancer Network (NCCN) criteria for genetic testing of BRCA1/2 along with other high-penetrance breast cancer susceptibility genes (I.e. CDH1, PALB2, PTEN, and TP53).  We discussed that there are additional genes that could cause increased risk for breast cancer. As many of these genes present with overlapping features in a family and accurate cancer risk cannot always be established based upon the pedigree analysis alone, it would be reasonable for Awilda to consider panel genetic testing to analyze multiple genes at once.  Genetic testing is available for BRCA1/2 as part of the patient's core panel. This will then be automatically reflexed to [facility]'s Common Hereditary Cancers panel.  Genetic testing is available for 47 genes associated with hereditary cancer: Common Hereditary Cancers panel (APC, ALBERT, AXIN2, BARD1, BMPR1A, BRCA1, BRCA2, BRIP1, CDH1, CDK4, CDKN2A, CHEK2, CTNNA1, DICER1, EPCAM, GREM1, HOXB13, KIT, MEN1, MLH1, MSH2, MSH3,  MSH6, MUTYH, NBN, NF1, NTHL1, PALB2, PDGFRA, PMS2, POLD1, POLE, PTEN, RAD50, RAD51C, RAD51D, SDHA, SDHB, SDHC, SDHD, SMAD4, SMARCA4, STK11, TP53, TSC1, TSC2, and VHL).  We discussed that many of the genes in the Common Hereditary Cancers panel are associated with specific hereditary cancer syndromes and published management guidelines: Hereditary Breast and Ovarian Cancer syndrome (BRCA1, BRCA2), Larsen syndrome (MLH1, MSH2, MSH6, PMS2, EPCAM), Familial Adenomatous Polyposis (APC), Hereditary Diffuse Gastric Cancer (CDH1), Familial Atypical Multiple Mole Melanoma syndrome (CDK4, CDKN2A), Juvenile Polyposis syndrome (BMPR1A, SMAD4), Cowden syndrome (PTEN), Li Fraumeni syndrome (TP53), Peutz-Jeghers syndrome (STK11), MUTYH Associated Polyposis (MUTYH), Tuberous Sclerosis complex (TSC1, TSC2), Neurofibromatosis type 1 (NF1), Multiple Endocrine Neoplasia type 1 (MEN1), Hereditary Paraganglioma and Pheochromocytoma (SDHA, SDHB, SDHC, SDHD), and von Hippel-Lindau (VHL).   The ALBERT, AXIN2, BRIP1, CHEK2, GREM1, MSH3, NBN, NTHL1, PALB2, POLD1, POLE, RAD51C, and RAD51D genes are associated with increased cancer risk and have published management guidelines for certain cancers.    The remaining genes (BARD1, CTNNA1, DICER1, HOXB13, KIT, PDGFRA, RAD50, and SMARCA4) are associated with increased cancer risk and may allow us to make medical recommendations when mutations are identified.    Awilda stated that she would prefer to submit a saliva kit for her genetic testing. [facility] will send a kit directly to her home with directions on how to collect a saliva sample. We discussed that there is a small chance for sample failure due to contamination of the sample. To help minimize this, she should follow the directions that are sent with the kit. Awilda verbalized understanding of this. Once the sample is collected, she will send it to [facility] using the return envelope and prepaid shipping label.   Verbal consent was given over  video and written on the consent form. Turnaround time is approximately 4 weeks once the lab receives the sample.  Medical Management: For Awilda, we reviewed that the information from genetic testing may determine:  additional cancer screening for which Awilda may qualify (i.e. mammogram and breast MRI, more frequent colonoscopies, more frequent dermatologic exams, etc.),  options for risk reducing surgeries Awilda could consider (i.e. bilateral mastectomy, surgery to remove her ovaries and/or uterus, etc.),    and targeted chemotherapies if she were to develop certain cancers in the future (i.e. immunotherapy for individuals with Larsen syndrome, PARP inhibitors, etc.).   These recommendations and possible targeted chemotherapies will be discussed in detail once genetic testing is completed.     Plan:  1) Today Awilda elected to proceed with BRCA1/2 testing with automatic reflex to [facility]'s Common Hereditary Cancers panel.  2) A copy of the consent form and the after visit summary will be mailed to Awilda.  3) This information should be available in approximately 4 weeks, once the lab receives the sample.  4) I will call Awilda with the results once they become available.    Time spent on video: 24 minutes    Marvin Law MS, [facility]  Licensed, Certified Genetic Counselor

## 2023-08-16 NOTE — PROGRESS NOTES
2023    Referring Provider: Zhane Mckenzie MD    Presenting Information:   I met with Awilda for her video genetic counseling visit, through the Cancer Risk Management Program, to discuss her family history of breast cancer. Today we reviewed this history, cancer screening recommendations, and available genetic testing options.    Personal History:  Awilda is a 38 year old year old female. She does not have any personal history of cancer. She had her first menstrual period at age 13, her first child at age 30, and is premenopausal. Awilda has her ovaries, fallopian tubes and uterus in place, and she has had no ovarian cancer screening to date. She reports that she has not used hormone replacement therapy. Her most recent mammogram in 2021 was normal. Awilda has not had a colonoscopy. She does not regularly do any other cancer screening at this time.     Family History: (Please see scanned pedigree for detailed family history information)  Awilda's mother was diagnosed with breast cancer at age 40.  A maternal aunt was diagnosed with non-melanoma skin cancer in her 40's.  Awilda's maternal grandmother's sister was diagnosed with and  from an unknown cancer in her 60's.  There is no reported family history of cancer on her paternal side of the family.  Her maternal ethnicity is Khmer, [place] Islander, and Maori. Her paternal ethnicity is Khmer and Maltese. There is no known Ashkenazi Confucianism ancestry on either side of her family. There is no reported consanguinity.    Discussion:  Awilda's family history of breast cancer is suggestive of a hereditary cancer syndrome.  We reviewed the features of sporadic, familial, and hereditary cancers. We discussed that mutations in either BRCA1 or BRCA2 could be possible hereditary explanations for her family history of cancer. Mutations in the BRCA1 or BRCA2 gene are known to cause Hereditary Breast and Ovarian Cancer Syndrome (HBOC). HBOC typically  presents with multiple family members diagnosed with breast cancer before age 50 and/or ovarian cancer. Other cancer risks associated with HBOC include male breast cancer, prostate cancer, pancreatic cancer, and melanoma.   We discussed the natural history and genetics of hereditary cancer. A detailed handout regarding hereditary cancer, along with the other information we discussed, will be mailed to Awilda at the end of our appointment today and can be found in the after visit summary. Topics included: inheritance pattern, cancer risks, cancer screening recommendations, and also risks, benefits and limitations of testing.  Based on her personal and family history, Awilda meets current National Comprehensive Cancer Network (NCCN) criteria for genetic testing of BRCA1/2 along with other high-penetrance breast cancer susceptibility genes (I.e. CDH1, PALB2, PTEN, and TP53).  We discussed that there are additional genes that could cause increased risk for breast cancer. As many of these genes present with overlapping features in a family and accurate cancer risk cannot always be established based upon the pedigree analysis alone, it would be reasonable for Awilda to consider panel genetic testing to analyze multiple genes at once.  Genetic testing is available for BRCA1/2 as part of the patient's core panel. This will then be automatically reflexed to [facility]'s Common Hereditary Cancers panel.  Genetic testing is available for 47 genes associated with hereditary cancer: Common Hereditary Cancers panel (APC, ALBERT, AXIN2, BARD1, BMPR1A, BRCA1, BRCA2, BRIP1, CDH1, CDK4, CDKN2A, CHEK2, CTNNA1, DICER1, EPCAM, GREM1, HOXB13, KIT, MEN1, MLH1, MSH2, MSH3, MSH6, MUTYH, NBN, NF1, NTHL1, PALB2, PDGFRA, PMS2, POLD1, POLE, PTEN, RAD50, RAD51C, RAD51D, SDHA, SDHB, SDHC, SDHD, SMAD4, SMARCA4, STK11, TP53, TSC1, TSC2, and VHL).  We discussed that many of the genes in the Common Hereditary Cancers panel are associated with specific  hereditary cancer syndromes and published management guidelines: Hereditary Breast and Ovarian Cancer syndrome (BRCA1, BRCA2), Larsen syndrome (MLH1, MSH2, MSH6, PMS2, EPCAM), Familial Adenomatous Polyposis (APC), Hereditary Diffuse Gastric Cancer (CDH1), Familial Atypical Multiple Mole Melanoma syndrome (CDK4, CDKN2A), Juvenile Polyposis syndrome (BMPR1A, SMAD4), Cowden syndrome (PTEN), Li Fraumeni syndrome (TP53), Peutz-Jeghers syndrome (STK11), MUTYH Associated Polyposis (MUTYH), Tuberous Sclerosis complex (TSC1, TSC2), Neurofibromatosis type 1 (NF1), Multiple Endocrine Neoplasia type 1 (MEN1), Hereditary Paraganglioma and Pheochromocytoma (SDHA, SDHB, SDHC, SDHD), and von Hippel-Lindau (VHL).   The ALBERT, AXIN2, BRIP1, CHEK2, GREM1, MSH3, NBN, NTHL1, PALB2, POLD1, POLE, RAD51C, and RAD51D genes are associated with increased cancer risk and have published management guidelines for certain cancers.    The remaining genes (BARD1, CTNNA1, DICER1, HOXB13, KIT, PDGFRA, RAD50, and SMARCA4) are associated with increased cancer risk and may allow us to make medical recommendations when mutations are identified.    Awilda stated that she would prefer to submit a saliva kit for her genetic testing. [facility] will send a kit directly to her home with directions on how to collect a saliva sample. We discussed that there is a small chance for sample failure due to contamination of the sample. To help minimize this, she should follow the directions that are sent with the kit. Awilda verbalized understanding of this. Once the sample is collected, she will send it to [facility] using the return envelope and prepaid shipping label.   Verbal consent was given over video and written on the consent form. Turnaround time is approximately 4 weeks once the lab receives the sample.  Medical Management: For Awilda, we reviewed that the information from genetic testing may determine:  additional cancer screening for which Awilda may qualify  (i.e. mammogram and breast MRI, more frequent colonoscopies, more frequent dermatologic exams, etc.),  options for risk reducing surgeries Awilda could consider (i.e. bilateral mastectomy, surgery to remove her ovaries and/or uterus, etc.),    and targeted chemotherapies if she were to develop certain cancers in the future (i.e. immunotherapy for individuals with Larsen syndrome, PARP inhibitors, etc.).   These recommendations and possible targeted chemotherapies will be discussed in detail once genetic testing is completed.     Plan:  1) Today Awilda elected to proceed with BRCA1/2 testing with automatic reflex to [facility]'s Common Hereditary Cancers panel.  2) A copy of the consent form and the after visit summary will be mailed to Awilda.  3) This information should be available in approximately 4 weeks, once the lab receives the sample.  4) I will call Awilda with the results once they become available.    Time spent on video: 24 minutes    Marvin Law MS, JD [facility]  Licensed, Certified Genetic Counselor      Virtual Visit Details    Type of service:  Video Visit     Originating Location (pt. Location): Home  Distant Location (provider location):  Off-site  Platform used for Video Visit: Lois

## 2023-08-16 NOTE — Clinical Note
8/16/2023         RE: Awilda Campos  [address] [address]        Dear Colleague,    Thank you for referring your patient, Awilda Campos, to the [facility]. Please see a copy of my visit note below.    No notes on file    Again, thank you for allowing me to participate in the care of your patient.        Sincerely,        Marvin Law, GC

## 2023-08-18 NOTE — TELEPHONE ENCOUNTER
Request for Diclofenac 50mg.  Last prescription dated 7/7/23  Sig: take 1 tab twice daily x 7 days, take 1 week break, repeat as needed, #28, 2 refills.   Patient should have enough to get through to 9/29/23.     Request denied.     MAYRA Rolon RN

## 2023-08-28 NOTE — PATIENT INSTRUCTIONS
Assessing Cancer Risk  Cancer is a common diagnosis which impacts many families.  Individuals may develop cancer due to environmental factors (such as exposures and lifestyle), aging, genetic predisposition, or a combination of these factors.      Only about 5-10% of cancers are thought to be due to an inherited cancer susceptibility gene.    These families often have:  Several people with the same or related types of cancer  Cancers diagnosed at a young age (before age 50)  Individuals with more than one primary cancer  Multiple generations of the family affected with cancer    Comprehensive Breast and Gynecologic Cancer Panel  We each inherit two copies of every gene in our bodies: one from our mother, and one from our father. Each gene has a specific job to do.  When a gene has a mistake or  mutation  in it, it does not work like it should.     Some people may be candidates for genetic testing of more than one gene.  For these families, genetic testing using a cancer panel may be offered. These panels will test different genes at once known to increase the risk for breast, ovarian, uterine, and/or other cancers.    This handout will review common hereditary breast and gynecologic cancer syndromes. The genes that will be discussed in this handout are: ALBERT, BRCA1, BRCA2, BRIP1, CDH1, CHEK2, MLH1, MSH2, MSH6, PMS2, EPCAM, PTEN, PALB2, RAD51C, RAD51D, and TP53.    The purpose of this handout is to serve as a brief summary of the breast and gynecologic cancer risk genes that have published clinical management guidelines for individuals who are found to carry a mutation. Inheriting a mutation does not mean a person will develop cancer, but it does significantly increase their risk above the general population risk.     ______________________________________________________________________________    Hereditary Breast and Ovarian Cancer Syndrome (BRCA1 and BRCA2)  A single mutation in one of the copies of BRCA1 or  BRCA2 increases the risk for breast and ovarian cancer, among others.  The risk for pancreatic cancer and melanoma may also be slightly increased in some families.  The chart below shows the chance that someone with a BRCA mutation would develop cancer in his or her lifetime1,2,3,4.       Lifetime Cancer Risks    General Population BRCA1  BRCA2   Breast  12% >60% >60%   Ovarian  1-2% 39-58% 13-29%   Prostate 12% 7-26% 19-61%   Male Breast 0.1% 0.2-1.2% 1.8-7.1%   Pancreas 1-2% Up to 5% 5-10%     A person s ethnic background is also important to consider, as individuals of Ashkenazi Anabaptist ancestry have a higher chance of having a BRCA gene mutation.  There are three BRCA mutations that occur more frequently in this population.      Larsen Syndrome (MLH1, MSH2, MSH6, PMS2, and EPCAM)  Currently five genes are known to cause Larsen Syndrome: MLH1, MSH2, MSH6, PMS2, and EPCAM.  A single mutation in one of the Larsen Syndrome genes increases the risk for colon, endometrial, ovarian, and stomach cancers.  Other cancers that occur less commonly in Larsen Syndrome include urinary tract, skin, and brain cancers.  The chart below shows the chance that a person with Larsen syndrome would develop cancer in his or her lifetime5.      Lifetime Cancer Risks    General Population Larsen Syndrome   Colon 5% 10-61%   Endometrial 3% 13-57%   Ovarian 1-2% 1-38%   Stomach <1% 1-9%   *Cancer risk varies depending on Larsen syndrome gene found      Cowden Syndrome (PTEN)  Cowden syndrome is a hereditary condition that increases the risk for breast, thyroid, endometrial, colon, and kidney cancer.  Cowden syndrome is caused by a mutation in the PTEN gene.  A single mutation in one of the copies of PTEN causes Cowden syndrome and increases cancer risk.  The chart below shows the chance that someone with a PTEN mutation would develop cancer in their lifetime6,7.  Other benign features seen in some individuals with Cowden syndrome include benign  skin lesions (facial papules, keratoses, lipomas), learning disability, autism, thyroid nodules, colon polyps, and larger head size.     Lifetime Cancer Risks    General Population Cowden   Breast 12% 40-60%*   Thyroid 1% Up to 38%   Renal 1-2% Up to 35%   Endometrial 3% Up to 28%   Colon 5% Up to 9%   Melanoma 2-3% Up to 6%   *Emerging data suggests the risk for breast cancer could be greater than 60%               Li-Fraumeni Syndrome (TP53)  Li-Fraumeni Syndrome (LFS) is a cancer predisposition syndrome caused by a mutation in the TP53 gene. A single mutation in one of the copies of TP53 increases the risk for multiple cancers. Individuals with LFS are at an increased risk for developing cancer at a young age. The lifetime risk for development of a LFS-associated cancer is 50% by age 30 and 90% by age 60.   Core Cancers: Sarcomas, Breast, Brain, Lung, Leukemias/Lymphomas, Adrenocortical carcinomas  Other Cancers: Gastrointestinal, Thyroid, Skin, Genitourinary       Hereditary Diffuse Gastric Cancer (CDH1)  Currently, one gene is known to cause hereditary diffuse gastric cancer (HDGC): CDH1.  Individuals with HDGC are at increased risk for diffuse gastric cancer and lobular breast cancer. Of people diagnosed with HDGC, 30-50% have a mutation in the CDH1 gene.  This suggests there are likely other genes that may cause HDGC that have not been identified yet.      Lifetime Cancer Risks    General Population HDGC   Diffuse Gastric  <1% ~80%   Breast 12% 41-60%       Additional Genes    ALBERT  ALBERT is a moderate-risk breast cancer gene. Women who have a mutation in ALBERT can have between a 2-4 fold increased risk for breast cancer compared to the general population8. ALBERT mutations have also been associated with increased risk for pancreatic cancer between 5-10%9. Individuals who inherit two ALBERT mutations have a condition called ataxia-telangiectasia (AT).  This rare autosomal recessive condition affects the nervous system  and immune system, and is associated with progressive cerebellar ataxia beginning in childhood. Individuals with ataxia-telangiectasia often have a weakened immune system and have an increased risk for childhood cancers.    PALB2  Mutations in PALB2 have been shown to increase the risk of breast cancer up to 41-60% in some families; where individuals fall within this risk range is dependent upon family [id number]. PALB2 mutations have also been associated with increased risk for pancreatic cancer between 5-10%.  Individuals who inherit two PALB2 mutations--one from their mother and one from their father--have a condition called Fanconi Anemia.  This rare autosomal recessive condition is associated with short stature, developmental delay, bone marrow failure, and increased risk for childhood cancers.    CHEK2   CHEK2 is a moderate-risk breast cancer gene.  Women who have a mutation in CHEK2 have around a 2-4 fold increased risk for breast cancer compared to the general population, and this risk may be higher depending upon family history.11,12,13 The risk of colon cancer may be twice as high as the general population risk of colon cancer of 5%. Mutations in CHEK2 have also been shown to increase the risk of other cancers, including prostate, however these cancer risks are currently not well understood.    BRIP1, RAD51C and RAD51D  Mutations in RAD51C and RAD51D have been shown to increase the risk of ovarian cancer and breast cancer 14,. Mutations in BRIP1 have been shown to increase the risk of ovarian cancer and possibly female breast cancer 15 .       Lifetime Cancer Risk    General Population        BRIP1   RAD51C  RAD51D   Breast 12% Not well defined 20-40% 20-40%   Ovarian 1-2% 5-15% 10-15% 10-20%     ______________________________________________________________  Inheritance  All of the cancer syndromes reviewed above are inherited in an autosomal dominant pattern.  This means that if a parent has a mutation,  each of their children will have a 50% chance of inheriting that same mutation. Therefore, each child --male or female-- would have a 50% chance of being at increased risk for developing cancer.    Image obtained from Genetics Home Reference, 2013     Mutations in some genes can occur de jessica, which means that a person s mutation occurred for the first time in them and was not inherited from a parent.  Now that they have the mutation, however, it can be passed on to future generations.    Genetic Testing  Genetic testing involves a blood test and will look for any harmful mutations that are associated with increased cancer risk.  If possible, it is recommended that the person(s) who has had cancer be tested before other family members.  That person will give us the most useful information about whether or not a specific gene is associated with the cancer in the family.    Results  There are three possible results of genetic testing:  Positive--a harmful mutation was identified in one or more of the genes  Negative--no mutations were identified in any of the genes tested  Variant of unknown significance--a variation in one of the genes was identified, but it is unclear how this impacts cancer risk in the family    Advantages and Disadvantages   There are advantages and disadvantages to genetic testing.    Advantages  May clarify your cancer risk  Can help you make medical decisions  May explain the cancers in your family  May give useful information to your family members (if you share your results)    Disadvantages  Possible negative emotional impact of learning about inherited cancer risk  Uncertainty in interpreting a negative test result in some situations  Possible genetic discrimination concerns (see below)    Genetic Information Nondiscrimination Act (MIRNA)  The Genetic Information Nondiscrimination Act of 2008 (MIRNA) is a federal law that protects individuals from health insurance or employment discrimination  based on a genetic test result alone (with some exceptions, including employers with fewer than 15 employees, and ).  Although rare, MIRNA  does not cover discrimination protections in terms of life insurance, long term care, or disability insurances.  Visit the National Human [employer] [place] website to learn more.    Reducing Cancer Risk  All of the genes described in this handout have nationally recognized cancer screening guidelines that would be recommended for individuals who test positive.  In addition to increased cancer screening, surgeries may be offered or recommended to reduce cancer risk.  Recommendations are based upon an individual s genetic test result as well as their personal and family history of cancer.    Questions to Think About Regarding Genetic Testing:  What effect will the test result have on me and my relationship with my family members if I have an inherited gene mutation?  If I don t have a gene mutation?  Should I share my test results, and how will my family react to this news, which may also affect them?  Are my children ready to learn new information that may one day affect their own health?    Hereditary Cancer Resources    FORCE: Facing Our Risk of Cancer Empowered facingourrisk.org   Bright Pink bebrightpink.org   Li-Fraumeni Syndrome Association lfsassociation.org   PTEN World PTENworld.com   No stomach for cancer, Inc. nostomachforcancer.org   Stomach cancer relief network Scrnet.org   Collaborative Group of the Americas on Inherited Colorectal Cancer (CGA) cgaicc.com    Cancer Care cancercare.org   American Cancer Society (ACS) cancer.org   National Cancer [place] (NCI) cancer.gov     Please call us if you have any questions or concerns.   Cancer Risk Management Program [phone] ([phone])  Marvin Law, MS [facility]  [phone]  Sarah Harper, MS, [facility] [phone]  Radha Ureña, MS, [facility]  [phone]  Edda Robison, MS, [facility]  [phone]  Earlene Rangel,  MS, [facility]  [phone]  Eileen Mcgregor, MS, [facility] [phone]  Tami Castellano, MS, [facility] [phone]    References  Juan Blount PDP, Lani S, Sherron HENNING, Pete JE, Jerad JL, Saskia N, Britton H, Gurmeet O, Laura A, Pasini B, Radimarcela P, Manjemma S, Ana DM, Bates N, Taco E, Mee H, Armendariz E, Rosaura J, Gronnely J, Garrett B, Tulinius H, Thorlacius S, Eerola H, Nevanlinna H, Antonio K, Noelle OP. Average risks of breast and ovarian cancer associated with BRCA1 or BRCA2 mutations detected in case series unselected for family history: a combined analysis of 222 studies. Am J Hum Laura. 2003;72:1117-30.  Giuliano N, eRbecca M, Sanjuanita G.  BRCA1 and BRCA2 Hereditary Breast and Ovarian Cancer. Gene Reviews online. 2013.  Puma YC, Linda S, Adam G, Mancilla S. Breast cancer risk among male BRCA1 and BRCA2 mutation carriers. J Natl Cancer Inst. 2007;99:1811-4.  Joey BERNAL, Bunny I, Gene J, Page E, Lola ER, Misty F. Risk of breast cancer in male BRCA2 carriers. J Med Laura. 2010;47:710-1.  National Comprehensive Cancer Network. Clinical practice guidelines in oncology, colorectal cancer screening. Available online (registration required). 2015.  Atilio MH, Roderick J, Gato J, Gurjit PEÑA, Dusty MS, Eng C. Lifetime cancer risks in individuals with germline PTEN mutations. Clin Cancer Res. 2012;18:400-7.  Paco R. Cowden Syndrome: A Critical Review of the Clinical Literature. J Laura . 2009:18:13-27.  Brady HULL, Obi ARAGON, Darlyn S, Roopa P, Claudia T, Coleen M, Brett B, Marco H, Wisam R, Tessa K, Cher L, Joey BERNAL, Ana ARAGON, Juan Manuel DF, Vicenta MR, The Breast Cancer Susceptibility Collaboration (UK) & Cortes YEAGER. ALBERT mutations that cause ataxia-telangiectasia are breast cancer susceptibility alleles. Nature Genetics. 2006;38:873-875  Francis N , Karo Y, Jennifer J, Rylee L, Mary BROWNLEE , Raphael ML, Adonis S, Cristina AG, Nolan S, Solomon ML, Shalonda J , Mathew R, Rupa MANCILLA, John  JR, Hien VE, Paramjit M, Vobronwynstein B, Guerrero N, Max RH, Zaid KW, and Sterling AP. ALBERT mutations in patients with hereditary pancreatic cancer. Cancer Discover. 2012;2:41-46  Thony RODAS., et al. Breast-Cancer Risk in Families with Mutations in PALB2. NEJM. 2014; 371(6):497-506.  CHEK2 Breast Cancer Case-Control Consortium. CHEK2*1100delC and susceptibility to breast cancer: A collaborative analysis involving 10,860 breast cancer cases and 9,065 controls from 10 studies. Am J Hum Laura, 74 (2004), pp. 3485-2171  Arjun T, Raymundo S, Nuria K, et al. Spectrum of Mutations in BRCA1, BRCA2, CHEK2, and TP53 in Families at High Risk of Breast Cancer. JESSICA. 2006;295(12):2604-6800.   Susie C, Mikael D, Zabrina HULL, et al. Risk of breast cancer in women with a CHEK2 mutation with and without a family history of breast cancer. J Clin Oncol. 2011;29:9093-2880.  Song H, Boras E, Ramus SJ, et al. Contribution of germline mutations in the RAD51B, RAD51C, and RAD51D genes to ovarian cancer in the population. J Clin Oncol. 2015;33(26):0307-7009. Doi:10.1200/JCO.2015.61.2408.  Keena T, Ousmane DF, Cesilia P, et al. Mutations in BRIP1 confer high risk of ovarian cancer. Aimee Laura. 2011;43(11):2722-5356. doi:10.1038/ng.955.

## 2023-09-11 ENCOUNTER — MYC MEDICAL ADVICE (OUTPATIENT)
Dept: FAMILY MEDICINE | Facility: CLINIC | Age: 38
End: 2023-09-11
Payer: COMMERCIAL

## 2023-09-11 DIAGNOSIS — E06.3 HASHIMOTO'S THYROIDITIS: Primary | ICD-10-CM

## 2023-09-12 ENCOUNTER — LAB (OUTPATIENT)
Dept: LAB | Facility: CLINIC | Age: 38
End: 2023-09-12
Payer: COMMERCIAL

## 2023-09-12 DIAGNOSIS — E06.3 HASHIMOTO'S THYROIDITIS: ICD-10-CM

## 2023-09-12 PROCEDURE — 84443 ASSAY THYROID STIM HORMONE: CPT

## 2023-09-12 PROCEDURE — 36415 COLL VENOUS BLD VENIPUNCTURE: CPT

## 2023-09-12 PROCEDURE — 84439 ASSAY OF FREE THYROXINE: CPT

## 2023-09-12 NOTE — TELEPHONE ENCOUNTER
SN/PN ([facility]),  Please see below [employer] message and advise.  Lab appointment at 11:45 this morning.  Pended TSH  Thanks,  Marielos GERMAIN RN

## 2023-09-13 LAB
T4 FREE SERPL-MCNC: 0.74 NG/DL (ref 0.9–1.7)
TSH SERPL DL<=0.005 MIU/L-ACNC: 14.9 UIU/ML (ref 0.3–4.2)

## 2023-09-13 RX ORDER — LEVOTHYROXINE SODIUM 150 UG/1
150 TABLET ORAL DAILY
Qty: 90 TABLET | Refills: 1 | Status: SHIPPED | OUTPATIENT
Start: 2023-09-13 | End: 2024-03-22

## 2023-10-03 DIAGNOSIS — Z80.3 FAMILY HISTORY OF MALIGNANT NEOPLASM OF BREAST: Primary | ICD-10-CM

## 2023-10-26 ENCOUNTER — VIRTUAL VISIT (OUTPATIENT)
Dept: ONCOLOGY | Facility: CLINIC | Age: 38
End: 2023-10-26
Attending: GENETIC COUNSELOR, MS
Payer: COMMERCIAL

## 2023-10-26 DIAGNOSIS — Z80.3 FAMILY HISTORY OF MALIGNANT NEOPLASM OF BREAST: Primary | ICD-10-CM

## 2023-10-26 PROCEDURE — 999N000069 HC STATISTIC GENETIC COUNSELING, < 16 MIN: Mod: TEL,95 | Performed by: GENETIC COUNSELOR, MS

## 2023-10-26 NOTE — NURSING NOTE
Is the patient currently in the state of [place]? YES    Visit mode:TELEPHONE    If the visit is dropped, the patient can be reconnected by: TELEPHONE VISIT: Phone number:   Telephone Information:   Mobile [phone]       Will anyone else be joining the visit? NO  (If patient encounters technical issues they should call [phone]    How would you like to obtain your AVS? MyChart    Are changes needed to the allergy or medication list? N/A    Reason for visit: RECHECK    Kate GORDILLO

## 2023-10-26 NOTE — LETTER
"October 26, 2023    Awilda Campos  [address] [facility] [address] [address]      Dear Awilda,    It was a pleasure speaking with you on the phone on 10/26/2023. Here is a copy of the progress note from our discussion. If you have any additional questions, please feel free to call.    Referring Provider: Zhane Mckenzie MD    Presenting Information:  I spoke to Awilda by telephone today to discuss her genetic testing results. A saliva kit was sent to her house on 8/18/23. The Common Hereditary Cancers panel was ordered from [employer]. This testing was done because of Awilda's family history of breast cancer.    Genetic Testing Result: NEGATIVE  Awilda is negative for mutations in APC, ALBERT, AXIN2, BARD1, BMPR1A, BRCA1, BRCA2, BRIP1, CDH1, CDK4, CDKN2A, CHEK2, CTNNA1, DICER1, EPCAM, GREM1, HOXB13, KIT, MEN1, MLH1, MSH2, MSH3, MSH6, MUTYH, NBN, NF1, NTHL1, PALB2, PDGFRA, PMS2, POLD1, POLE, PTEN, RAD50, RAD51C, RAD51D, SDHA, SDHB, SDHC, SDHD, SMAD4, SMARCA4, STK11, TP53, TSC1, TSC2, and VHL. No mutations were found in any of the 47 genes analyzed. This test involved sequencing and deletion/duplication analysis of all genes with the exceptions of EPCAM and GREM1 (deletions/duplications only) and SDHA (sequencing only).     A copy of the test report can be found in the Laboratory tab, dated 9/18/23, and named \"LABORATORY MISCELLANEOUS ORDER\". The report is scanned in as a linked document.    Interpretation:  We discussed several different interpretations of this negative test result.    One explanation may be that there is a different gene or combination of genes and environment that are associated with the cancers in this family.  It is possible that her mother did have a mutation in one of these genes and she did not inherit it.  There is also a small possibility that there is a mutation in one of these genes, and the testing laboratory could not find it with their current testing methods.   "         Screening:  Based on this negative test result, it is important for Awilda and her relatives to refer back to the family history for appropriate cancer screening.    Based on her personal and family history, Awilda has a 15.4% lifetime risk of developing breast cancer based on the CLARISA model. Therefore, Awilda does not meet current National Comprehensive Cancer Network (NCCN) guidelines for high risk breast screening, which is offered to women with a 20% lifetime risk or higher. However, it is still important for Awilda to continue with routine breast screening under the care of her physicians. Breast cancer screening is generally recommended to begin approximately 10 years younger than the earliest age of breast cancer diagnosis in the family, or at age 40, whichever comes first. In this family, screening may begin at age 30. Awilda is encouraged to discuss breast screening with her physicians.   Other population cancer screening options, such as those recommended by the American Cancer Society and the National Comprehensive Cancer Network (NCCN), are also appropriate for Awilda and her family. These screening recommendations may change if there are changes to Awilda's personal and/or family history of cancer. Final screening recommendations should be made by each individual's primary care provider.      Inheritance:  We reviewed autosomal dominant inheritance. We discussed that Awilda did not pass on an identifiable mutation in these genes to her children based on this test result. Mutations in these genes do not skip generations.      Additional Testing Considerations:  Although Awilda's genetic testing result was negative, other relatives may still carry a gene mutation associated with breast cancer. Genetic counseling is recommended for her mother to discuss genetic testing options. If she, or any other relatives do pursue genetic testing, Awilda is encouraged to contact me so that we may  review the impact of their test results on her.    Summary:  We do not have an explanation for Awilda's family history of cancer. While no genetic changes were identified, Awilda may still be at risk for certain cancers due to family history, environmental factors, or other genetic causes not identified by this test. Because of that, it is important that she continue with cancer screening based on her personal and family history as discussed above.    Genetic testing is rapidly advancing, and new cancer susceptibility genes will most likely be identified in the future. Therefore, I encouraged Awilda to contact me annually or if there are changes in her personal or family history. This may change how we assess her cancer risk, screening, and the testing we would offer.    Plan:  1.  A copy of the test results will be sent to Awilda.  2. She plans to follow-up with her other providers.  3. She should contact me regularly, or sooner if her family history changes.    If Awilda has any further questions, I encouraged her to contact me via [employer].    Time spent on the phone: 5 minutes.    Marvin Law MS, [facility] [place]  Licensed, Certified Genetic Counselor

## 2023-10-26 NOTE — PROGRESS NOTES
"10/26/2023    Referring Provider: Zhane Mckenzie MD    Presenting Information:  I spoke to Awilda by telephone today to discuss her genetic testing results. A saliva kit was sent to her house on 8/18/23. The Common Hereditary Cancers panel was ordered from [employer]. This testing was done because of Awilda's family history of breast cancer.    Genetic Testing Result: NEGATIVE  Awilda is negative for mutations in APC, ALBERT, AXIN2, BARD1, BMPR1A, BRCA1, BRCA2, BRIP1, CDH1, CDK4, CDKN2A, CHEK2, CTNNA1, DICER1, EPCAM, GREM1, HOXB13, KIT, MEN1, MLH1, MSH2, MSH3, MSH6, MUTYH, NBN, NF1, NTHL1, PALB2, PDGFRA, PMS2, POLD1, POLE, PTEN, RAD50, RAD51C, RAD51D, SDHA, SDHB, SDHC, SDHD, SMAD4, SMARCA4, STK11, TP53, TSC1, TSC2, and VHL. No mutations were found in any of the 47 genes analyzed. This test involved sequencing and deletion/duplication analysis of all genes with the exceptions of EPCAM and GREM1 (deletions/duplications only) and SDHA (sequencing only).     A copy of the test report can be found in the Laboratory tab, dated 9/18/23, and named \"LABORATORY MISCELLANEOUS ORDER\". The report is scanned in as a linked document.    Interpretation:  We discussed several different interpretations of this negative test result.    One explanation may be that there is a different gene or combination of genes and environment that are associated with the cancers in this family.  It is possible that her mother did have a mutation in one of these genes and she did not inherit it.  There is also a small possibility that there is a mutation in one of these genes, and the testing laboratory could not find it with their current testing methods.       Screening:  Based on this negative test result, it is important for Awilda and her relatives to refer back to the family history for appropriate cancer screening.    Based on her personal and family history, Awilda has a 15.4% lifetime risk of developing breast cancer based on the CLARISA " model. Therefore, Awilda does not meet current National Comprehensive Cancer Network (NCCN) guidelines for high risk breast screening, which is offered to women with a 20% lifetime risk or higher. However, it is still important for Awilda to continue with routine breast screening under the care of her physicians. Breast cancer screening is generally recommended to begin approximately 10 years younger than the earliest age of breast cancer diagnosis in the family, or at age 40, whichever comes first. In this family, screening may begin at age 30. Awilda is encouraged to discuss breast screening with her physicians.   Other population cancer screening options, such as those recommended by the American Cancer Society and the National Comprehensive Cancer Network (NCCN), are also appropriate for Awilda and her family. These screening recommendations may change if there are changes to Awilda's personal and/or family history of cancer. Final screening recommendations should be made by each individual's primary care provider.      Inheritance:  We reviewed autosomal dominant inheritance. We discussed that Awilda did not pass on an identifiable mutation in these genes to her children based on this test result. Mutations in these genes do not skip generations.      Additional Testing Considerations:  Although Awilda's genetic testing result was negative, other relatives may still carry a gene mutation associated with breast cancer. Genetic counseling is recommended for her mother to discuss genetic testing options. If she, or any other relatives do pursue genetic testing, Awilda is encouraged to contact me so that we may review the impact of their test results on her.    Summary:  We do not have an explanation for Awilda's family history of cancer. While no genetic changes were identified, Awilda may still be at risk for certain cancers due to family history, environmental factors, or other genetic causes not  identified by this test. Because of that, it is important that she continue with cancer screening based on her personal and family history as discussed above.    Genetic testing is rapidly advancing, and new cancer susceptibility genes will most likely be identified in the future. Therefore, I encouraged Awilda to contact me annually or if there are changes in her personal or family history. This may change how we assess her cancer risk, screening, and the testing we would offer.    Plan:  1.  A copy of the test results will be sent to Awilda.  2. She plans to follow-up with her other providers.  3. She should contact me regularly, or sooner if her family history changes.    If Awilda has any further questions, I encouraged her to contact me via [employer].    Time spent on the phone: 5 minutes.    Marvin Law MS, [facility] [place]  Licensed, Certified Genetic Counselor      Virtual Visit Details    Type of service:  Telephone Visit

## 2023-10-26 NOTE — Clinical Note
"    10/26/2023         RE: Awilda Campos  [address] [address]        Dear Colleague,    Thank you for referring your patient, Awilda Campos, to the [facility]. Please see a copy of my visit note below.    10/26/2023    Referring Provider: Zhane Mckenzie MD    Presenting Information:  I spoke to Awilda by telephone today to discuss her genetic testing results. A saliva kit was sent to her house on 8/18/23. The Common Hereditary Cancers panel was ordered from [employer]. This testing was done because of Awilda's family history of breast cancer.    Genetic Testing Result: NEGATIVE  Awilda is negative for mutations in APC, ALBERT, AXIN2, BARD1, BMPR1A, BRCA1, BRCA2, BRIP1, CDH1, CDK4, CDKN2A, CHEK2, CTNNA1, DICER1, EPCAM, GREM1, HOXB13, KIT, MEN1, MLH1, MSH2, MSH3, MSH6, MUTYH, NBN, NF1, NTHL1, PALB2, PDGFRA, PMS2, POLD1, POLE, PTEN, RAD50, RAD51C, RAD51D, SDHA, SDHB, SDHC, SDHD, SMAD4, SMARCA4, STK11, TP53, TSC1, TSC2, and VHL. No mutations were found in any of the 47 genes analyzed. This test involved sequencing and deletion/duplication analysis of all genes with the exceptions of EPCAM and GREM1 (deletions/duplications only) and SDHA (sequencing only).     A copy of the test report can be found in the Laboratory tab, dated 9/18/23, and named \"LABORATORY MISCELLANEOUS ORDER\". The report is scanned in as a linked document.    Interpretation:  We discussed several different interpretations of this negative test result.    One explanation may be that there is a different gene or combination of genes and environment that are associated with the cancers in this family.  It is possible that her mother did have a mutation in one of these genes and she did not inherit it.  There is also a small possibility that there is a mutation in one of these genes, and the testing laboratory could not find it with their current testing methods.       Screening:  Based on this " negative test result, it is important for Awilda and her relatives to refer back to the family history for appropriate cancer screening.    Based on her personal and family history, Awilda has a 15.4% lifetime risk of developing breast cancer based on the CLARISA model. Therefore, Awilda does not meet current National Comprehensive Cancer Network (NCCN) guidelines for high risk breast screening, which is offered to women with a 20% lifetime risk or higher. However, it is still important for Awilda to continue with routine breast screening under the care of her physicians. Breast cancer screening is generally recommended to begin approximately 10 years younger than the earliest age of breast cancer diagnosis in the family, or at age 40, whichever comes first. In this family, screening may begin at age 30. Awilda is encouraged to discuss breast screening with her physicians.   Other population cancer screening options, such as those recommended by the American Cancer Society and the National Comprehensive Cancer Network (NCCN), are also appropriate for Awilda and her family. These screening recommendations may change if there are changes to Awilda's personal and/or family history of cancer. Final screening recommendations should be made by each individual's primary care provider.      Inheritance:  We reviewed autosomal dominant inheritance. We discussed that Awilda did not pass on an identifiable mutation in these genes to her children based on this test result. Mutations in these genes do not skip generations.      Additional Testing Considerations:  Although Awilda's genetic testing result was negative, other relatives may still carry a gene mutation associated with breast cancer. Genetic counseling is recommended for her mother to discuss genetic testing options. If she, or any other relatives do pursue genetic testing, Awilda is encouraged to contact me so that we may review the impact of their test  results on her.    Summary:  We do not have an explanation for Awilda's family history of cancer. While no genetic changes were identified, Awilda may still be at risk for certain cancers due to family history, environmental factors, or other genetic causes not identified by this test. Because of that, it is important that she continue with cancer screening based on her personal and family history as discussed above.    Genetic testing is rapidly advancing, and new cancer susceptibility genes will most likely be identified in the future. Therefore, I encouraged Awilda to contact me annually or if there are changes in her personal or family history. This may change how we assess her cancer risk, screening, and the testing we would offer.    Plan:  1.  A copy of the test results will be sent to Awilda.  2. She plans to follow-up with her other providers.  3. She should contact me regularly, or sooner if her family history changes.    If Awilda has any further questions, I encouraged her to contact me via [employer].    Time spent on the phone: 5 minutes.    Marvin Law MS, [facility] [place]  Licensed, Certified Genetic Counselor      Virtual Visit Details    Type of service:  Telephone Visit       Again, thank you for allowing me to participate in the care of your patient.        Sincerely,        Marvin Law GC

## 2023-12-05 ENCOUNTER — VIRTUAL VISIT (OUTPATIENT)
Dept: FAMILY MEDICINE | Facility: CLINIC | Age: 38
End: 2023-12-05
Payer: COMMERCIAL

## 2023-12-05 DIAGNOSIS — R05.3 CHRONIC COUGH: Primary | ICD-10-CM

## 2023-12-05 PROCEDURE — 99213 OFFICE O/P EST LOW 20 MIN: CPT | Mod: VID | Performed by: PHYSICIAN ASSISTANT

## 2023-12-05 NOTE — PROGRESS NOTES
"Fiona is a 38 year old who is being evaluated via a billable video visit.      How would you like to obtain your AVS? MyChart  If the video visit is dropped, the invitation should be resent by: Text to cell phone: [phone]  Will anyone else be joining your video visit? No          Assessment & Plan     Chronic cough  Discussed several different causes of ongoing cough, difficult to fully say via virtual visit.  I do think further evaluation makes sense, and orders have been placed.  Will also have her start OTC prilosec to see if reflux is playing a role in this.  - XR Chest 2 Views; Future  - CBC with platelets and differential; Future  - Comprehensive metabolic panel (BMP + Alb, Alk Phos, ALT, AST, Total. Bili, TP); Future             BMI:   Estimated body mass index is 30.54 kg/m  as calculated from the following:    Height as of 4/6/23: 1.671 m (5' 5.79\").    Weight as of 7/7/23: 85.3 kg (188 lb).           Kwaku Neff PA-C  [facility]    Subjective   Fiona is a 38 year old, presenting for the following health issues:  Cough         No data to display                HPI       COUGH : Symptom Review  How many days ago did these symptoms start? Symptoms started in mid October. Symptoms went down for a while, then returned. Pt tested for COVID in October (negative).     Are any of the following symptoms significant for you?  New or worsening difficulty breathing? Yes  Please describe what kind of difficulty you are having breathing:Mild dyspnea (able to do ADLs without difficulty, mild shortness of breath with activities such as climbing one or two flights of stairs or walking briskly)  Worsening cough? Yes, I am coughing up mucus.  Fever or chills? No  Headache: No  Sore throat: No  Chest pain: No  Diarrhea: No  Body aches? No    What treatments has patient tried? Rescue inhaler with dyspnea; mucinex/cough suppressant   Does patient live in a nursing home, group home, or shelter? " No  Does patient have a way to get food/medications during quarantined? Yes, I have a friend or family member who can help me.          Been dealing with non productive cough for the last 2 months.  She has never felt sick during this time.  No fevers, chills.  Seems to get worse when she lies down.  Does not feel SOB.  Has tried cough suppressant, unsure if helps.  Had a bit of a wheeze at one point, and albuterol helped.  Does not feel like her asthma.        Review of Systems   Constitutional, HEENT, cardiovascular, pulmonary, gi and gu systems are negative, except as otherwise noted.      Objective           Vitals:  No vitals were obtained today due to virtual visit.    Physical Exam   GENERAL: Healthy, alert and no distress  EYES: Eyes grossly normal to inspection.  No discharge or erythema, or obvious scleral/conjunctival abnormalities.  RESP: No audible wheeze, cough, or visible cyanosis.  No visible retractions or increased work of breathing.    SKIN: Visible skin clear. No significant rash, abnormal pigmentation or lesions.  NEURO: Cranial nerves grossly intact.  Mentation and speech appropriate for age.  PSYCH: Mentation appears normal, affect normal/bright, judgement and insight intact, normal speech and appearance well-groomed.                Video-Visit Details    Type of service:  Video Visit     Originating Location (pt. Location): Home    Distant Location (provider location):  On-site  Platform used for Video Visit: [employer]in[employer]

## 2023-12-12 ENCOUNTER — ANCILLARY PROCEDURE (OUTPATIENT)
Dept: GENERAL RADIOLOGY | Facility: CLINIC | Age: 38
End: 2023-12-12
Attending: PHYSICIAN ASSISTANT
Payer: COMMERCIAL

## 2023-12-12 DIAGNOSIS — R05.3 CHRONIC COUGH: ICD-10-CM

## 2023-12-12 PROCEDURE — 71046 X-RAY EXAM CHEST 2 VIEWS: CPT | Mod: TC | Performed by: RADIOLOGY

## 2024-01-22 ENCOUNTER — E-VISIT (OUTPATIENT)
Dept: FAMILY MEDICINE | Facility: CLINIC | Age: 39
End: 2024-01-22
Payer: COMMERCIAL

## 2024-01-22 DIAGNOSIS — R21 RASH: Primary | ICD-10-CM

## 2024-01-22 PROCEDURE — 99207 PR NON-BILLABLE SERV PER CHARTING: CPT | Performed by: PHYSICIAN ASSISTANT

## 2024-01-23 NOTE — PATIENT INSTRUCTIONS
Dear Awilda Campos,    We are sorry you are not feeling well. Based on the responses you provided, it is recommended that you be seen in-person in urgent care so we can better evaluate your symptoms. Please click here to find the nearest urgent care location to you.   You will not be charged for this Visit. Thank you for trusting us with your care.    Mehdi Petty PA-C

## 2024-03-07 ENCOUNTER — PATIENT OUTREACH (OUTPATIENT)
Dept: CARE COORDINATION | Facility: CLINIC | Age: 39
End: 2024-03-07
Payer: COMMERCIAL

## 2024-03-21 ENCOUNTER — MYC MEDICAL ADVICE (OUTPATIENT)
Dept: FAMILY MEDICINE | Facility: CLINIC | Age: 39
End: 2024-03-21
Payer: COMMERCIAL

## 2024-03-21 ENCOUNTER — PATIENT OUTREACH (OUTPATIENT)
Dept: CARE COORDINATION | Facility: CLINIC | Age: 39
End: 2024-03-21
Payer: COMMERCIAL

## 2024-03-21 ENCOUNTER — MYC REFILL (OUTPATIENT)
Dept: FAMILY MEDICINE | Facility: CLINIC | Age: 39
End: 2024-03-21
Payer: COMMERCIAL

## 2024-03-21 DIAGNOSIS — F90.2 ADHD (ATTENTION DEFICIT HYPERACTIVITY DISORDER), COMBINED TYPE: ICD-10-CM

## 2024-03-21 DIAGNOSIS — E06.3 HASHIMOTO'S THYROIDITIS: ICD-10-CM

## 2024-03-21 DIAGNOSIS — E06.3 HASHIMOTO'S THYROIDITIS: Primary | ICD-10-CM

## 2024-03-21 RX ORDER — LEVOTHYROXINE SODIUM 150 UG/1
150 TABLET ORAL DAILY
Qty: 90 TABLET | Refills: 1 | OUTPATIENT
Start: 2024-03-21

## 2024-03-21 RX ORDER — DEXTROAMPHETAMINE SACCHARATE, AMPHETAMINE ASPARTATE MONOHYDRATE, DEXTROAMPHETAMINE SULFATE AND AMPHETAMINE SULFATE 2.5; 2.5; 2.5; 2.5 MG/1; MG/1; MG/1; MG/1
10 CAPSULE, EXTENDED RELEASE ORAL DAILY
Qty: 30 CAPSULE | Refills: 0 | Status: CANCELLED | OUTPATIENT
Start: 2024-03-21

## 2024-04-08 ENCOUNTER — LAB (OUTPATIENT)
Dept: LAB | Facility: CLINIC | Age: 39
End: 2024-04-08
Payer: COMMERCIAL

## 2024-04-08 DIAGNOSIS — E06.3 HASHIMOTO'S THYROIDITIS: ICD-10-CM

## 2024-04-08 DIAGNOSIS — F90.2 ADHD (ATTENTION DEFICIT HYPERACTIVITY DISORDER), COMBINED TYPE: Primary | ICD-10-CM

## 2024-04-08 PROCEDURE — 84443 ASSAY THYROID STIM HORMONE: CPT

## 2024-04-08 PROCEDURE — 36415 COLL VENOUS BLD VENIPUNCTURE: CPT

## 2024-04-08 RX ORDER — DEXTROAMPHETAMINE SACCHARATE, AMPHETAMINE ASPARTATE MONOHYDRATE, DEXTROAMPHETAMINE SULFATE AND AMPHETAMINE SULFATE 2.5; 2.5; 2.5; 2.5 MG/1; MG/1; MG/1; MG/1
10 CAPSULE, EXTENDED RELEASE ORAL DAILY
Qty: 30 CAPSULE | Refills: 0 | Status: SHIPPED | OUTPATIENT
Start: 2024-06-09 | End: 2024-07-09

## 2024-04-08 RX ORDER — DEXTROAMPHETAMINE SACCHARATE, AMPHETAMINE ASPARTATE MONOHYDRATE, DEXTROAMPHETAMINE SULFATE AND AMPHETAMINE SULFATE 2.5; 2.5; 2.5; 2.5 MG/1; MG/1; MG/1; MG/1
10 CAPSULE, EXTENDED RELEASE ORAL DAILY
Qty: 30 CAPSULE | Refills: 0 | Status: CANCELLED | OUTPATIENT
Start: 2024-04-08

## 2024-04-08 NOTE — TELEPHONE ENCOUNTER
SN,  Patient also wondering about adderall refill.  Is out of medication.  Hoping to refill until appt.  Marielos CAVAZOS RN

## 2024-04-08 NOTE — TELEPHONE ENCOUNTER
SN,  Patient here for visit which was never scheduled.  Hoping she can get TSH labs today.  Please advise.  Marielos CAVAZOS RN

## 2024-04-09 LAB — TSH SERPL DL<=0.005 MIU/L-ACNC: 0.35 UIU/ML (ref 0.3–4.2)

## 2024-05-01 DIAGNOSIS — E06.3 HASHIMOTO'S THYROIDITIS: ICD-10-CM

## 2024-05-02 DIAGNOSIS — E06.3 HASHIMOTO'S THYROIDITIS: ICD-10-CM

## 2024-05-02 RX ORDER — LEVOTHYROXINE SODIUM 150 UG/1
150 TABLET ORAL DAILY
Qty: 30 TABLET | Refills: 0 | Status: SHIPPED | OUTPATIENT
Start: 2024-05-02 | End: 2024-05-06

## 2024-05-05 ASSESSMENT — ASTHMA QUESTIONNAIRES
QUESTION_2 LAST FOUR WEEKS HOW OFTEN HAVE YOU HAD SHORTNESS OF BREATH: NOT AT ALL
QUESTION_5 LAST FOUR WEEKS HOW WOULD YOU RATE YOUR ASTHMA CONTROL: COMPLETELY CONTROLLED
ACT_TOTALSCORE: 25
QUESTION_4 LAST FOUR WEEKS HOW OFTEN HAVE YOU USED YOUR RESCUE INHALER OR NEBULIZER MEDICATION (SUCH AS ALBUTEROL): NOT AT ALL
QUESTION_3 LAST FOUR WEEKS HOW OFTEN DID YOUR ASTHMA SYMPTOMS (WHEEZING, COUGHING, SHORTNESS OF BREATH, CHEST TIGHTNESS OR PAIN) WAKE YOU UP AT NIGHT OR EARLIER THAN USUAL IN THE MORNING: NOT AT ALL
ACT_TOTALSCORE: 25
QUESTION_1 LAST FOUR WEEKS HOW MUCH OF THE TIME DID YOUR ASTHMA KEEP YOU FROM GETTING AS MUCH DONE AT WORK, SCHOOL OR AT HOME: NONE OF THE TIME

## 2024-05-06 ENCOUNTER — OFFICE VISIT (OUTPATIENT)
Dept: FAMILY MEDICINE | Facility: CLINIC | Age: 39
End: 2024-05-06
Payer: COMMERCIAL

## 2024-05-06 VITALS
SYSTOLIC BLOOD PRESSURE: 110 MMHG | TEMPERATURE: 98.2 F | BODY MASS INDEX: 30.22 KG/M2 | HEART RATE: 89 BPM | DIASTOLIC BLOOD PRESSURE: 74 MMHG | WEIGHT: 186 LBS | OXYGEN SATURATION: 96 %

## 2024-05-06 DIAGNOSIS — Z00.00 WELL ADULT EXAM: ICD-10-CM

## 2024-05-06 DIAGNOSIS — Z12.4 CERVICAL CANCER SCREENING: Primary | ICD-10-CM

## 2024-05-06 DIAGNOSIS — E06.3 HASHIMOTO'S THYROIDITIS: ICD-10-CM

## 2024-05-06 DIAGNOSIS — L98.9 SKIN LESION: ICD-10-CM

## 2024-05-06 DIAGNOSIS — F90.2 ADHD (ATTENTION DEFICIT HYPERACTIVITY DISORDER), COMBINED TYPE: ICD-10-CM

## 2024-05-06 PROCEDURE — 87624 HPV HI-RISK TYP POOLED RSLT: CPT | Performed by: FAMILY MEDICINE

## 2024-05-06 PROCEDURE — 99395 PREV VISIT EST AGE 18-39: CPT | Performed by: FAMILY MEDICINE

## 2024-05-06 PROCEDURE — 99213 OFFICE O/P EST LOW 20 MIN: CPT | Mod: 25 | Performed by: FAMILY MEDICINE

## 2024-05-06 PROCEDURE — G0145 SCR C/V CYTO,THINLAYER,RESCR: HCPCS | Performed by: FAMILY MEDICINE

## 2024-05-06 RX ORDER — LEVOTHYROXINE SODIUM 150 UG/1
150 TABLET ORAL DAILY
Qty: 90 TABLET | Refills: 0 | Status: SHIPPED | OUTPATIENT
Start: 2024-05-06 | End: 2024-05-06

## 2024-05-06 RX ORDER — DEXTROAMPHETAMINE SACCHARATE, AMPHETAMINE ASPARTATE MONOHYDRATE, DEXTROAMPHETAMINE SULFATE AND AMPHETAMINE SULFATE 3.75; 3.75; 3.75; 3.75 MG/1; MG/1; MG/1; MG/1
15 CAPSULE, EXTENDED RELEASE ORAL DAILY
Qty: 30 CAPSULE | Refills: 0 | Status: SHIPPED | OUTPATIENT
Start: 2024-05-06 | End: 2024-06-05

## 2024-05-06 RX ORDER — DEXTROAMPHETAMINE SACCHARATE, AMPHETAMINE ASPARTATE MONOHYDRATE, DEXTROAMPHETAMINE SULFATE AND AMPHETAMINE SULFATE 3.75; 3.75; 3.75; 3.75 MG/1; MG/1; MG/1; MG/1
15 CAPSULE, EXTENDED RELEASE ORAL DAILY
Qty: 30 CAPSULE | Refills: 0 | Status: SHIPPED | OUTPATIENT
Start: 2024-06-06 | End: 2024-07-06

## 2024-05-06 RX ORDER — DEXTROAMPHETAMINE SACCHARATE, AMPHETAMINE ASPARTATE MONOHYDRATE, DEXTROAMPHETAMINE SULFATE AND AMPHETAMINE SULFATE 3.75; 3.75; 3.75; 3.75 MG/1; MG/1; MG/1; MG/1
15 CAPSULE, EXTENDED RELEASE ORAL DAILY
Qty: 30 CAPSULE | Refills: 0 | Status: SHIPPED | OUTPATIENT
Start: 2024-07-07 | End: 2024-08-06

## 2024-05-06 RX ORDER — LEVOTHYROXINE SODIUM 150 UG/1
150 TABLET ORAL DAILY
Qty: 90 TABLET | Refills: 3 | Status: SHIPPED | OUTPATIENT
Start: 2024-05-06

## 2024-05-06 SDOH — HEALTH STABILITY: PHYSICAL HEALTH: ON AVERAGE, HOW MANY DAYS PER WEEK DO YOU ENGAGE IN MODERATE TO STRENUOUS EXERCISE (LIKE A BRISK WALK)?: 2 DAYS

## 2024-05-06 SDOH — HEALTH STABILITY: PHYSICAL HEALTH: ON AVERAGE, HOW MANY MINUTES DO YOU ENGAGE IN EXERCISE AT THIS LEVEL?: 20 MIN

## 2024-05-06 ASSESSMENT — SOCIAL DETERMINANTS OF HEALTH (SDOH): HOW OFTEN DO YOU GET TOGETHER WITH FRIENDS OR RELATIVES?: ONCE A WEEK

## 2024-05-06 NOTE — PROGRESS NOTES
Preventive Care Visit  [facility]  Zhane Mckenzie MD, Family Medicine  May 6, 2024      Assessment & Plan     (Z12.4) Cervical cancer screening  (primary encounter diagnosis)  Comment:   Plan: Pap Screen with HPV - recommended age 30 - 65         years, HPV Hold (Lab Only), HPV High Risk Types        DNA Cervical            (Z00.00) Well adult exam  Comment:   Plan: Comprehensive metabolic panel (BMP + Alb, Alk         Phos, ALT, AST, Total. Bili, TP), Lipid panel         reflex to direct LDL Fasting            (E06.3) Hashimoto's thyroiditis  Comment:   Plan: levothyroxine (SYNTHROID/LEVOTHROID) 150 MCG         tablet            (F90.2) ADHD (attention deficit hyperactivity disorder), combined type  Comment:   Plan: amphetamine-dextroamphetamine (ADDERALL XR) 15         MG 24 hr capsule, amphetamine-dextroamphetamine        (ADDERALL XR) 15 MG 24 hr capsule,         amphetamine-dextroamphetamine (ADDERALL XR) 15         MG 24 hr capsule            (L98.9) Skin lesion  Comment:   Plan: Adult Dermatology  Referral            Patient has been advised of split billing requirements and indicates understanding: Yes        Counseling  Appropriate preventive services were discussed with this patient, including applicable screening as appropriate for fall prevention, nutrition, physical activity, Tobacco-use cessation, weight loss and cognition.  Checklist reviewing preventive services available has been given to the patient.          Angel Jaimes is a 39 year old, presenting for the following:  Physical        5/6/2024     3:16 PM   Additional Questions   Roomed by Deysi TALAMANTES MA   Accompanied by self         5/6/2024     3:16 PM   Patient Reported Additional Medications   Patient reports taking the following new medications none        Health Care Directive  Patient does not have a Health Care Directive or Living Will:     Healthy Habits:     Taking medications regularly:  2     Barriers to taking medications:  Remembering to take  History of Present Illness       Hypothyroidism:     Since last visit, patient describes the following symptoms::  None    She eats 2-3 servings of fruits and vegetables daily.She consumes 1 sweetened beverage(s) daily.She exercises with enough effort to increase her heart rate 10 to 19 minutes per day.  She exercises with enough effort to increase her heart rate 3 or less days per week. She is missing 2 dose(s) of medications per week.  She is not taking prescribed medications regularly due to remembering to take.      Adderall seems to be working would like refills no side effects noted except feels higher dose would work better wonders about trying 15mg XR dose              5/6/2024   General Health   How would you rate your overall physical health? Good   Feel stress (tense, anxious, or unable to sleep) Not at all         5/6/2024   Nutrition   Three or more servings of calcium each day? Yes   Diet: Regular (no restrictions)   How many servings of fruit and vegetables per day? (!) 2-3   How many sweetened beverages each day? 0-1         5/6/2024   Exercise   Days per week of moderate/strenous exercise 2 days   Average minutes spent exercising at this level 20 min   (!) EXERCISE CONCERN      5/6/2024   Social Factors   Frequency of gathering with friends or relatives Once a week   Worry food won't last until get money to buy more No   Food not last or not have enough money for food? No   Do you have housing?  No   Are you worried about losing your housing? No   Lack of transportation? No   Unable to get utilities (heat,electricity)? No   Want help with housing or utility concern? No   (!) HOUSING CONCERN PRESENT      5/6/2024   Dental   Dentist two times every year? Yes         5/6/2024   TB Screening   Were you born outside of the [place]? No         Today's PHQ-2 Score:       5/5/2024     5:09 PM   PHQ-2 ( 1999 Pfizer)   Q1: Little interest or pleasure in doing  things 0   Q2: Feeling down, depressed or hopeless 0   PHQ-2 Score 0   Q1: Little interest or pleasure in doing things Not at all   Q2: Feeling down, depressed or hopeless Not at all   PHQ-2 Score 0           5/6/2024   Substance Use   Alcohol more than 3/day or more than 7/wk No   Do you use any other substances recreationally? No     Social History     Tobacco Use    Smoking status: Never    Smokeless tobacco: Never   Vaping Use    Vaping status: Never Used   Substance Use Topics    Alcohol use: Yes     Comment: very infrequently    Drug use: No           5/2/2023   LAST FHS-7 RESULTS   1st degree relative breast or ovarian cancer Yes   Any relative bilateral breast cancer No   Any male have breast cancer No   Any ONE woman have BOTH breast AND ovarian cancer No   Any woman with breast cancer before 50yrs Yes   2 or more relatives with breast AND/OR ovarian cancer No   2 or more relatives with breast AND/OR bowel cancer No                5/6/2024   STI Screening   New sexual partner(s) since last STI/HIV test? No     History of abnormal Pap smear: No - age 30- 64 PAP with HPV every 5 years recommended        Latest Ref Rng & Units 3/8/2019     9:00 AM 3/7/2019     2:16 PM 2/22/2016    12:00 AM   PAP / HPV   PAP (Historical)   NIL     HPV 16 DNA NEG^Negative Negative      HPV 18 DNA NEG^Negative Negative      Other HR HPV NEG^Negative Negative      PAP-ABSTRACT    See Scanned Document           This result is from an external source.           5/6/2024   Contraception/Family Planning   Questions about contraception or family planning No        Reviewed and updated as needed this visit by Provider                          Review of Systems  Constitutional, HEENT, cardiovascular, pulmonary, gi and gu systems are negative, except as otherwise noted.     Objective    Exam  /79   Pulse 89   Temp 98.2  F (36.8  C) (Temporal)   Wt 84.4 kg (186 lb)   LMP 04/17/2024   SpO2 96%   BMI 30.22 kg/m     Estimated body  "mass index is 30.22 kg/m  as calculated from the following:    Height as of 4/6/23: 1.671 m (5' 5.79\").    Weight as of this encounter: 84.4 kg (186 lb).    Physical Exam  GENERAL: alert and no distress  EYES: Eyes grossly normal to inspection, PERRL and conjunctivae and sclerae normal  HENT: ear canals and TM's normal, nose and mouth without ulcers or lesions  NECK: no adenopathy, no asymmetry, masses, or scars  RESP: lungs clear to auscultation - no rales, rhonchi or wheezes  BREAST: normal without masses, tenderness or nipple discharge and no palpable axillary masses or adenopathy  CV: regular rate and rhythm, normal S1 S2, no S3 or S4, no murmur, click or rub, no peripheral edema  ABDOMEN: soft, nontender, no hepatosplenomegaly, no masses and bowel sounds normal  MS: no gross musculoskeletal defects noted, no edema  SKIN: no suspicious lesions or rashes  NEURO: Normal strength and tone, mentation intact and speech normal  PSYCH: mentation appears normal, affect normal/bright        Signed Electronically by: Zhane Mckenzie MD    "

## 2024-05-06 NOTE — PATIENT INSTRUCTIONS
"Preventive Care Advice   This is general advice we often give to help people stay healthy. Your care team may have specific advice just for you. Please talk to your care team about your own preventive care needs.  Lifestyle  Exercise at least 150 minutes each week (30 minutes a day, 5 days a week).  Do muscle strengthening activities 2 days a week. These help control your weight and prevent disease.  No smoking.  Wear sunscreen to prevent skin cancer.  Have your home tested for radon every 2 to 5 years. Radon is a colorless, odorless gas that can harm your lungs. To learn more, go to www.health.[facility].mn. and search for \"Radon in Homes.\"  Keep guns unloaded and locked up in a safe place like a safe or gun vault, or, use a gun lock and hide the keys. Always lock away bullets separately. To learn more, visit [place].gov and search for \"safe gun storage.\"  Nutrition  Eat 5 or more servings of fruits and vegetables each day.  Try wheat bread, brown rice and whole grain pasta (instead of white bread, rice, and pasta).  Get enough calcium and vitamin D. Check the label on foods and aim for 100% of the RDA (recommended daily allowance).  Regular exams  Have a dental exam and cleaning every 6 months.  See your health care team every year to talk about:  Any changes in your health.  Any medicines your care team has prescribed.  Preventive care, family planning, and ways to prevent chronic diseases.  Shots (vaccines)   HPV shots (up to age 26), if you've never had them before.  Hepatitis B shots (up to age 59), if you've never had them before.  COVID-19 shot: Get this shot when it's due.  Flu shot: Get a flu shot every year.  Tetanus shot: Get a tetanus shot every 10 years.  Pneumococcal, hepatitis A, and RSV shots: Ask your care team if you need these based on your risk.  Shingles shot (for age 50 and up).  General health tests  Diabetes screening:  Starting at age 35, Get screened for diabetes at least every 3 years.  If " you are younger than age 35, ask your care team if you should be screened for diabetes.  Cholesterol test: At age 39, start having a cholesterol test every 5 years, or more often if advised.  Bone density scan (DEXA): At age 50, ask your care team if you should have this scan for osteoporosis (brittle bones).  Hepatitis C: Get tested at least once in your life.  Abdominal aortic aneurysm screening: Talk to your doctor about having this screening if you:  Have ever smoked; and  Are biologically male; and  Are between the ages of 65 and 75.  STIs (sexually transmitted infections)  Before age 24: Ask your care team if you should be screened for STIs.  After age 24: Get screened for STIs if you're at risk. You are at risk for STIs (including HIV) if:  You are sexually active with more than one person.  You don't use condoms every time.  You or a partner was diagnosed with a sexually transmitted infection.  If you are at risk for HIV, ask about PrEP medicine to prevent HIV.  Get tested for HIV at least once in your life, whether you are at risk for HIV or not.  Cancer screening tests  Cervical cancer screening: If you have a cervix, begin getting regular cervical cancer screening tests at age 21. Most people who have regular screenings with normal results can stop after age 65. Talk about this with your provider.  Breast cancer scan (mammogram): If you've ever had breasts, begin having regular mammograms starting at age 40. This is a scan to check for breast cancer.  Colon cancer screening: It is important to start screening for colon cancer at age 45.  Have a colonoscopy test every 10 years (or more often if you're at risk) Or, ask your provider about stool tests like a FIT test every year or Cologuard test every 3 years.  To learn more about your testing options, visit: www.Lydia/498128.pdf.  For help making a decision, visit: reinaldo/su57534.  Prostate cancer screening test: If you have a prostate and are age 55  to 69, ask your provider if you would benefit from a yearly prostate cancer screening test.  Lung cancer screening: If you are a current or former smoker age 50 to 80, ask your care team if ongoing lung cancer screenings are right for you.  For informational purposes only. Not to replace the advice of your health care provider. Copyright   2023 [employer]. All rights reserved. Clinically reviewed by the [facility] Transitions Program. [employer] 744202 - REV 04/24.

## 2024-05-06 NOTE — COMMUNITY RESOURCES LIST (ENGLISH)
May 6, 2024           YOUR PERSONALIZED LIST OF SERVICES & PROGRAMS           & SHELTER    Housing      [facility] - Hotline - Housing crisis  [address] [place] [address] (Distance: 3.0 miles)  Phone: (827) 710-5448  Website: http://www.saintolaf.org/  Language: English  Fee: Free  Accessibility: Ada accessible      [facility] - Adult penitentiary [facility]  [address] [place] [address] (Distance: 3.0 miles)  Phone: [phone]) [phone]  Website: http://www.saintolaf.org/  Language: English  Fee: Free  Accessibility: Ada accessible      Home Health Care [facility]  Phone: [phone]) [phone]  Website: https://www.ImageVision/  Language: English, Hmong, Oromo, Zimbabwean, Italian  Hours: Mon 9:00 AM - 5:00 PM Tue 9:00 AM - 5:00 PM Wed 9:00 AM - 5:00 PM Thu 9:00 AM - 5:00 PM Fri 9:00 AM - 5:00 PM  Fee: Insurance  Accessibility: Blind accommodation, Deaf or hard of hearing, Translation services  Transportation Options: Free transportation    Case Management      Housing Online - https://[employer]/  [address] [place] [address] (Distance: 3.3 miles)  Website: https://Mob Science  Language: English      - Online housing search assistance  [address] [address] (Distance: 3.4 miles)  Phone: (665) 542-9296  Website: http://www.housinglink.org/  Language: English, Italian, Zimbabwean, Hmong  Accessibility: [employer] - Housing Stabilization Services  Phone: (140) 263-3350  Website: https://MR Presta.[employer]/  Language: English  Hours: Mon 8:00 AM - 4:00 PM Tue 8:00 AM - 4:00 PM Wed 8:00 AM - 4:00 PM Thu 8:00 AM - 4:00 PM Fri 8:00 AM - 4:00 PM  Fee: Free  Accessibility: Blind accommodation, Deaf or hard of hearing  Transportation Options: Free transportation    Drop-In Services      [facility] - Drop-in center or day shelter  [address]  [address] [place] [address] (Distance: 2.8 miles)  Phone: [phone]) [phone]  Language: English  Fee: Free  Accessibility: Ada accessible, Translation services      [facility] - Drop-in center or day shelter  [address] [place] [address] (Distance: 4.3 miles)  Phone: [phone]) [phone]  Language: English  Fee: Free      [employer]UNDRY LOVE  Website: http://www.laundrylove.org               IMPORTANT NUMBERS & WEBSITES        Emergency Services  911  .   United Way  211 http://211unitedway.org  .   Poison Control  (780) 619-4590 http://[place]poison.org http://wisconsinpoison.org  .     Suicide and Crisis Lifeline  988 http://988Provenance Biopharmaceuticalsline.org  .   Childhelp [place] Child Abuse Hotline  957.238.6775 http://Childhelphotline.org   .   [place] Sexual Assault Hotline  (537) 941-4918 (HOPE) http://Mammotomen.org   .     [place] Runaway Safeline  (368) 430-2763 (RUNAWAY) http://igobubble.Unemployment-Extension.Org  .   Pregnancy & Postpartum Support  Call/text 372-868-4173  [place]: http://ppsupportmn.org  [place]: http://Hubkick.com/wi  .   Substance Abuse National Helpline ([facility])  509-643-HELP (9676) http://Findtreatment.gov   .                DISCLAIMER: These resources have been generated via the [employer] Platform. [employer] does not endorse any service providers mentioned in this resource list. [employer] does not guarantee that the services mentioned in this resource list will be available to you or will improve your health or wellness.    [facility]

## 2024-05-06 NOTE — PROGRESS NOTES
Preventive Care Visit  [facility]  LismoreCasandra Mckenzie MD, Family Medicine  May 6, 2024  {Provider  Link to [facility] :681605}    {PROVIDER CHARTING PREFERENCE:916150}    Angel Jaimes is a 39 year old, presenting for the following:  Physical        5/6/2024     3:16 PM   Additional Questions   Roomed by Deysi TALAMANTES MA   Accompanied by self         5/6/2024     3:16 PM   Patient Reported Additional Medications   Patient reports taking the following new medications none        Health Care Directive  Patient does not have a Health Care Directive or Living Will:     History of Present Illness       Hypothyroidism:     Since last visit, patient describes the following symptoms::  None    She eats 2-3 servings of fruits and vegetables daily.She consumes 1 sweetened beverage(s) daily.She exercises with enough effort to increase her heart rate 10 to 19 minutes per day.  She exercises with enough effort to increase her heart rate 3 or less days per week. She is missing 2 dose(s) of medications per week.  She is not taking prescribed medications regularly due to remembering to take.  Healthy Habits:     Taking medications regularly:  2    Barriers to taking medications:  Remembering to take             No data to display                  4/6/2023   Nutrition   Three or more servings of calcium each day? Yes   Diet: other         4/6/2023   Exercise   Frequency of exercise: 2-3 days/week            4/6/2023   Dental   Dentist two times every year? Yes            Today's PHQ-2 Score:       5/5/2024     5:09 PM   PHQ-2 ( 1999 Pfizer)   Q1: Little interest or pleasure in doing things 0   Q2: Feeling down, depressed or hopeless 0   PHQ-2 Score 0   Q1: Little interest or pleasure in doing things Not at all   Q2: Feeling down, depressed or hopeless Not at all   PHQ-2 Score 0           4/6/2023   Substance Use   Alcohol more than 3/day or more than 7/wk No     Social History     Tobacco Use    Smoking status:  Never    Smokeless tobacco: Never   Vaping Use    Vaping status: Never Used   Substance Use Topics    Alcohol use: Yes     Comment: very infrequently    Drug use: No     {Provider  If there are gaps in the social history shown above, please follow the link to update and then refresh the note Link to Social and Substance History :978790}      5/2/2023   LAST FHS-7 RESULTS   1st degree relative breast or ovarian cancer Yes   Any relative bilateral breast cancer No   Any male have breast cancer No   Any ONE woman have BOTH breast AND ovarian cancer No   Any woman with breast cancer before 50yrs Yes   2 or more relatives with breast AND/OR ovarian cancer No   2 or more relatives with breast AND/OR bowel cancer No     {If any of the questions to the FHS7 are answered yes, consider referral for genetic counseling.    Additional indications for genetic referral include personal history of breast or ovarian cancer, genetic mutation in 1st degree relative which increases risk of breast cancer including BRCA1, BRCA2, ALBERT, PALB 2, TP53, CHEK2, PTEN, CDH1, STK11 (per ACS) and/or 1st degree relative with history of pancreatic or high-risk prostate cancer (per NCCN):656333}   {Mammogram Decision Support (Optional):047620}      History of abnormal Pap smear: { :746086}        Latest Ref Rng & Units 3/8/2019     9:00 AM 3/7/2019     2:16 PM 2/22/2016    12:00 AM   PAP / HPV   PAP (Historical)   NIL     HPV 16 DNA NEG^Negative Negative      HPV 18 DNA NEG^Negative Negative      Other HR HPV NEG^Negative Negative      PAP-ABSTRACT    See Scanned Document           This result is from an external source.            No data to display              {Provider  Use the storyboard to review patient history, after sections have been marked as reviewed, refresh note to capture documentation:179750}   Reviewed and updated as needed this visit by Provider                    {HISTORY OPTIONS (Optional):897095}    {ROS Picklists  "(Optional):130493}     Objective    Exam  There were no vitals taken for this visit.   Estimated body mass index is 30.54 kg/m  as calculated from the following:    Height as of 4/6/23: 1.671 m (5' 5.79\").    Weight as of 7/7/23: 85.3 kg (188 lb).    Physical Exam  {Exam Choices (Optional):927200}        Signed Electronically by: Zhane Mckenzie MD  {Email feedback regarding this note to [email or web address]   :371952}  "

## 2024-05-10 LAB
BKR LAB AP GYN ADEQUACY: NORMAL
BKR LAB AP GYN INTERPRETATION: NORMAL
BKR LAB AP HPV REFLEX: NORMAL
BKR LAB AP PREVIOUS ABNORMAL: NORMAL
PATH REPORT.COMMENTS IMP SPEC: NORMAL
PATH REPORT.COMMENTS IMP SPEC: NORMAL
PATH REPORT.RELEVANT HX SPEC: NORMAL

## 2024-05-17 LAB
HUMAN PAPILLOMA VIRUS 16 DNA: NEGATIVE
HUMAN PAPILLOMA VIRUS 18 DNA: NEGATIVE
HUMAN PAPILLOMA VIRUS FINAL DIAGNOSIS: NORMAL
HUMAN PAPILLOMA VIRUS OTHER HR: NEGATIVE

## 2024-06-13 ENCOUNTER — TELEPHONE (OUTPATIENT)
Dept: DERMATOLOGY | Facility: CLINIC | Age: 39
End: 2024-06-13
Payer: COMMERCIAL

## 2024-06-13 NOTE — PROGRESS NOTES
Patient confirmed scheduled appointment:     Date: 9/19  Time: 7:20 AM  Visit type: Return dermatology  Provider: Dr. Hendrix  Location: [facility]  Additonal Notes: Rescheduled from later appt with Dr. Fuentes

## 2024-09-17 NOTE — PROGRESS NOTES
[facility] Dermatology Note  Encounter Date: Sep 19, 2024  Office Visit     Dermatology Problem List:  #Dyshidrotic eczema   - Current tx: Tacrolimus, Clobetasol  - Future: patch testing   ____________________________________________    Assessment & Plan:     # Dyshidrotic eczema, left hand  -Reviewed likely diagnosis including dyshidrotic eczema, but discussed possible contact dermatitis component as given localization to left hand  -Start topical clobetasol 0.5% ointment twice daily.  Reviewed application of Vaseline and cotton glove following nighttime application to allow for overnight occlusion.  -Given concerns for skin atrophy, start tacrolimus twice daily for maintenance.  -Continue to use fragrance free soaps and lotions.  -Apply Vaseline multiple times daily as needed  -If no improvement, consider patch testing    # Scar with associated postinflammatory hyperpigmentation, right forearm  -Reviewed etiology and benign nature  -Provided reassurance and discussed cosmetic treatment options, which patient declines at this time    Procedures Performed: None    Follow-up: prn for new or changing lesions    Staff and Resident: [unfilled] Patient seen and staffed with Dr. Leblanc.       Carmen Hendrix MD  Dermatology Resident PGY-2    Patient was seen and examined with the dermatology resident. I agree with the history, review of systems, physical examination, assessments and plan.    Concha Leblanc MD  Professor   Department of Dermatology  [facility]     ____________________________________________    CC: Derm Problem (Fiona is here today for a lesion of concern on the right arm and eczema on the left hand )    HPI:  Ms. Awilda Campos is a(n) 39 year old female who presents today as a new patient for spot check on her right arm and hand eczema.     Patient reports that she has had a spot on her right arm for approximately the past year.  She reports initially it was a  smooth bump, which is since turned into a darker scar.  Patient notes that the initial bump was not itchy or scab-like.  She denies any trauma to the area, but notes approximately 6 months ago it sort of flattened out and drained some clear fluid.  Since then, there has been a dark scar.  Denies any symptoms related to the lesion and is not bothered by the cosmetic appearance, but just wanted to make sure that it was not anything concerning.    Additionally, reports chronic history of hand eczema since she was a child.  She reports having more symptoms in the last few months to her left hand.  She works as a [employer] list, and reports wearing gloves frequently.  Additionally reports using unscented soaps and lotions for the most part.  She has been using triamcinolone ointment once daily at its worst, but uses Eucerin ointment as needed between flares.  This has been her worst flare, but does report some improvement with the triamcinolone.  Denies any contact with chemicals or additional products to the left hand that would not be applied to her right hand.    Patient is otherwise feeling well, without additional skin concerns.    Labs Reviewed:  N/A    Physical Exam:  Vitals: There were no vitals taken for this visit.  SKIN: Focused examination of hands, feet, and right arm was performed.  - Eczematous plaques to the palm and dorsal aspect of the 3-5th digits with faint areas of hyperpigmented macules interspersed and linear desquamation  - There is a pink to hyperpigmented plaque with central white atrophic papule on the right forearm.   - No other lesions of concern on areas examined.         Medications:  Current Outpatient Medications   Medication Sig Dispense Refill    albuterol (PROAIR HFA/PROVENTIL HFA/VENTOLIN HFA) 108 (90 Base) MCG/ACT inhaler Inhale 2 puffs into the lungs every 6 hours as needed for shortness of breath or wheezing 18 g 3    amphetamine-dextroamphetamine (ADDERALL XR) 10 MG 24 hr  capsule Take 1 capsule (10 mg) by mouth daily 30 capsule 0    levothyroxine (SYNTHROID/LEVOTHROID) 150 MCG tablet Take 1 tablet (150 mcg) by mouth daily 90 tablet 3    triamcinolone (KENALOG) 0.1 % external ointment Apply topically 2 times daily 80 g 1    Cholecalciferol (VITAMIN D-3 PO)  (Patient not taking: Reported on 9/19/2024)      Probiotic Product (PROBIOTIC DAILY PO)  (Patient not taking: Reported on 9/19/2024)       No current facility-administered medications for this visit.      Past Medical History:   Patient Active Problem List   Diagnosis    CARDIOVASCULAR SCREENING; LDL GOAL LESS THAN 160    Hashimoto's thyroiditis    Mild intermittent asthma without complication    Family history of malignant neoplasm of breast    ADHD (attention deficit hyperactivity disorder), combined type    Eczema of hand    Lichen of skin     Past Medical History:   Diagnosis Date    Asthma     Hashimoto's disease     Lichen sclerosus 11/2019    Uncomplicated asthma     Uses an inhaler as needed       CC Zhane Mckenzie MD  [address] [address] on close of this encounter.

## 2024-09-19 ENCOUNTER — OFFICE VISIT (OUTPATIENT)
Dept: DERMATOLOGY | Facility: CLINIC | Age: 39
End: 2024-09-19
Attending: FAMILY MEDICINE
Payer: COMMERCIAL

## 2024-09-19 DIAGNOSIS — L30.1 DYSHIDROTIC ECZEMA: Primary | ICD-10-CM

## 2024-09-19 DIAGNOSIS — L81.0 POSTINFLAMMATORY HYPERPIGMENTATION: ICD-10-CM

## 2024-09-19 DIAGNOSIS — L98.9 SKIN LESION: ICD-10-CM

## 2024-09-19 PROCEDURE — 99204 OFFICE O/P NEW MOD 45 MIN: CPT | Mod: GC | Performed by: DERMATOLOGY

## 2024-09-19 RX ORDER — TACROLIMUS 1 MG/G
OINTMENT TOPICAL 2 TIMES DAILY
Qty: 60 G | Refills: 4 | Status: SHIPPED | OUTPATIENT
Start: 2024-09-19

## 2024-09-19 RX ORDER — CLOBETASOL PROPIONATE 0.5 MG/G
OINTMENT TOPICAL 2 TIMES DAILY
Qty: 45 G | Refills: 4 | Status: SHIPPED | OUTPATIENT
Start: 2024-09-19

## 2024-09-19 ASSESSMENT — PAIN SCALES - GENERAL: PAINLEVEL: MILD PAIN (3)

## 2024-09-19 NOTE — LETTER
9/19/2024       RE: Awilda Campos  [address]  [facility] [address]     Dear Colleague,    Thank you for referring your patient, Awilda Campos, to the [facility]MAT[facility] at [facility]. Please see a copy of my visit note below.    [facility] Dermatology Note  Encounter Date: Sep 19, 2024  Office Visit     Dermatology Problem List:  #Dyshidrotic eczema   - Current tx: Tacrolimus, Clobetasol  - Future: patch testing   ____________________________________________    Assessment & Plan:     # Dyshidrotic eczema, left hand  -Reviewed likely diagnosis including dyshidrotic eczema, but discussed possible contact dermatitis component as given localization to left hand  -Start topical clobetasol 0.5% ointment twice daily.  Reviewed application of Vaseline and cotton glove following nighttime application to allow for overnight occlusion.  -Given concerns for skin atrophy, start tacrolimus twice daily for maintenance.  -Continue to use fragrance free soaps and lotions.  -Apply Vaseline multiple times daily as needed  -If no improvement, consider patch testing    # Scar with associated postinflammatory hyperpigmentation, right forearm  -Reviewed etiology and benign nature  -Provided reassurance and discussed cosmetic treatment options, which patient declines at this time    Procedures Performed: None    Follow-up: prn for new or changing lesions    Staff and Resident: [unfilled] Patient seen and staffed with Dr. Leblanc.       Carmen Hendrix MD  Dermatology Resident PGY-2    Patient was seen and examined with the dermatology resident. I agree with the history, review of systems, physical examination, assessments and plan.    Concha Leblanc MD  Professor   Department of Dermatology  [facility]     ____________________________________________    CC: Derm Problem (Fiona is here today for a lesion of  concern on the right arm and eczema on the left hand )    HPI:  Ms. Awilda Campos is a(n) 39 year old female who presents today as a new patient for spot check on her right arm and hand eczema.     Patient reports that she has had a spot on her right arm for approximately the past year.  She reports initially it was a smooth bump, which is since turned into a darker scar.  Patient notes that the initial bump was not itchy or scab-like.  She denies any trauma to the area, but notes approximately 6 months ago it sort of flattened out and drained some clear fluid.  Since then, there has been a dark scar.  Denies any symptoms related to the lesion and is not bothered by the cosmetic appearance, but just wanted to make sure that it was not anything concerning.    Additionally, reports chronic history of hand eczema since she was a child.  She reports having more symptoms in the last few months to her left hand.  She works as a [employer] list, and reports wearing gloves frequently.  Additionally reports using unscented soaps and lotions for the most part.  She has been using triamcinolone ointment once daily at its worst, but uses Eucerin ointment as needed between flares.  This has been her worst flare, but does report some improvement with the triamcinolone.  Denies any contact with chemicals or additional products to the left hand that would not be applied to her right hand.    Patient is otherwise feeling well, without additional skin concerns.    Labs Reviewed:  N/A    Physical Exam:  Vitals: There were no vitals taken for this visit.  SKIN: Focused examination of hands, feet, and right arm was performed.  - Eczematous plaques to the palm and dorsal aspect of the 3-5th digits with faint areas of hyperpigmented macules interspersed and linear desquamation  - There is a pink to hyperpigmented plaque with central white atrophic papule on the right forearm.   - No other lesions of concern on areas examined.          Medications:  Current Outpatient Medications   Medication Sig Dispense Refill     albuterol (PROAIR HFA/PROVENTIL HFA/VENTOLIN HFA) 108 (90 Base) MCG/ACT inhaler Inhale 2 puffs into the lungs every 6 hours as needed for shortness of breath or wheezing 18 g 3     amphetamine-dextroamphetamine (ADDERALL XR) 10 MG 24 hr capsule Take 1 capsule (10 mg) by mouth daily 30 capsule 0     levothyroxine (SYNTHROID/LEVOTHROID) 150 MCG tablet Take 1 tablet (150 mcg) by mouth daily 90 tablet 3     triamcinolone (KENALOG) 0.1 % external ointment Apply topically 2 times daily 80 g 1     Cholecalciferol (VITAMIN D-3 PO)  (Patient not taking: Reported on 9/19/2024)       Probiotic Product (PROBIOTIC DAILY PO)  (Patient not taking: Reported on 9/19/2024)       No current facility-administered medications for this visit.      Past Medical History:   Patient Active Problem List   Diagnosis     CARDIOVASCULAR SCREENING; LDL GOAL LESS THAN 160     Hashimoto's thyroiditis     Mild intermittent asthma without complication     Family history of malignant neoplasm of breast     ADHD (attention deficit hyperactivity disorder), combined type     Eczema of hand     Lichen of skin     Past Medical History:   Diagnosis Date     Asthma      Hashimoto's disease      Lichen sclerosus 11/2019     Uncomplicated asthma     Uses an inhaler as needed       CC Zhane Mckenzie MD  [address] [address] on close of this encounter.      Again, thank you for allowing me to participate in the care of your patient.      Sincerely,    Carmen Hendrix MD

## 2024-09-19 NOTE — NURSING NOTE
Dermatology Rooming Note    Awilda Campos's goals for this visit include:   Chief Complaint   Patient presents with    Derm Problem     Fiona is here today for a lesion of concern on the right arm and eczema on the left hand      Emma CAVAZOS CMA     Spontaneous, unlabored and symmetrical

## 2024-09-19 NOTE — PATIENT INSTRUCTIONS
Start clobetasol ointment twice daily to the affected hand for flares. At night, it is okay to apply vaseline on top and then place a cotton glove before going to sleep. Start tacrolimus ointment (non-steroid) twice daily when not flaring. Do not use fragrance soaps or lotions. Apply vaseline liberally multiple times per day as needed. Please let us know if this doesn't improve in 2 months.

## 2024-11-06 ENCOUNTER — MYC REFILL (OUTPATIENT)
Dept: FAMILY MEDICINE | Facility: CLINIC | Age: 39
End: 2024-11-06
Payer: COMMERCIAL

## 2024-11-06 ENCOUNTER — MYC MEDICAL ADVICE (OUTPATIENT)
Dept: FAMILY MEDICINE | Facility: CLINIC | Age: 39
End: 2024-11-06
Payer: COMMERCIAL

## 2024-11-06 DIAGNOSIS — F90.2 ADHD (ATTENTION DEFICIT HYPERACTIVITY DISORDER), COMBINED TYPE: ICD-10-CM

## 2024-11-06 DIAGNOSIS — E06.3 HASHIMOTO'S THYROIDITIS: ICD-10-CM

## 2024-11-06 DIAGNOSIS — F90.2 ADHD (ATTENTION DEFICIT HYPERACTIVITY DISORDER), COMBINED TYPE: Primary | ICD-10-CM

## 2024-11-06 NOTE — TELEPHONE ENCOUNTER
Dr. Phelan,     Please see below [employer] message and advise.   Pt currently prescribed: amphetamine-dextroamphetamine (ADDERALL XR) 10 MG 24 hr capsule    Thanks,   Rhiannon PARKER RN

## 2024-11-07 RX ORDER — DEXTROAMPHETAMINE SACCHARATE, AMPHETAMINE ASPARTATE MONOHYDRATE, DEXTROAMPHETAMINE SULFATE AND AMPHETAMINE SULFATE 5; 5; 5; 5 MG/1; MG/1; MG/1; MG/1
20 CAPSULE, EXTENDED RELEASE ORAL DAILY
Qty: 30 CAPSULE | Refills: 0 | Status: SHIPPED | OUTPATIENT
Start: 2024-11-07 | End: 2024-11-08

## 2024-11-07 RX ORDER — DEXTROAMPHETAMINE SACCHARATE, AMPHETAMINE ASPARTATE MONOHYDRATE, DEXTROAMPHETAMINE SULFATE AND AMPHETAMINE SULFATE 5; 5; 5; 5 MG/1; MG/1; MG/1; MG/1
20 CAPSULE, EXTENDED RELEASE ORAL DAILY
Qty: 30 CAPSULE | Refills: 0 | Status: SHIPPED | OUTPATIENT
Start: 2025-01-06 | End: 2025-02-05

## 2024-11-07 RX ORDER — DEXTROAMPHETAMINE SACCHARATE, AMPHETAMINE ASPARTATE MONOHYDRATE, DEXTROAMPHETAMINE SULFATE AND AMPHETAMINE SULFATE 5; 5; 5; 5 MG/1; MG/1; MG/1; MG/1
20 CAPSULE, EXTENDED RELEASE ORAL DAILY
Qty: 30 CAPSULE | Refills: 0 | Status: SHIPPED | OUTPATIENT
Start: 2024-12-07 | End: 2025-01-06

## 2024-11-07 NOTE — TELEPHONE ENCOUNTER
Called and LVM To schedule a Future In Person Med Follow up and let her know that 3 Rx's were placed up at the  for   Samantha Kramer  Care Unit Coordinator

## 2024-11-07 NOTE — TELEPHONE ENCOUNTER
I recommend that we print the scripts for 3 months and that she take them to the pharamcy that seesm to be most reliable with this.  She should be able to drop off all 3 scripts and tehn have them fill them one month at a time    I'll place thes at the  for her    Our protocol is to be seen in person every 6 months and to have virtual check ins in between these 6 month visits.  Please set her up for an in person visit  - she is due to sign a new CSA    Thank you    SN

## 2024-11-08 RX ORDER — LEVOTHYROXINE SODIUM 150 UG/1
150 TABLET ORAL DAILY
Qty: 90 TABLET | Refills: 1 | Status: SHIPPED | OUTPATIENT
Start: 2024-11-08

## 2024-11-08 RX ORDER — DEXTROAMPHETAMINE SACCHARATE, AMPHETAMINE ASPARTATE MONOHYDRATE, DEXTROAMPHETAMINE SULFATE AND AMPHETAMINE SULFATE 5; 5; 5; 5 MG/1; MG/1; MG/1; MG/1
20 CAPSULE, EXTENDED RELEASE ORAL DAILY
Qty: 30 CAPSULE | Refills: 0 | Status: SHIPPED | OUTPATIENT
Start: 2024-11-08

## 2024-11-08 NOTE — TELEPHONE ENCOUNTER
Perfect - I signed the meds for the current month and will see her for a follow up visit to re-sign the CSA and review vitals etc    SN

## 2024-11-08 NOTE — TELEPHONE ENCOUNTER
SN/DOD,  Patient calling with update.  Was able to find pharmacy with prescription in stock for this month.  Would like transferred to there.  [facility].  Per patient, other prescriptions also have to go through there.  Pended updated levothyroxine prescription to go through [facility], but did advise patient other medications are managed by dermatology, should go through them for refills.  Patient could also try calling [facility] to transfer other prescriptions to them.    RN removed prescription for 11/7 from paper prescriptions at .  Destroyed, witnessed by LAY Saldivar.  Patient still plans to  December and January paper prescriptions.    Marielos CAVAZOS RN

## 2024-11-11 RX ORDER — DEXTROAMPHETAMINE SACCHARATE, AMPHETAMINE ASPARTATE MONOHYDRATE, DEXTROAMPHETAMINE SULFATE AND AMPHETAMINE SULFATE 2.5; 2.5; 2.5; 2.5 MG/1; MG/1; MG/1; MG/1
10 CAPSULE, EXTENDED RELEASE ORAL DAILY
Qty: 30 CAPSULE | Refills: 0 | Status: SHIPPED | OUTPATIENT
Start: 2024-11-11

## 2024-11-17 ENCOUNTER — MYC MEDICAL ADVICE (OUTPATIENT)
Dept: FAMILY MEDICINE | Facility: CLINIC | Age: 39
End: 2024-11-17
Payer: COMMERCIAL

## 2025-01-29 ENCOUNTER — OFFICE VISIT (OUTPATIENT)
Dept: FAMILY MEDICINE | Facility: CLINIC | Age: 40
End: 2025-01-29
Payer: COMMERCIAL

## 2025-01-29 VITALS
DIASTOLIC BLOOD PRESSURE: 77 MMHG | WEIGHT: 171.3 LBS | HEART RATE: 80 BPM | SYSTOLIC BLOOD PRESSURE: 122 MMHG | HEIGHT: 66 IN | TEMPERATURE: 97.9 F | BODY MASS INDEX: 27.53 KG/M2 | OXYGEN SATURATION: 98 % | RESPIRATION RATE: 16 BRPM

## 2025-01-29 DIAGNOSIS — F90.2 ADHD (ATTENTION DEFICIT HYPERACTIVITY DISORDER), COMBINED TYPE: Primary | ICD-10-CM

## 2025-01-29 PROCEDURE — 99213 OFFICE O/P EST LOW 20 MIN: CPT | Performed by: FAMILY MEDICINE

## 2025-01-29 RX ORDER — DEXTROAMPHETAMINE SACCHARATE, AMPHETAMINE ASPARTATE MONOHYDRATE, DEXTROAMPHETAMINE SULFATE AND AMPHETAMINE SULFATE 5; 5; 5; 5 MG/1; MG/1; MG/1; MG/1
20 CAPSULE, EXTENDED RELEASE ORAL DAILY
Qty: 30 CAPSULE | Refills: 0 | Status: SHIPPED | OUTPATIENT
Start: 2025-01-29 | End: 2025-02-28

## 2025-01-29 RX ORDER — DEXTROAMPHETAMINE SACCHARATE, AMPHETAMINE ASPARTATE MONOHYDRATE, DEXTROAMPHETAMINE SULFATE AND AMPHETAMINE SULFATE 5; 5; 5; 5 MG/1; MG/1; MG/1; MG/1
20 CAPSULE, EXTENDED RELEASE ORAL DAILY
Qty: 30 CAPSULE | Refills: 0 | Status: SHIPPED | OUTPATIENT
Start: 2025-02-28 | End: 2025-03-30

## 2025-01-29 RX ORDER — DEXTROAMPHETAMINE SACCHARATE, AMPHETAMINE ASPARTATE MONOHYDRATE, DEXTROAMPHETAMINE SULFATE AND AMPHETAMINE SULFATE 5; 5; 5; 5 MG/1; MG/1; MG/1; MG/1
20 CAPSULE, EXTENDED RELEASE ORAL DAILY
Qty: 30 CAPSULE | Refills: 0 | Status: SHIPPED | OUTPATIENT
Start: 2025-03-30 | End: 2025-04-29

## 2025-01-29 ASSESSMENT — PAIN SCALES - GENERAL: PAINLEVEL_OUTOF10: NO PAIN (0)

## 2025-01-29 ASSESSMENT — ASTHMA QUESTIONNAIRES
QUESTION_4 LAST FOUR WEEKS HOW OFTEN HAVE YOU USED YOUR RESCUE INHALER OR NEBULIZER MEDICATION (SUCH AS ALBUTEROL): ONCE A WEEK OR LESS
ACT_TOTALSCORE: 24
QUESTION_5 LAST FOUR WEEKS HOW WOULD YOU RATE YOUR ASTHMA CONTROL: COMPLETELY CONTROLLED
QUESTION_1 LAST FOUR WEEKS HOW MUCH OF THE TIME DID YOUR ASTHMA KEEP YOU FROM GETTING AS MUCH DONE AT WORK, SCHOOL OR AT HOME: NONE OF THE TIME
QUESTION_2 LAST FOUR WEEKS HOW OFTEN HAVE YOU HAD SHORTNESS OF BREATH: NOT AT ALL
ACT_TOTALSCORE: 24
QUESTION_3 LAST FOUR WEEKS HOW OFTEN DID YOUR ASTHMA SYMPTOMS (WHEEZING, COUGHING, SHORTNESS OF BREATH, CHEST TIGHTNESS OR PAIN) WAKE YOU UP AT NIGHT OR EARLIER THAN USUAL IN THE MORNING: NOT AT ALL

## 2025-01-29 ASSESSMENT — PATIENT HEALTH QUESTIONNAIRE - PHQ9
SUM OF ALL RESPONSES TO PHQ QUESTIONS 1-9: 0
SUM OF ALL RESPONSES TO PHQ QUESTIONS 1-9: 0

## 2025-01-29 ASSESSMENT — ANXIETY QUESTIONNAIRES
7. FEELING AFRAID AS IF SOMETHING AWFUL MIGHT HAPPEN: NOT AT ALL
GAD7 TOTAL SCORE: 1
IF YOU CHECKED OFF ANY PROBLEMS ON THIS QUESTIONNAIRE, HOW DIFFICULT HAVE THESE PROBLEMS MADE IT FOR YOU TO DO YOUR WORK, TAKE CARE OF THINGS AT HOME, OR GET ALONG WITH OTHER PEOPLE: NOT DIFFICULT AT ALL
2. NOT BEING ABLE TO STOP OR CONTROL WORRYING: NOT AT ALL
1. FEELING NERVOUS, ANXIOUS, OR ON EDGE: NOT AT ALL
3. WORRYING TOO MUCH ABOUT DIFFERENT THINGS: NOT AT ALL
4. TROUBLE RELAXING: SEVERAL DAYS
8. IF YOU CHECKED OFF ANY PROBLEMS, HOW DIFFICULT HAVE THESE MADE IT FOR YOU TO DO YOUR WORK, TAKE CARE OF THINGS AT HOME, OR GET ALONG WITH OTHER PEOPLE?: NOT DIFFICULT AT ALL
6. BECOMING EASILY ANNOYED OR IRRITABLE: NOT AT ALL
7. FEELING AFRAID AS IF SOMETHING AWFUL MIGHT HAPPEN: NOT AT ALL
GAD7 TOTAL SCORE: 1
5. BEING SO RESTLESS THAT IT IS HARD TO SIT STILL: NOT AT ALL
GAD7 TOTAL SCORE: 1

## 2025-01-29 NOTE — LETTER
[facility] [place]  01/29/25  Patient: Awilda Campos  YOB: 1985  Medical Record Number: [id number]                                                                                  Non-Opioid Controlled Substance Agreement    This is an agreement between you and your provider regarding safe and appropriate use of controlled substances prescribed by your care team. Controlled substances are?medicines that can cause physical and mental dependence (abuse).     There are strict laws about having and using these medicines. We here at [facility] are  committed to working with you in your efforts to get better. To support you in this work, we'll help you schedule regular office appointments for medicine refills. If we must cancel or change your appointment for any reason, we'll make sure you have enough medicine to last until your next appointment.     As a Provider, I will:   Listen carefully to your concerns while treating you with respect.   Recommend a treatment plan that I believe is in your best interest and may involve therapies other than medicine.    Talk with you often about the possible benefits and the risk of harm of any medicine that we prescribe for you.  Assess the safety of this medicine and check how well it works.    Provide a plan on how to taper (discontinue or go off) using this medicine if the decision is made to stop its use.      ::  As a Patient, I understand controlled substances:     Are prescribed by my care provider to help me function or work and manage my condition(s).?  Are strong medicines and can cause serious side effects.     Need to be taken exactly as prescribed.?Combining controlled substances with certain medicines or chemicals (such as illegal drugs, alcohol, sedatives, sleeping pills, and benzodiazepines) can be dangerous or even fatal.? If I stop taking my medicines suddenly, I may have severe withdrawal symptoms.     The risks, benefits, and side  effects of these medicine(s) were explained to me. I agree that:    I will take part in other treatments as advised by my care team. This may be psychiatry or counseling, physical therapy, behavioral therapy, group treatment or a referral to specialist.    I will keep all my appointments and understand this is part of the monitoring of controlled substances.?My care team may require an office visit for EVERY controlled substance refill. If I miss appointments or don t follow instructions, my care team may stop my medicine    I will take my medicines as prescribed. I will not change the dose or schedule unless my care team tells me to. There will be no refills if I run out early.      I may be asked to come to the clinic and complete a urine drug test or complete a pill count. If I don t give a urine sample or participate in a pill count, the care team may stop my medicine.    I will only receive controlled substance prescriptions from this clinic. If I am treated by another provider, I will tell them that I am taking controlled substances and that I have a treatment agreement with this provider. I will inform my [facility] care team within one business day if I am given a prescription for any controlled substance by another healthcare provider. My [facility] care team can contact other providers and pharmacists about my use of any medicines.    It is up to me to make sure that I don't run out of my medicines on weekends or holidays.?If my care team is willing to refill my prescription without a visit, I must request refills only during office hours. Refills may take up to 3 business days to process. I will use one pharmacy to fill all my controlled substance prescriptions. I will notify the clinic about any changes to my insurance or medicine availability.    I am responsible for my prescriptions. If the medicine/prescription is lost, stolen or destroyed, it will not be replaced.?I also agree not to  share controlled substance medicines with anyone.     I am aware I should not use any illegal or recreational drugs. I agree not to drink alcohol unless my care team says I can.     If I enroll in the [place] [facility] Cannabis program, I will tell my care team before my next refill.    I will tell my care team right away if I become pregnant, have a new medical problem treated outside of my regular clinic, or have a change in my medicines.     I understand that this medicine can affect my thinking, judgment and reaction time.? Alcohol and drugs affect the brain and body, which can affect the safety of my driving. Being under the influence of alcohol or drugs can affect my decision-making, behaviors, personal safety and the safety of others. Driving while impaired (DWI) can occur if a person is driving, operating or in physical control of a car, motorcycle, boat, snowmobile, ATV, motorbike, off-road vehicle or any other motor vehicle ([place] Statute 169A.20). I understand the risk if I choose to drive or operate any vehicle or machinery.    I understand that if I do not follow any of the conditions above, my prescriptions or treatment may be stopped or changed.   I agree that my provider, clinic care team and pharmacy may work with any city, state or federal law enforcement agency that investigates the misuse, sale or other diversion of my controlled medicine. I will allow my provider to discuss my care with, or share a copy of, this agreement with any other treating provider, pharmacy or emergency room where I receive care.     I have read this agreement and have asked questions about anything I did not understand.    ________________________________________________________  Patient Signature - Awilda Campos     ___________________                   Date     ________________________________________________________  Provider Signature - Zhane Mckenzie MD       ___________________                   Date      ________________________________________________________  Witness Signature (required if provider not present while patient signing)          ___________________                   Date

## 2025-01-29 NOTE — PROGRESS NOTES
"  Assessment & Plan     (F90.2) ADHD (attention deficit hyperactivity disorder), combined type  (primary encounter diagnosis)  Comment: Tolerating medication well no side effects no concerns.  PDMP is reviewed and this is showing no drug drug interactions or concerns with filling.  Controlled substance agreement filled and to be scanned on problem list was updated.  Plan: amphetamine-dextroamphetamine (ADDERALL XR) 20         MG 24 hr capsule, amphetamine-dextroamphetamine        (ADDERALL XR) 20 MG 24 hr capsule,         amphetamine-dextroamphetamine (ADDERALL XR) 20         MG 24 hr capsule          Will schedule an e-visit or virtual visit for the next 3 months and see me in 6 months in person.        BMI  Estimated body mass index is 27.65 kg/m  as calculated from the following:    Height as of this encounter: 1.676 m (5' 6\").    Weight as of this encounter: 77.7 kg (171 lb 4.8 oz).             Angel Jaimes is a 39 year old, presenting for the following health issues:  A.D.H.D and Thyroid Problem        1/29/2025     2:02 PM   Additional Questions   Roomed by Chloe     History of Present Illness       Hypothyroidism:     Since last visit, patient describes the following symptoms::  None    Reason for visit:  Follow up    She eats 4 or more servings of fruits and vegetables daily.She consumes 1 sweetened beverage(s) daily.She exercises with enough effort to increase her heart rate 60 or more minutes per day.  She exercises with enough effort to increase her heart rate 4 days per week. She is missing 2 dose(s) of medications per week.  She is not taking prescribed medications regularly due to remembering to take.     ADHD Follow-Up    Date of last ADHD office visit: 5/06/24  Status since last visit: Stable  Taking controlled (daily) medications as prescribed: Yes                       Parent/Patient Concerns with Medications: None  ADHD Medication       Amphetamines Disp Start End     " "amphetamine-dextroamphetamine (ADDERALL XR) 10 MG 24 hr capsule 30 capsule 11/11/2024 --    Sig - Route: Take 1 capsule (10 mg) by mouth daily. - Oral    Class: E-Prescribe    Earliest Fill Date: 11/11/2024     amphetamine-dextroamphetamine (ADDERALL XR) 20 MG 24 hr capsule 30 capsule 11/8/2024 --    Sig - Route: Take 1 capsule (20 mg) by mouth daily. - Oral    Class: E-Prescribe    Earliest Fill Date: 11/8/2024     amphetamine-dextroamphetamine (ADDERALL XR) 20 MG 24 hr capsule 30 capsule 1/6/2025 2/5/2025    Sig - Route: Take 1 capsule (20 mg) by mouth daily. - Oral    Class: Local Print    Earliest Fill Date: 1/2/2025          Medication Benefits:   Controlled symptoms: Attention span, Finishing tasks, Impulse control, and Peer relations      Medication side effects:  Side effects noted: appetite suppression and dry mouth - not debilitating - is still eating regular meals               Review of Systems  Constitutional, HEENT, cardiovascular, pulmonary, gi and gu systems are negative, except as otherwise noted.  Answers submitted by the patient for this visit:  Patient Health Questionnaire (Submitted on 1/29/2025)  PHQ9 TOTAL SCORE: 0  Patient Health Questionnaire (G7) (Submitted on 1/29/2025)  ROWAN 7 TOTAL SCORE: 1        Objective    /77 (BP Location: Right arm, Patient Position: Sitting, Cuff Size: Adult Regular)   Pulse 80   Temp 97.9  F (36.6  C) (Skin)   Resp 16   Ht 1.676 m (5' 6\")   Wt 77.7 kg (171 lb 4.8 oz)   SpO2 98%   BMI 27.65 kg/m    Body mass index is 27.65 kg/m .  Physical Exam   GENERAL: alert and no distress  RESP: lungs clear to auscultation - no rales, rhonchi or wheezes  CV: regular rate and rhythm, normal S1 S2, no S3 or S4, no murmur, click or rub, no peripheral edema   PSYCH: mentation appears normal, affect normal/bright            Signed Electronically by: Zhane Mckenzie MD    "

## 2025-03-24 ENCOUNTER — MYC REFILL (OUTPATIENT)
Dept: FAMILY MEDICINE | Facility: CLINIC | Age: 40
End: 2025-03-24
Payer: COMMERCIAL

## 2025-03-24 DIAGNOSIS — E06.3 HASHIMOTO'S THYROIDITIS: ICD-10-CM

## 2025-03-24 RX ORDER — LEVOTHYROXINE SODIUM 150 UG/1
150 TABLET ORAL DAILY
Qty: 90 TABLET | Refills: 1 | OUTPATIENT
Start: 2025-03-24

## 2025-04-07 ENCOUNTER — PATIENT OUTREACH (OUTPATIENT)
Dept: CARE COORDINATION | Facility: CLINIC | Age: 40
End: 2025-04-07
Payer: COMMERCIAL

## 2025-04-21 ENCOUNTER — PATIENT OUTREACH (OUTPATIENT)
Dept: CARE COORDINATION | Facility: CLINIC | Age: 40
End: 2025-04-21
Payer: COMMERCIAL

## 2025-06-11 ENCOUNTER — MYC REFILL (OUTPATIENT)
Dept: FAMILY MEDICINE | Facility: CLINIC | Age: 40
End: 2025-06-11
Payer: COMMERCIAL

## 2025-06-11 DIAGNOSIS — E06.3 HASHIMOTO'S THYROIDITIS: ICD-10-CM

## 2025-06-14 ENCOUNTER — HEALTH MAINTENANCE LETTER (OUTPATIENT)
Age: 40
End: 2025-06-14

## 2025-06-15 ENCOUNTER — E-VISIT (OUTPATIENT)
Dept: FAMILY MEDICINE | Facility: CLINIC | Age: 40
End: 2025-06-15
Payer: COMMERCIAL

## 2025-06-15 DIAGNOSIS — F90.2 ADHD (ATTENTION DEFICIT HYPERACTIVITY DISORDER), COMBINED TYPE: Primary | ICD-10-CM

## 2025-06-15 ASSESSMENT — ANXIETY QUESTIONNAIRES
3. WORRYING TOO MUCH ABOUT DIFFERENT THINGS: NOT AT ALL
8. IF YOU CHECKED OFF ANY PROBLEMS, HOW DIFFICULT HAVE THESE MADE IT FOR YOU TO DO YOUR WORK, TAKE CARE OF THINGS AT HOME, OR GET ALONG WITH OTHER PEOPLE?: NOT DIFFICULT AT ALL
IF YOU CHECKED OFF ANY PROBLEMS ON THIS QUESTIONNAIRE, HOW DIFFICULT HAVE THESE PROBLEMS MADE IT FOR YOU TO DO YOUR WORK, TAKE CARE OF THINGS AT HOME, OR GET ALONG WITH OTHER PEOPLE: NOT DIFFICULT AT ALL
5. BEING SO RESTLESS THAT IT IS HARD TO SIT STILL: SEVERAL DAYS
7. FEELING AFRAID AS IF SOMETHING AWFUL MIGHT HAPPEN: NOT AT ALL
4. TROUBLE RELAXING: MORE THAN HALF THE DAYS
GAD7 TOTAL SCORE: 5
6. BECOMING EASILY ANNOYED OR IRRITABLE: MORE THAN HALF THE DAYS
GAD7 TOTAL SCORE: 5
1. FEELING NERVOUS, ANXIOUS, OR ON EDGE: NOT AT ALL
7. FEELING AFRAID AS IF SOMETHING AWFUL MIGHT HAPPEN: NOT AT ALL
2. NOT BEING ABLE TO STOP OR CONTROL WORRYING: NOT AT ALL
GAD7 TOTAL SCORE: 5

## 2025-06-16 RX ORDER — DEXTROAMPHETAMINE SACCHARATE, AMPHETAMINE ASPARTATE MONOHYDRATE, DEXTROAMPHETAMINE SULFATE AND AMPHETAMINE SULFATE 5; 5; 5; 5 MG/1; MG/1; MG/1; MG/1
20 CAPSULE, EXTENDED RELEASE ORAL DAILY
Qty: 30 CAPSULE | Refills: 0 | Status: SHIPPED | OUTPATIENT
Start: 2025-07-16 | End: 2025-08-15

## 2025-06-16 RX ORDER — DEXTROAMPHETAMINE SACCHARATE, AMPHETAMINE ASPARTATE MONOHYDRATE, DEXTROAMPHETAMINE SULFATE AND AMPHETAMINE SULFATE 5; 5; 5; 5 MG/1; MG/1; MG/1; MG/1
20 CAPSULE, EXTENDED RELEASE ORAL DAILY
Qty: 30 CAPSULE | Refills: 0 | Status: SHIPPED | OUTPATIENT
Start: 2025-08-15 | End: 2025-09-14

## 2025-06-16 RX ORDER — LEVOTHYROXINE SODIUM 150 UG/1
150 TABLET ORAL DAILY
Qty: 90 TABLET | Refills: 1 | Status: SHIPPED | OUTPATIENT
Start: 2025-06-16

## 2025-06-16 RX ORDER — DEXTROAMPHETAMINE SACCHARATE, AMPHETAMINE ASPARTATE MONOHYDRATE, DEXTROAMPHETAMINE SULFATE AND AMPHETAMINE SULFATE 5; 5; 5; 5 MG/1; MG/1; MG/1; MG/1
20 CAPSULE, EXTENDED RELEASE ORAL DAILY
Qty: 30 CAPSULE | Refills: 0 | Status: SHIPPED | OUTPATIENT
Start: 2025-06-16 | End: 2025-07-16

## 2025-06-17 ENCOUNTER — LAB (OUTPATIENT)
Dept: LAB | Facility: CLINIC | Age: 40
End: 2025-06-17
Payer: COMMERCIAL

## 2025-06-17 DIAGNOSIS — E06.3 HASHIMOTO'S THYROIDITIS: ICD-10-CM

## 2025-06-17 LAB — TSH SERPL DL<=0.005 MIU/L-ACNC: 0.53 UIU/ML (ref 0.3–4.2)

## 2025-06-17 PROCEDURE — 36415 COLL VENOUS BLD VENIPUNCTURE: CPT

## 2025-06-17 PROCEDURE — 84443 ASSAY THYROID STIM HORMONE: CPT

## 2025-06-19 ENCOUNTER — RESULTS FOLLOW-UP (OUTPATIENT)
Dept: FAMILY MEDICINE | Facility: CLINIC | Age: 40
End: 2025-06-19

## 2025-07-22 ENCOUNTER — MYC REFILL (OUTPATIENT)
Dept: FAMILY MEDICINE | Facility: CLINIC | Age: 40
End: 2025-07-22
Payer: COMMERCIAL

## 2025-07-22 DIAGNOSIS — F90.2 ADHD (ATTENTION DEFICIT HYPERACTIVITY DISORDER), COMBINED TYPE: ICD-10-CM

## 2025-07-22 RX ORDER — DEXTROAMPHETAMINE SACCHARATE, AMPHETAMINE ASPARTATE MONOHYDRATE, DEXTROAMPHETAMINE SULFATE AND AMPHETAMINE SULFATE 5; 5; 5; 5 MG/1; MG/1; MG/1; MG/1
20 CAPSULE, EXTENDED RELEASE ORAL DAILY
Qty: 30 CAPSULE | Refills: 0 | OUTPATIENT
Start: 2025-07-22

## 2025-09-03 ENCOUNTER — OFFICE VISIT (OUTPATIENT)
Dept: FAMILY MEDICINE | Facility: CLINIC | Age: 40
End: 2025-09-03
Payer: COMMERCIAL

## 2025-09-03 VITALS
SYSTOLIC BLOOD PRESSURE: 124 MMHG | DIASTOLIC BLOOD PRESSURE: 68 MMHG | TEMPERATURE: 98.2 F | WEIGHT: 164 LBS | BODY MASS INDEX: 26.47 KG/M2 | OXYGEN SATURATION: 97 % | RESPIRATION RATE: 21 BRPM | HEART RATE: 79 BPM

## 2025-09-03 DIAGNOSIS — F90.2 ADHD (ATTENTION DEFICIT HYPERACTIVITY DISORDER), COMBINED TYPE: Primary | ICD-10-CM

## 2025-09-03 PROCEDURE — 1126F AMNT PAIN NOTED NONE PRSNT: CPT | Performed by: FAMILY MEDICINE

## 2025-09-03 PROCEDURE — 3074F SYST BP LT 130 MM HG: CPT | Performed by: FAMILY MEDICINE

## 2025-09-03 PROCEDURE — 99214 OFFICE O/P EST MOD 30 MIN: CPT | Performed by: FAMILY MEDICINE

## 2025-09-03 PROCEDURE — 3078F DIAST BP <80 MM HG: CPT | Performed by: FAMILY MEDICINE

## 2025-09-03 RX ORDER — DEXTROAMPHETAMINE SACCHARATE, AMPHETAMINE ASPARTATE MONOHYDRATE, DEXTROAMPHETAMINE SULFATE AND AMPHETAMINE SULFATE 6.25; 6.25; 6.25; 6.25 MG/1; MG/1; MG/1; MG/1
25 CAPSULE, EXTENDED RELEASE ORAL DAILY
Qty: 30 CAPSULE | Refills: 0 | Status: SHIPPED | OUTPATIENT
Start: 2025-11-02 | End: 2025-12-02

## 2025-09-03 RX ORDER — DEXTROAMPHETAMINE SACCHARATE, AMPHETAMINE ASPARTATE MONOHYDRATE, DEXTROAMPHETAMINE SULFATE AND AMPHETAMINE SULFATE 6.25; 6.25; 6.25; 6.25 MG/1; MG/1; MG/1; MG/1
25 CAPSULE, EXTENDED RELEASE ORAL DAILY
Qty: 30 CAPSULE | Refills: 0 | Status: SHIPPED | OUTPATIENT
Start: 2025-10-03 | End: 2025-11-02

## 2025-09-03 RX ORDER — DEXTROAMPHETAMINE SACCHARATE, AMPHETAMINE ASPARTATE MONOHYDRATE, DEXTROAMPHETAMINE SULFATE AND AMPHETAMINE SULFATE 6.25; 6.25; 6.25; 6.25 MG/1; MG/1; MG/1; MG/1
25 CAPSULE, EXTENDED RELEASE ORAL DAILY
Qty: 30 CAPSULE | Refills: 0 | Status: SHIPPED | OUTPATIENT
Start: 2025-09-03 | End: 2025-10-03

## 2025-09-03 ASSESSMENT — ASTHMA QUESTIONNAIRES
QUESTION_5 LAST FOUR WEEKS HOW WOULD YOU RATE YOUR ASTHMA CONTROL: COMPLETELY CONTROLLED
ACT_TOTALSCORE: 21
QUESTION_3 LAST FOUR WEEKS HOW OFTEN DID YOUR ASTHMA SYMPTOMS (WHEEZING, COUGHING, SHORTNESS OF BREATH, CHEST TIGHTNESS OR PAIN) WAKE YOU UP AT NIGHT OR EARLIER THAN USUAL IN THE MORNING: NOT AT ALL
QUESTION_1 LAST FOUR WEEKS HOW MUCH OF THE TIME DID YOUR ASTHMA KEEP YOU FROM GETTING AS MUCH DONE AT WORK, SCHOOL OR AT HOME: ALL OF THE TIME
QUESTION_2 LAST FOUR WEEKS HOW OFTEN HAVE YOU HAD SHORTNESS OF BREATH: NOT AT ALL
QUESTION_4 LAST FOUR WEEKS HOW OFTEN HAVE YOU USED YOUR RESCUE INHALER OR NEBULIZER MEDICATION (SUCH AS ALBUTEROL): NOT AT ALL

## 2025-09-03 ASSESSMENT — ANXIETY QUESTIONNAIRES
GAD7 TOTAL SCORE: 5
8. IF YOU CHECKED OFF ANY PROBLEMS, HOW DIFFICULT HAVE THESE MADE IT FOR YOU TO DO YOUR WORK, TAKE CARE OF THINGS AT HOME, OR GET ALONG WITH OTHER PEOPLE?: NOT DIFFICULT AT ALL
IF YOU CHECKED OFF ANY PROBLEMS ON THIS QUESTIONNAIRE, HOW DIFFICULT HAVE THESE PROBLEMS MADE IT FOR YOU TO DO YOUR WORK, TAKE CARE OF THINGS AT HOME, OR GET ALONG WITH OTHER PEOPLE: NOT DIFFICULT AT ALL
4. TROUBLE RELAXING: NOT AT ALL
GAD7 TOTAL SCORE: 5
6. BECOMING EASILY ANNOYED OR IRRITABLE: MORE THAN HALF THE DAYS
5. BEING SO RESTLESS THAT IT IS HARD TO SIT STILL: SEVERAL DAYS
7. FEELING AFRAID AS IF SOMETHING AWFUL MIGHT HAPPEN: SEVERAL DAYS
3. WORRYING TOO MUCH ABOUT DIFFERENT THINGS: NOT AT ALL
1. FEELING NERVOUS, ANXIOUS, OR ON EDGE: NOT AT ALL
GAD7 TOTAL SCORE: 5
2. NOT BEING ABLE TO STOP OR CONTROL WORRYING: SEVERAL DAYS
7. FEELING AFRAID AS IF SOMETHING AWFUL MIGHT HAPPEN: SEVERAL DAYS

## 2025-09-03 ASSESSMENT — PAIN SCALES - GENERAL: PAINLEVEL_OUTOF10: NO PAIN (0)
